# Patient Record
Sex: MALE | Race: WHITE | NOT HISPANIC OR LATINO | Employment: FULL TIME | URBAN - METROPOLITAN AREA
[De-identification: names, ages, dates, MRNs, and addresses within clinical notes are randomized per-mention and may not be internally consistent; named-entity substitution may affect disease eponyms.]

---

## 2018-01-08 ENCOUNTER — CONVERSION ENCOUNTER (OUTPATIENT)
Dept: SURGERY | Facility: CLINIC | Age: 48
End: 2018-01-08

## 2018-01-08 ENCOUNTER — OFFICE VISIT CONVERTED (OUTPATIENT)
Dept: SURGERY | Facility: CLINIC | Age: 48
End: 2018-01-08
Attending: SURGERY

## 2018-08-02 ENCOUNTER — OFFICE VISIT CONVERTED (OUTPATIENT)
Dept: ORTHOPEDIC SURGERY | Facility: CLINIC | Age: 48
End: 2018-08-02
Attending: ORTHOPAEDIC SURGERY

## 2018-08-20 ENCOUNTER — OFFICE VISIT CONVERTED (OUTPATIENT)
Dept: ORTHOPEDIC SURGERY | Facility: CLINIC | Age: 48
End: 2018-08-20
Attending: PHYSICIAN ASSISTANT

## 2018-09-10 ENCOUNTER — OFFICE VISIT CONVERTED (OUTPATIENT)
Dept: ORTHOPEDIC SURGERY | Facility: CLINIC | Age: 48
End: 2018-09-10
Attending: PHYSICIAN ASSISTANT

## 2018-10-11 ENCOUNTER — OFFICE VISIT CONVERTED (OUTPATIENT)
Dept: ORTHOPEDIC SURGERY | Facility: CLINIC | Age: 48
End: 2018-10-11
Attending: ORTHOPAEDIC SURGERY

## 2018-11-06 ENCOUNTER — OFFICE VISIT CONVERTED (OUTPATIENT)
Dept: ORTHOPEDIC SURGERY | Facility: CLINIC | Age: 48
End: 2018-11-06
Attending: PHYSICIAN ASSISTANT

## 2018-12-06 ENCOUNTER — OFFICE VISIT CONVERTED (OUTPATIENT)
Dept: ORTHOPEDIC SURGERY | Facility: CLINIC | Age: 48
End: 2018-12-06
Attending: ORTHOPAEDIC SURGERY

## 2019-01-09 ENCOUNTER — CONVERSION ENCOUNTER (OUTPATIENT)
Dept: ORTHOPEDIC SURGERY | Facility: CLINIC | Age: 49
End: 2019-01-09

## 2019-01-09 ENCOUNTER — OFFICE VISIT CONVERTED (OUTPATIENT)
Dept: ORTHOPEDIC SURGERY | Facility: CLINIC | Age: 49
End: 2019-01-09
Attending: PHYSICIAN ASSISTANT

## 2019-01-14 ENCOUNTER — HOSPITAL ENCOUNTER (OUTPATIENT)
Dept: PHYSICAL THERAPY | Facility: CLINIC | Age: 49
Setting detail: RECURRING SERIES
Discharge: HOME | End: 2019-02-11

## 2019-02-12 ENCOUNTER — OFFICE VISIT CONVERTED (OUTPATIENT)
Dept: ORTHOPEDIC SURGERY | Facility: CLINIC | Age: 49
End: 2019-02-12
Attending: PHYSICIAN ASSISTANT

## 2019-04-05 ENCOUNTER — HOSPITAL ENCOUNTER (OUTPATIENT)
Dept: MRI IMAGING | Facility: HOSPITAL | Age: 49
Discharge: HOME | End: 2019-04-05
Attending: NURSE PRACTITIONER

## 2019-04-11 ENCOUNTER — HOSPITAL ENCOUNTER (OUTPATIENT)
Dept: SLEEP MEDICINE | Facility: HOSPITAL | Age: 49
Discharge: HOME | End: 2019-04-11
Attending: PSYCHIATRY & NEUROLOGY

## 2019-04-23 ENCOUNTER — OFFICE VISIT CONVERTED (OUTPATIENT)
Dept: NEUROSURGERY | Facility: CLINIC | Age: 49
End: 2019-04-23
Attending: PHYSICIAN ASSISTANT

## 2019-04-29 ENCOUNTER — HOSPITAL ENCOUNTER (OUTPATIENT)
Dept: MRI IMAGING | Facility: HOSPITAL | Age: 49
Discharge: HOME | End: 2019-04-29
Attending: PHYSICIAN ASSISTANT

## 2019-05-08 ENCOUNTER — HOSPITAL ENCOUNTER (OUTPATIENT)
Dept: SLEEP MEDICINE | Facility: HOSPITAL | Age: 49
Discharge: HOME | End: 2019-05-08
Attending: PSYCHIATRY & NEUROLOGY

## 2019-05-16 ENCOUNTER — HOSPITAL ENCOUNTER (OUTPATIENT)
Dept: SLEEP MEDICINE | Facility: HOSPITAL | Age: 49
Discharge: HOME | End: 2019-05-16
Attending: PSYCHIATRY & NEUROLOGY

## 2019-07-23 ENCOUNTER — HOSPITAL ENCOUNTER (OUTPATIENT)
Dept: SLEEP MEDICINE | Facility: HOSPITAL | Age: 49
Discharge: HOME | End: 2019-07-23
Attending: PSYCHIATRY & NEUROLOGY

## 2019-08-28 ENCOUNTER — HOSPITAL ENCOUNTER (OUTPATIENT)
Dept: SLEEP MEDICINE | Facility: HOSPITAL | Age: 49
Discharge: HOME | End: 2019-08-28
Attending: PSYCHIATRY & NEUROLOGY

## 2021-02-03 ENCOUNTER — HOSPITAL ENCOUNTER (OUTPATIENT)
Dept: URGENT CARE | Facility: CLINIC | Age: 51
Discharge: HOME | End: 2021-02-03
Attending: EMERGENCY MEDICINE

## 2021-03-11 ENCOUNTER — HOSPITAL ENCOUNTER (OUTPATIENT)
Dept: URGENT CARE | Facility: CLINIC | Age: 51
Discharge: HOME | End: 2021-03-11
Attending: PHYSICIAN ASSISTANT

## 2021-04-08 ENCOUNTER — HOSPITAL ENCOUNTER (OUTPATIENT)
Dept: URGENT CARE | Facility: CLINIC | Age: 51
Discharge: HOME | End: 2021-04-08
Attending: PHYSICIAN ASSISTANT

## 2021-05-15 VITALS
SYSTOLIC BLOOD PRESSURE: 118 MMHG | BODY MASS INDEX: 30.02 KG/M2 | WEIGHT: 198.06 LBS | HEIGHT: 68 IN | DIASTOLIC BLOOD PRESSURE: 86 MMHG | HEART RATE: 68 BPM

## 2021-05-16 VITALS — HEIGHT: 68 IN | RESPIRATION RATE: 16 BRPM | BODY MASS INDEX: 30.31 KG/M2 | WEIGHT: 200 LBS

## 2021-05-16 VITALS — WEIGHT: 200 LBS | RESPIRATION RATE: 16 BRPM | HEIGHT: 68 IN | BODY MASS INDEX: 30.31 KG/M2

## 2021-05-16 VITALS — WEIGHT: 205 LBS | RESPIRATION RATE: 14 BRPM | BODY MASS INDEX: 31.07 KG/M2 | HEIGHT: 68 IN

## 2021-05-16 VITALS — WEIGHT: 213 LBS | BODY MASS INDEX: 32.28 KG/M2 | HEIGHT: 68 IN | RESPIRATION RATE: 16 BRPM

## 2021-05-16 VITALS — BODY MASS INDEX: 32.13 KG/M2 | WEIGHT: 212 LBS | RESPIRATION RATE: 16 BRPM | HEIGHT: 68 IN

## 2021-05-16 VITALS — HEIGHT: 68 IN | WEIGHT: 200 LBS | BODY MASS INDEX: 30.31 KG/M2 | RESPIRATION RATE: 16 BRPM

## 2021-05-16 VITALS — BODY MASS INDEX: 32.28 KG/M2 | WEIGHT: 213 LBS | RESPIRATION RATE: 12 BRPM | HEIGHT: 68 IN

## 2021-05-16 VITALS — WEIGHT: 200 LBS | BODY MASS INDEX: 30.31 KG/M2 | RESPIRATION RATE: 16 BRPM | HEIGHT: 68 IN

## 2021-05-16 VITALS — HEIGHT: 68 IN | BODY MASS INDEX: 30.31 KG/M2 | RESPIRATION RATE: 16 BRPM | WEIGHT: 200 LBS

## 2021-06-23 ENCOUNTER — TRANSCRIBE ORDERS (OUTPATIENT)
Dept: ADMINISTRATIVE | Facility: HOSPITAL | Age: 51
End: 2021-06-23

## 2021-06-23 DIAGNOSIS — R42 DIZZINESS: Primary | ICD-10-CM

## 2021-06-23 DIAGNOSIS — R06.00 DYSPNEA, UNSPECIFIED TYPE: ICD-10-CM

## 2021-09-07 ENCOUNTER — TRANSCRIBE ORDERS (OUTPATIENT)
Dept: ADMINISTRATIVE | Facility: HOSPITAL | Age: 51
End: 2021-09-07

## 2021-09-07 ENCOUNTER — HOSPITAL ENCOUNTER (OUTPATIENT)
Dept: INFUSION THERAPY | Facility: HOSPITAL | Age: 51
Discharge: HOME | End: 2021-09-07
Admitting: NURSE PRACTITIONER

## 2021-09-07 ENCOUNTER — HOSPITAL ENCOUNTER (EMERGENCY)
Facility: HOSPITAL | Age: 51
Discharge: HOME | End: 2021-09-07
Attending: EMERGENCY MEDICINE | Admitting: EMERGENCY MEDICINE

## 2021-09-07 ENCOUNTER — APPOINTMENT (OUTPATIENT)
Dept: CT IMAGING | Facility: HOSPITAL | Age: 51
End: 2021-09-07

## 2021-09-07 ENCOUNTER — APPOINTMENT (OUTPATIENT)
Dept: GENERAL RADIOLOGY | Facility: HOSPITAL | Age: 51
End: 2021-09-07

## 2021-09-07 VITALS
SYSTOLIC BLOOD PRESSURE: 111 MMHG | HEART RATE: 88 BPM | DIASTOLIC BLOOD PRESSURE: 41 MMHG | OXYGEN SATURATION: 90 % | TEMPERATURE: 102.6 F

## 2021-09-07 VITALS
RESPIRATION RATE: 20 BRPM | BODY MASS INDEX: 30.31 KG/M2 | WEIGHT: 199.96 LBS | OXYGEN SATURATION: 92 % | DIASTOLIC BLOOD PRESSURE: 75 MMHG | HEART RATE: 86 BPM | TEMPERATURE: 99 F | HEIGHT: 68 IN | SYSTOLIC BLOOD PRESSURE: 133 MMHG

## 2021-09-07 DIAGNOSIS — J12.82 PNEUMONIA DUE TO COVID-19 VIRUS: ICD-10-CM

## 2021-09-07 DIAGNOSIS — U07.1 CLINICAL DIAGNOSIS OF COVID-19: Primary | ICD-10-CM

## 2021-09-07 DIAGNOSIS — U07.1 COVID-19: Primary | ICD-10-CM

## 2021-09-07 DIAGNOSIS — U07.1 CLINICAL DIAGNOSIS OF SEVERE ACUTE RESPIRATORY SYNDROME CORONAVIRUS 2 (SARS-COV-2) DISEASE: Primary | ICD-10-CM

## 2021-09-07 DIAGNOSIS — U07.1 PNEUMONIA DUE TO COVID-19 VIRUS: ICD-10-CM

## 2021-09-07 LAB
ALBUMIN SERPL-MCNC: 4.3 G/DL (ref 3.5–5.2)
ALBUMIN/GLOB SERPL: 1.3 G/DL
ALP SERPL-CCNC: 65 U/L (ref 39–117)
ALT SERPL W P-5'-P-CCNC: 51 U/L (ref 1–41)
ANION GAP SERPL CALCULATED.3IONS-SCNC: 11.3 MMOL/L (ref 5–15)
AST SERPL-CCNC: 58 U/L (ref 1–40)
BASOPHILS # BLD AUTO: 0.01 10*3/MM3 (ref 0–0.2)
BASOPHILS NFR BLD AUTO: 0.3 % (ref 0–1.5)
BILIRUB SERPL-MCNC: 0.3 MG/DL (ref 0–1.2)
BUN SERPL-MCNC: 16 MG/DL (ref 6–20)
BUN/CREAT SERPL: 15.5 (ref 7–25)
CALCIUM SPEC-SCNC: 9 MG/DL (ref 8.6–10.5)
CHLORIDE SERPL-SCNC: 102 MMOL/L (ref 98–107)
CO2 SERPL-SCNC: 24.7 MMOL/L (ref 22–29)
CREAT SERPL-MCNC: 1.03 MG/DL (ref 0.76–1.27)
D DIMER PPP FEU-MCNC: 0.71 MG/L (FEU) (ref 0–0.59)
DEPRECATED RDW RBC AUTO: 43.2 FL (ref 37–54)
EOSINOPHIL # BLD AUTO: 0 10*3/MM3 (ref 0–0.4)
EOSINOPHIL NFR BLD AUTO: 0 % (ref 0.3–6.2)
ERYTHROCYTE [DISTWIDTH] IN BLOOD BY AUTOMATED COUNT: 12.7 % (ref 12.3–15.4)
GFR SERPL CREATININE-BSD FRML MDRD: 76 ML/MIN/1.73
GLOBULIN UR ELPH-MCNC: 3.3 GM/DL
GLUCOSE SERPL-MCNC: 102 MG/DL (ref 65–99)
HCT VFR BLD AUTO: 43.8 % (ref 37.5–51)
HGB BLD-MCNC: 14.8 G/DL (ref 13–17.7)
HOLD SPECIMEN: NORMAL
HOLD SPECIMEN: NORMAL
IMM GRANULOCYTES # BLD AUTO: 0.02 10*3/MM3 (ref 0–0.05)
IMM GRANULOCYTES NFR BLD AUTO: 0.6 % (ref 0–0.5)
LYMPHOCYTES # BLD AUTO: 1.45 10*3/MM3 (ref 0.7–3.1)
LYMPHOCYTES NFR BLD AUTO: 44.8 % (ref 19.6–45.3)
MCH RBC QN AUTO: 30.9 PG (ref 26.6–33)
MCHC RBC AUTO-ENTMCNC: 33.8 G/DL (ref 31.5–35.7)
MCV RBC AUTO: 91.4 FL (ref 79–97)
MONOCYTES # BLD AUTO: 0.12 10*3/MM3 (ref 0.1–0.9)
MONOCYTES NFR BLD AUTO: 3.7 % (ref 5–12)
NEUTROPHILS NFR BLD AUTO: 1.64 10*3/MM3 (ref 1.7–7)
NEUTROPHILS NFR BLD AUTO: 50.6 % (ref 42.7–76)
NRBC BLD AUTO-RTO: 0 /100 WBC (ref 0–0.2)
NT-PROBNP SERPL-MCNC: 183.3 PG/ML (ref 0–900)
PLATELET # BLD AUTO: 162 10*3/MM3 (ref 140–450)
PMV BLD AUTO: 9.3 FL (ref 6–12)
POTASSIUM SERPL-SCNC: 4.1 MMOL/L (ref 3.5–5.2)
PROT SERPL-MCNC: 7.6 G/DL (ref 6–8.5)
QT INTERVAL: 382 MS
RBC # BLD AUTO: 4.79 10*6/MM3 (ref 4.14–5.8)
SODIUM SERPL-SCNC: 138 MMOL/L (ref 136–145)
TROPONIN T SERPL-MCNC: <0.01 NG/ML (ref 0–0.03)
WBC # BLD AUTO: 3.24 10*3/MM3 (ref 3.4–10.8)
WHOLE BLOOD HOLD SPECIMEN: NORMAL
WHOLE BLOOD HOLD SPECIMEN: NORMAL

## 2021-09-07 PROCEDURE — 93005 ELECTROCARDIOGRAM TRACING: CPT | Performed by: EMERGENCY MEDICINE

## 2021-09-07 PROCEDURE — 85379 FIBRIN DEGRADATION QUANT: CPT | Performed by: NURSE PRACTITIONER

## 2021-09-07 PROCEDURE — 84484 ASSAY OF TROPONIN QUANT: CPT | Performed by: EMERGENCY MEDICINE

## 2021-09-07 PROCEDURE — 0 IOPAMIDOL PER 1 ML: Performed by: EMERGENCY MEDICINE

## 2021-09-07 PROCEDURE — 80053 COMPREHEN METABOLIC PANEL: CPT | Performed by: EMERGENCY MEDICINE

## 2021-09-07 PROCEDURE — 93010 ELECTROCARDIOGRAM REPORT: CPT | Performed by: INTERNAL MEDICINE

## 2021-09-07 PROCEDURE — M0243 CASIRIVI AND IMDEVI INFUSION: HCPCS | Performed by: NURSE PRACTITIONER

## 2021-09-07 PROCEDURE — 71275 CT ANGIOGRAPHY CHEST: CPT

## 2021-09-07 PROCEDURE — 83880 ASSAY OF NATRIURETIC PEPTIDE: CPT | Performed by: EMERGENCY MEDICINE

## 2021-09-07 PROCEDURE — 71045 X-RAY EXAM CHEST 1 VIEW: CPT

## 2021-09-07 PROCEDURE — 99284 EMERGENCY DEPT VISIT MOD MDM: CPT

## 2021-09-07 PROCEDURE — 85025 COMPLETE CBC W/AUTO DIFF WBC: CPT | Performed by: EMERGENCY MEDICINE

## 2021-09-07 PROCEDURE — 25010000006 INJECTION, CASIRIVIMAB AND IMDEVIMAB, 1200 MG: Performed by: NURSE PRACTITIONER

## 2021-09-07 RX ORDER — DIPHENHYDRAMINE HYDROCHLORIDE 50 MG/ML
50 INJECTION INTRAMUSCULAR; INTRAVENOUS AS NEEDED
Status: DISCONTINUED | OUTPATIENT
Start: 2021-09-07 | End: 2021-09-09 | Stop reason: HOSPADM

## 2021-09-07 RX ORDER — EPINEPHRINE 1 MG/ML
0.3 INJECTION, SOLUTION, CONCENTRATE INTRAVENOUS AS NEEDED
Status: DISCONTINUED | OUTPATIENT
Start: 2021-09-07 | End: 2021-09-09 | Stop reason: HOSPADM

## 2021-09-07 RX ORDER — SODIUM CHLORIDE 0.9 % (FLUSH) 0.9 %
10 SYRINGE (ML) INJECTION AS NEEDED
Status: DISCONTINUED | OUTPATIENT
Start: 2021-09-07 | End: 2021-09-07 | Stop reason: HOSPADM

## 2021-09-07 RX ORDER — AZITHROMYCIN 250 MG/1
TABLET, FILM COATED ORAL
Qty: 6 TABLET | Refills: 0 | Status: SHIPPED | OUTPATIENT
Start: 2021-09-07 | End: 2021-09-12 | Stop reason: HOSPADM

## 2021-09-07 RX ORDER — ACETAMINOPHEN 500 MG
500 TABLET ORAL ONCE AS NEEDED
Status: COMPLETED | OUTPATIENT
Start: 2021-09-07 | End: 2021-09-07

## 2021-09-07 RX ORDER — ESOMEPRAZOLE MAGNESIUM 10 MG/1
10 GRANULE, FOR SUSPENSION, EXTENDED RELEASE ORAL DAILY
COMMUNITY
End: 2022-02-08

## 2021-09-07 RX ORDER — BROMPHENIRAMINE MALEATE, PSEUDOEPHEDRINE HYDROCHLORIDE, AND DEXTROMETHORPHAN HYDROBROMIDE 2; 30; 10 MG/5ML; MG/5ML; MG/5ML
5 SYRUP ORAL
COMMUNITY
Start: 2021-08-31 | End: 2022-02-08

## 2021-09-07 RX ORDER — PREDNISONE 20 MG/1
60 TABLET ORAL DAILY
Qty: 15 TABLET | Refills: 0 | Status: SHIPPED | OUTPATIENT
Start: 2021-09-07 | End: 2021-09-12 | Stop reason: HOSPADM

## 2021-09-07 RX ADMIN — IOPAMIDOL 100 ML: 755 INJECTION, SOLUTION INTRAVENOUS at 09:34

## 2021-09-07 RX ADMIN — ACETAMINOPHEN 500 MG: 500 TABLET, FILM COATED ORAL at 14:45

## 2021-09-07 RX ADMIN — CASIRIVIMAB AND IMDEVIMAB 300 MG: 600; 600 INJECTION, SOLUTION, CONCENTRATE INTRAVENOUS at 14:14

## 2021-09-07 NOTE — DISCHARGE INSTRUCTIONS
Please return at 2 PM Eastern time to receive your antibody infusion, you will park in the blue parking lot, and call [phone] let them know you had arrived.  Return to the emergency department for new or worsening symptoms  Rest  Please follow-up with your primary care provider, call for an appointment

## 2021-09-07 NOTE — ED PROVIDER NOTES
Subjective    Chief Complaint   Patient presents with   • Exposure To Known Illness   • Shortness of Breath     Rocio Fernandez, APRN  No LMP for male patient.  No Known Allergies    History of Present Illness  Patient is a 50-year-old male presents emergency department complaint of shortness of breath, dizziness.  Patient reports he was is coming to visit his wife, who is here for similar symptoms, and became more short of breath, with some associated chest discomfort.  Patient reports he did test positive for COVID-19 on 8/31/2021 when at his primary care provider's office.  He did not receive a Regeneron infusion.    Review of Systems   Constitutional: Positive for chills and fever.   Respiratory: Positive for cough and shortness of breath. Negative for chest tightness.    Cardiovascular: Negative for chest pain, palpitations and leg swelling.   Gastrointestinal: Negative for abdominal pain, diarrhea, nausea and vomiting.   Musculoskeletal: Negative for back pain and neck pain.   Skin: Negative for color change and rash.   Neurological: Positive for dizziness and light-headedness. Negative for seizures, syncope, speech difficulty, weakness and numbness.       Past Medical History:   Diagnosis Date   • Kidney stone        No Known Allergies    Past Surgical History:   Procedure Laterality Date   • ELBOW ARTHROPLASTY      right   • ROTATOR CUFF REPAIR      right       History reviewed. No pertinent family history.    Social History     Socioeconomic History   • Marital status:      Spouse name: Not on file   • Number of children: Not on file   • Years of education: Not on file   • Highest education level: Not on file   Vaping Use   • Vaping Use: Never used   Substance and Sexual Activity   • Alcohol use: Not Currently   • Drug use: Never           Objective   Physical Exam  Vitals and nursing note reviewed.   Constitutional:       General: He is not in acute distress.     Appearance: He is well-developed.  "He is not ill-appearing, toxic-appearing or diaphoretic.   HENT:      Head: Normocephalic and atraumatic.      Mouth/Throat:      Mouth: Mucous membranes are moist.      Pharynx: Oropharynx is clear.   Eyes:      Extraocular Movements: Extraocular movements intact.      Pupils: Pupils are equal, round, and reactive to light.   Cardiovascular:      Rate and Rhythm: Normal rate and regular rhythm.      Heart sounds: No murmur heard.   No friction rub. No gallop.    Pulmonary:      Effort: Pulmonary effort is normal.      Breath sounds: Normal breath sounds.   Musculoskeletal:      Cervical back: Normal range of motion and neck supple.      Right lower leg: No tenderness. No edema.      Left lower leg: No tenderness. No edema.   Skin:     General: Skin is warm and dry.      Capillary Refill: Capillary refill takes less than 2 seconds.   Neurological:      General: No focal deficit present.      Mental Status: He is alert and oriented to person, place, and time.   Psychiatric:         Mood and Affect: Mood normal.         Behavior: Behavior normal.         Procedures           ED Course  ED Course as of Sep 07 1513   Tue Sep 07, 2021   1103 Patient reading [employer] patient information sheet.       [LB]   1124 Patient wishes to proceed with regeneron infusion.       [LB]   1139 Spoke with Gely Rodrigues regarding regeneron infusion.     [LB]   1155 Lilia pharmacist, arranged regeneron infusion for 2pm, orders placed per her.     [LB]      ED Course User Index  [LB] Ravi Yadira M, APRN      /75   Pulse 86   Temp 99 °F (37.2 °C) (Oral)   Resp 20   Ht 172.7 cm (68\")   Wt 90.7 kg (199 lb 15.3 oz)   SpO2 92%   BMI 30.40 kg/m²   Labs Reviewed   COMPREHENSIVE METABOLIC PANEL - Abnormal; Notable for the following components:       Result Value    Glucose 102 (*)     ALT (SGPT) 51 (*)     AST (SGOT) 58 (*)     All other components within normal limits    Narrative:     GFR Normal >60  Chronic Kidney Disease " <60  Kidney Failure <15     CBC WITH AUTO DIFFERENTIAL - Abnormal; Notable for the following components:    WBC 3.24 (*)     Monocyte % 3.7 (*)     Eosinophil % 0.0 (*)     Immature Grans % 0.6 (*)     Neutrophils, Absolute 1.64 (*)     All other components within normal limits   D-DIMER, QUANTITATIVE - Abnormal; Notable for the following components:    D-Dimer, Quantitative 0.71 (*)     All other components within normal limits    Narrative:     Effective 6/30/09 new normal reference range: <= 0.59 mg/L FEU  Increases in D-dimer concentration observed with  thromboembolic events can be variable due to localization,  size and age of the thrombus. Therefore, a thromboembolic  event cannot be diagnosed with certainty on the basis of the  reference range.  D-dimers may also be elevated for a variety of disorders   including: advanced age, pregnancy, coronary disease, cancer,  liver disease, infection, inflammation, hematoma, DIC, trauma,  post surgery, diabetes, thrombolytic therapy, stress, and  general hospitalization. D-dimer levels may be decreased in  patients on anticoagulant therapy.   BNP (IN-HOUSE) - Normal    Narrative:     Among patients with dyspnea, NT-proBNP is highly sensitive for the detection of acute congestive heart failure. In addition NT-proBNP of <300 pg/ml effectively rules out acute congestive heart failure with 99% negative predictive value.    Results may be falsely decreased if patient taking Biotin.     TROPONIN (IN-HOUSE) - Normal    Narrative:     Troponin T Reference Range:  <= 0.03 ng/mL-   Negative for AMI  >0.03 ng/mL-     Abnormal for myocardial necrosis.  Clinicians would have to utilize clinical acumen, EKG, Troponin and serial changes to determine if it is an Acute Myocardial Infarction or myocardial injury due to an underlying chronic condition.       Results may be falsely decreased if patient taking Biotin.     RAINBOW DRAW    Narrative:     The following orders were created for  panel order [place] Draw.  Procedure                               Abnormality         Status                     ---------                               -----------         ------                     Green Top (Gel)[[id number]]                                  Final result               Lavender Top[[id number]]                                     Final result               Gold Top - SST[[id number]]                                   Final result               Light Blue Top[[id number]]                                   Final result                 Please view results for these tests on the individual orders.   CBC AND DIFFERENTIAL    Narrative:     The following orders were created for panel order CBC & Differential.  Procedure                               Abnormality         Status                     ---------                               -----------         ------                     CBC Auto Differential[[id number]]        Abnormal            Final result                 Please view results for these tests on the individual orders.   GREEN TOP   LAVENDER TOP   GOLD TOP - SST   LIGHT BLUE TOP     Medications   iopamidol (ISOVUE-370) 76 % injection 100 mL (100 mL Intravenous Given 9/7/21 0934)     XR Chest 1 View    Result Date: 9/7/2021  Narrative: PROCEDURE: XR CHEST 1 VW  COMPARISON: None  INDICATIONS: cough, shortness of breath, covid  FINDINGS:  The heart is normal in size.  The lungs are well-expanded.  Diffuse patchy airspace disease in the lungs bilaterally is consistent with multifocal pneumonia.  The differential diagnosis includes COVID-19 pneumonia.  Bony structures appear intact.  CONCLUSION:  Bilateral airspace disease consistent with multifocal pneumonia.       KYM MANJARREZ MD       Electronically Signed and Approved By: KYM MANJARREZ MD on 9/07/2021 at 8:08             CT Chest Pulmonary Embolism    Result Date: 9/7/2021  Narrative: PROCEDURE: CT CHEST PULMONARY EMBOLISM W CONTRAST  COMPARISON:   [facility], CT, ABD PEL W/O CONTRAST, 8/16/2014, 13:34. INDICATIONS: PE suspected, low/intermediate prob, positive D-dimer/covid + 1 WEEK AGO  TECHNIQUE: After obtaining the patient's consent, CT images were obtained with non-ionic intravenous contrast material.   PROTOCOL:   Pulmonary embolism imaging protocol performed    RADIATION:   DLP: 371.3mGy*cm   Automated exposure control was utilized to minimize radiation dose. CONTRAST: 100cc Isovue 370 I.V.  FINDINGS:  No evidence for pulmonary embolus.  No evidence for acute aortic injury.  No adenopathy.  No acute findings in the included upper abdomen.  Small hiatal hernia.  Trace pleural effusions.  Patchy bilateral ground-glass opacity, predominately peripheral.  No aggressive appearing bone change.  CONCLUSION: No evidence for pulmonary embolus.  Bilateral pulmonary opacities, consistent with provided history of COVID-19.     JEFF HERNDON MD       Electronically Signed and Approved By: JEFF HERNDON MD on 9/07/2021 at 9:56                  The FDA has authorized the emergency use of casirivimab and imdevimab, which is not an FDA approved drug. Discussions with the patient regarding the risks and benefits of casirivimab and imdevimab have occurred. The patient recognizes that this is an investigational treatment which may offer significant known and potential benefits and risks, the extent of which are unknown. Information on available alternative treatments and the risks and benefits of those alternatives was discussed. The patient received the “Fact Sheet for Patients Parents and Caregivers”. All questions from the patient were answered to satisfaction. The patient has the option to accept or refuse treatment with casirivimab and imdevimab and would like to accept treatment.    Counseling regarding continued self isolation and use of infection control measures according to the CDC guidelines has occurred.                                MDM  Number of  Diagnoses or Management Options     Amount and/or Complexity of Data Reviewed  Clinical lab tests: reviewed  Tests in the radiology section of CPT®: reviewed  Tests in the medicine section of CPT®: reviewed  Review and summarize past medical records: yes (Reviewed patient's care everywhere, positive COVID-19 test on 8/31/2021.)    Appropriate PPE was worn during the duration of the care for this patient while in the emergency department per [facility] Policy    ----Differentials: Pneumonia, PE, CHF  This list is not all inclusive and does not constitute the entireity of considered causes.     ----Lab and imaging reviewd by me, imaging interpreted per radiologist and independly viewed by me:     As above    ED  Course----Patient was brought back to the emergency department room for evaluation and placed on appropriate monitoring.      Record Review: Patient had positive COVID-19 test with his primary care provider.    Vital signs have  been reviewed. Patient is afebrile. Non toxic in appearance. Alert and oriented to person, place, time and situation.  Patient has no PE on his CT protocol.  Patient maintains O2 saturations at least 92 to 93, is not requiring oxygen.  Patient is a candidate for Regeneron.  He was agreeable.  This was scheduled via protocol for this facility, schedule by the pharmacist.    I spoke with the patient at the bedside regarding their plan of care, discharge instruction, home care, prescriptions, and importance follow-up.  We discussed test results at the bedside, including incidental abnormal labs, radiological findings, understands need for follow-up with primary care or specialist if indicated.      Patient was made aware of indications to return to the emergency department.  Patient agrees with the current plan of care for discharge, verbalized understanding of all instructions    Pt is aware that discharge does not mean that nothing is wrong but it indicates no emergency is present  and they must continue care with follow-up as given below or physician of their choice                            Final diagnoses:   Clinical diagnosis of COVID-19   Pneumonia due to COVID-19 virus       ED Disposition  ED Disposition     ED Disposition Condition Comment    Discharge Stable           Rocio Fernandez, APRN  [address]  [facility]  [address]  [facility] [address]  [phone]    Schedule an appointment as soon as possible for a visit       [facility] EMERGENCY ROOM  [address] [place] [id number] [phone]    As needed, If symptoms worsen         Medication List      New Prescriptions    azithromycin 250 MG tablet  Commonly known as: Zithromax Z-Ronny  Take 2 tablets by mouth on day 1, then 1 tablet daily on days 2-5     predniSONE 20 MG tablet  Commonly known as: DELTASONE  Take 3 tablets by mouth Daily for 5 days.           Where to Get Your Medications      These medications were [facility] - [address] [facility] [address]    Hours: Mon-Fri 9:00AM-7:30PM Saturday 9:00AM-2:00PM Phone: [phone]   · azithromycin 250 MG tablet  · predniSONE 20 MG tablet          Yadira Rodrigues, APRN  09/07/21 1398

## 2021-09-08 ENCOUNTER — HOSPITAL ENCOUNTER (INPATIENT)
Facility: HOSPITAL | Age: 51
LOS: 4 days | Discharge: HOME | End: 2021-09-12
Attending: EMERGENCY MEDICINE | Admitting: HOSPITALIST

## 2021-09-08 ENCOUNTER — APPOINTMENT (OUTPATIENT)
Dept: GENERAL RADIOLOGY | Facility: HOSPITAL | Age: 51
End: 2021-09-08

## 2021-09-08 DIAGNOSIS — R06.09 DYSPNEA ON EXERTION: Primary | ICD-10-CM

## 2021-09-08 DIAGNOSIS — U07.1 COVID-19: ICD-10-CM

## 2021-09-08 LAB
ALBUMIN SERPL-MCNC: 3.9 G/DL (ref 3.5–5.2)
ALBUMIN/GLOB SERPL: 1.1 G/DL
ALP SERPL-CCNC: 55 U/L (ref 39–117)
ALT SERPL W P-5'-P-CCNC: 48 U/L (ref 1–41)
ANION GAP SERPL CALCULATED.3IONS-SCNC: 11.9 MMOL/L (ref 5–15)
AST SERPL-CCNC: 58 U/L (ref 1–40)
BASOPHILS # BLD AUTO: 0 10*3/MM3 (ref 0–0.2)
BASOPHILS NFR BLD AUTO: 0 % (ref 0–1.5)
BILIRUB SERPL-MCNC: 0.3 MG/DL (ref 0–1.2)
BUN SERPL-MCNC: 17 MG/DL (ref 6–20)
BUN/CREAT SERPL: 14.3 (ref 7–25)
CALCIUM SPEC-SCNC: 9 MG/DL (ref 8.6–10.5)
CHLORIDE SERPL-SCNC: 100 MMOL/L (ref 98–107)
CO2 SERPL-SCNC: 22.1 MMOL/L (ref 22–29)
CREAT SERPL-MCNC: 1.19 MG/DL (ref 0.76–1.27)
DEPRECATED RDW RBC AUTO: 42.5 FL (ref 37–54)
EOSINOPHIL # BLD AUTO: 0 10*3/MM3 (ref 0–0.4)
EOSINOPHIL NFR BLD AUTO: 0 % (ref 0.3–6.2)
ERYTHROCYTE [DISTWIDTH] IN BLOOD BY AUTOMATED COUNT: 12.7 % (ref 12.3–15.4)
GFR SERPL CREATININE-BSD FRML MDRD: 65 ML/MIN/1.73
GLOBULIN UR ELPH-MCNC: 3.4 GM/DL
GLUCOSE SERPL-MCNC: 182 MG/DL (ref 65–99)
HCT VFR BLD AUTO: 40.6 % (ref 37.5–51)
HGB BLD-MCNC: 14.1 G/DL (ref 13–17.7)
HOLD SPECIMEN: NORMAL
HOLD SPECIMEN: NORMAL
IMM GRANULOCYTES # BLD AUTO: 0.02 10*3/MM3 (ref 0–0.05)
IMM GRANULOCYTES NFR BLD AUTO: 0.7 % (ref 0–0.5)
LYMPHOCYTES # BLD AUTO: 0.59 10*3/MM3 (ref 0.7–3.1)
LYMPHOCYTES NFR BLD AUTO: 19.4 % (ref 19.6–45.3)
MCH RBC QN AUTO: 31.3 PG (ref 26.6–33)
MCHC RBC AUTO-ENTMCNC: 34.7 G/DL (ref 31.5–35.7)
MCV RBC AUTO: 90 FL (ref 79–97)
MONOCYTES # BLD AUTO: 0.09 10*3/MM3 (ref 0.1–0.9)
MONOCYTES NFR BLD AUTO: 3 % (ref 5–12)
NEUTROPHILS NFR BLD AUTO: 2.34 10*3/MM3 (ref 1.7–7)
NEUTROPHILS NFR BLD AUTO: 76.9 % (ref 42.7–76)
NRBC BLD AUTO-RTO: 0 /100 WBC (ref 0–0.2)
NT-PROBNP SERPL-MCNC: 128 PG/ML (ref 0–900)
PLATELET # BLD AUTO: 199 10*3/MM3 (ref 140–450)
PMV BLD AUTO: 9.7 FL (ref 6–12)
POTASSIUM SERPL-SCNC: 3.9 MMOL/L (ref 3.5–5.2)
PROT SERPL-MCNC: 7.3 G/DL (ref 6–8.5)
RBC # BLD AUTO: 4.51 10*6/MM3 (ref 4.14–5.8)
SODIUM SERPL-SCNC: 134 MMOL/L (ref 136–145)
TROPONIN T SERPL-MCNC: <0.01 NG/ML (ref 0–0.03)
WBC # BLD AUTO: 3.04 10*3/MM3 (ref 3.4–10.8)
WHOLE BLOOD HOLD SPECIMEN: NORMAL
WHOLE BLOOD HOLD SPECIMEN: NORMAL

## 2021-09-08 PROCEDURE — 99284 EMERGENCY DEPT VISIT MOD MDM: CPT

## 2021-09-08 PROCEDURE — 85025 COMPLETE CBC W/AUTO DIFF WBC: CPT

## 2021-09-08 PROCEDURE — 93005 ELECTROCARDIOGRAM TRACING: CPT | Performed by: EMERGENCY MEDICINE

## 2021-09-08 PROCEDURE — 71045 X-RAY EXAM CHEST 1 VIEW: CPT

## 2021-09-08 PROCEDURE — 93010 ELECTROCARDIOGRAM REPORT: CPT | Performed by: INTERNAL MEDICINE

## 2021-09-08 PROCEDURE — G0378 HOSPITAL OBSERVATION PER HR: HCPCS

## 2021-09-08 PROCEDURE — 93005 ELECTROCARDIOGRAM TRACING: CPT

## 2021-09-08 PROCEDURE — 83880 ASSAY OF NATRIURETIC PEPTIDE: CPT

## 2021-09-08 PROCEDURE — 99221 1ST HOSP IP/OBS SF/LOW 40: CPT | Performed by: PHYSICIAN ASSISTANT

## 2021-09-08 PROCEDURE — 84484 ASSAY OF TROPONIN QUANT: CPT

## 2021-09-08 PROCEDURE — 80053 COMPREHEN METABOLIC PANEL: CPT

## 2021-09-08 RX ORDER — ALBUTEROL SULFATE 2.5 MG/3ML
2.5 SOLUTION RESPIRATORY (INHALATION) ONCE AS NEEDED
Status: DISCONTINUED | OUTPATIENT
Start: 2021-09-08 | End: 2021-09-12 | Stop reason: HOSPADM

## 2021-09-08 RX ORDER — SODIUM CHLORIDE 0.9 % (FLUSH) 0.9 %
10 SYRINGE (ML) INJECTION AS NEEDED
Status: DISCONTINUED | OUTPATIENT
Start: 2021-09-08 | End: 2021-09-12 | Stop reason: HOSPADM

## 2021-09-08 RX ORDER — SODIUM CHLORIDE FOR INHALATION 3 %
4 VIAL, NEBULIZER (ML) INHALATION ONCE AS NEEDED
Status: DISCONTINUED | OUTPATIENT
Start: 2021-09-08 | End: 2021-09-12 | Stop reason: HOSPADM

## 2021-09-08 RX ORDER — IPRATROPIUM BROMIDE AND ALBUTEROL SULFATE 2.5; .5 MG/3ML; MG/3ML
3 SOLUTION RESPIRATORY (INHALATION) EVERY 4 HOURS PRN
Status: DISCONTINUED | OUTPATIENT
Start: 2021-09-08 | End: 2021-09-10

## 2021-09-08 RX ORDER — DEXAMETHASONE SODIUM PHOSPHATE 10 MG/ML
6 INJECTION INTRAMUSCULAR; INTRAVENOUS DAILY
Status: DISCONTINUED | OUTPATIENT
Start: 2021-09-09 | End: 2021-09-12 | Stop reason: HOSPADM

## 2021-09-09 LAB
ALBUMIN SERPL-MCNC: 3.6 G/DL (ref 3.5–5.2)
ALBUMIN/GLOB SERPL: 1.1 G/DL
ALP SERPL-CCNC: 55 U/L (ref 39–117)
ALT SERPL W P-5'-P-CCNC: 43 U/L (ref 1–41)
ANION GAP SERPL CALCULATED.3IONS-SCNC: 12.8 MMOL/L (ref 5–15)
AST SERPL-CCNC: 58 U/L (ref 1–40)
BASOPHILS # BLD AUTO: 0.01 10*3/MM3 (ref 0–0.2)
BASOPHILS NFR BLD AUTO: 0.1 % (ref 0–1.5)
BILIRUB SERPL-MCNC: 0.3 MG/DL (ref 0–1.2)
BUN SERPL-MCNC: 14 MG/DL (ref 6–20)
BUN/CREAT SERPL: 13.5 (ref 7–25)
CALCIUM SPEC-SCNC: 9.1 MG/DL (ref 8.6–10.5)
CHLORIDE SERPL-SCNC: 102 MMOL/L (ref 98–107)
CK SERPL-CCNC: 175 U/L (ref 20–200)
CO2 SERPL-SCNC: 22.2 MMOL/L (ref 22–29)
CREAT SERPL-MCNC: 1.04 MG/DL (ref 0.76–1.27)
CRP SERPL-MCNC: 9.69 MG/DL (ref 0–0.5)
D DIMER PPP FEU-MCNC: 0.66 MG/L (FEU) (ref 0–0.59)
DEPRECATED RDW RBC AUTO: 42.3 FL (ref 37–54)
EOSINOPHIL # BLD AUTO: 0 10*3/MM3 (ref 0–0.4)
EOSINOPHIL NFR BLD AUTO: 0 % (ref 0.3–6.2)
ERYTHROCYTE [DISTWIDTH] IN BLOOD BY AUTOMATED COUNT: 12.8 % (ref 12.3–15.4)
FERRITIN SERPL-MCNC: 1257 NG/ML (ref 30–400)
FIBRINOGEN PPP-MCNC: 519 MG/DL (ref 200–450)
GFR SERPL CREATININE-BSD FRML MDRD: 76 ML/MIN/1.73
GLOBULIN UR ELPH-MCNC: 3.4 GM/DL
GLUCOSE BLDC GLUCOMTR-MCNC: 154 MG/DL (ref 70–99)
GLUCOSE SERPL-MCNC: 123 MG/DL (ref 65–99)
HCT VFR BLD AUTO: 38.4 % (ref 37.5–51)
HGB BLD-MCNC: 13.3 G/DL (ref 13–17.7)
IMM GRANULOCYTES # BLD AUTO: 0.03 10*3/MM3 (ref 0–0.05)
IMM GRANULOCYTES NFR BLD AUTO: 0.4 % (ref 0–0.5)
L PNEUMO1 AG UR QL IA: NEGATIVE
LDH SERPL-CCNC: 513 U/L (ref 135–225)
LYMPHOCYTES # BLD AUTO: 1.34 10*3/MM3 (ref 0.7–3.1)
LYMPHOCYTES NFR BLD AUTO: 16.9 % (ref 19.6–45.3)
MAGNESIUM SERPL-MCNC: 1.8 MG/DL (ref 1.6–2.6)
MCH RBC QN AUTO: 31.1 PG (ref 26.6–33)
MCHC RBC AUTO-ENTMCNC: 34.6 G/DL (ref 31.5–35.7)
MCV RBC AUTO: 89.7 FL (ref 79–97)
MONOCYTES # BLD AUTO: 0.5 10*3/MM3 (ref 0.1–0.9)
MONOCYTES NFR BLD AUTO: 6.3 % (ref 5–12)
NEUTROPHILS NFR BLD AUTO: 6.04 10*3/MM3 (ref 1.7–7)
NEUTROPHILS NFR BLD AUTO: 76.3 % (ref 42.7–76)
NRBC BLD AUTO-RTO: 0 /100 WBC (ref 0–0.2)
PLATELET # BLD AUTO: 219 10*3/MM3 (ref 140–450)
PMV BLD AUTO: 10 FL (ref 6–12)
POTASSIUM SERPL-SCNC: 3.8 MMOL/L (ref 3.5–5.2)
PROCALCITONIN SERPL-MCNC: 0.12 NG/ML (ref 0–0.25)
PROT SERPL-MCNC: 7 G/DL (ref 6–8.5)
QT INTERVAL: 407 MS
RBC # BLD AUTO: 4.28 10*6/MM3 (ref 4.14–5.8)
S PNEUM AG SPEC QL LA: NEGATIVE
SODIUM SERPL-SCNC: 137 MMOL/L (ref 136–145)
WBC # BLD AUTO: 7.92 10*3/MM3 (ref 3.4–10.8)

## 2021-09-09 PROCEDURE — 86140 C-REACTIVE PROTEIN: CPT | Performed by: PHYSICIAN ASSISTANT

## 2021-09-09 PROCEDURE — 87899 AGENT NOS ASSAY W/OPTIC: CPT | Performed by: PHYSICIAN ASSISTANT

## 2021-09-09 PROCEDURE — 83615 LACTATE (LD) (LDH) ENZYME: CPT | Performed by: PHYSICIAN ASSISTANT

## 2021-09-09 PROCEDURE — 82550 ASSAY OF CK (CPK): CPT | Performed by: PHYSICIAN ASSISTANT

## 2021-09-09 PROCEDURE — G0378 HOSPITAL OBSERVATION PER HR: HCPCS

## 2021-09-09 PROCEDURE — 85025 COMPLETE CBC W/AUTO DIFF WBC: CPT | Performed by: PHYSICIAN ASSISTANT

## 2021-09-09 PROCEDURE — 82962 GLUCOSE BLOOD TEST: CPT

## 2021-09-09 PROCEDURE — 83735 ASSAY OF MAGNESIUM: CPT | Performed by: PHYSICIAN ASSISTANT

## 2021-09-09 PROCEDURE — 85384 FIBRINOGEN ACTIVITY: CPT | Performed by: PHYSICIAN ASSISTANT

## 2021-09-09 PROCEDURE — 94660 CPAP INITIATION&MGMT: CPT

## 2021-09-09 PROCEDURE — 80053 COMPREHEN METABOLIC PANEL: CPT | Performed by: PHYSICIAN ASSISTANT

## 2021-09-09 PROCEDURE — 85379 FIBRIN DEGRADATION QUANT: CPT | Performed by: PHYSICIAN ASSISTANT

## 2021-09-09 PROCEDURE — 94640 AIRWAY INHALATION TREATMENT: CPT

## 2021-09-09 PROCEDURE — 63710000001 DEXAMETHASONE PER 0.25 MG: Performed by: PHYSICIAN ASSISTANT

## 2021-09-09 PROCEDURE — 99233 SBSQ HOSP IP/OBS HIGH 50: CPT | Performed by: INTERNAL MEDICINE

## 2021-09-09 PROCEDURE — 84145 PROCALCITONIN (PCT): CPT | Performed by: PHYSICIAN ASSISTANT

## 2021-09-09 PROCEDURE — 25010000002 ENOXAPARIN PER 10 MG: Performed by: PHYSICIAN ASSISTANT

## 2021-09-09 PROCEDURE — 87040 BLOOD CULTURE FOR BACTERIA: CPT | Performed by: PHYSICIAN ASSISTANT

## 2021-09-09 PROCEDURE — 82728 ASSAY OF FERRITIN: CPT | Performed by: PHYSICIAN ASSISTANT

## 2021-09-09 PROCEDURE — 94799 UNLISTED PULMONARY SVC/PX: CPT

## 2021-09-09 RX ORDER — BUDESONIDE AND FORMOTEROL FUMARATE DIHYDRATE 80; 4.5 UG/1; UG/1
2 AEROSOL RESPIRATORY (INHALATION)
Status: DISCONTINUED | OUTPATIENT
Start: 2021-09-09 | End: 2021-09-09

## 2021-09-09 RX ORDER — BUDESONIDE AND FORMOTEROL FUMARATE DIHYDRATE 160; 4.5 UG/1; UG/1
2 AEROSOL RESPIRATORY (INHALATION)
Status: DISCONTINUED | OUTPATIENT
Start: 2021-09-09 | End: 2021-09-12 | Stop reason: HOSPADM

## 2021-09-09 RX ORDER — LOPERAMIDE HYDROCHLORIDE 2 MG/1
2 CAPSULE ORAL 4 TIMES DAILY PRN
Status: DISCONTINUED | OUTPATIENT
Start: 2021-09-09 | End: 2021-09-12 | Stop reason: HOSPADM

## 2021-09-09 RX ORDER — ACETAMINOPHEN 325 MG/1
650 TABLET ORAL EVERY 4 HOURS PRN
Status: DISCONTINUED | OUTPATIENT
Start: 2021-09-09 | End: 2021-09-12 | Stop reason: HOSPADM

## 2021-09-09 RX ORDER — CHOLECALCIFEROL (VITAMIN D3) 125 MCG
5 CAPSULE ORAL NIGHTLY PRN
Status: DISCONTINUED | OUTPATIENT
Start: 2021-09-09 | End: 2021-09-12 | Stop reason: HOSPADM

## 2021-09-09 RX ADMIN — ACETAMINOPHEN 650 MG: 325 TABLET ORAL at 09:30

## 2021-09-09 RX ADMIN — BUDESONIDE AND FORMOTEROL FUMARATE DIHYDRATE 2 PUFF: 160; 4.5 AEROSOL RESPIRATORY (INHALATION) at 21:14

## 2021-09-09 RX ADMIN — ACETAMINOPHEN 650 MG: 325 TABLET ORAL at 14:08

## 2021-09-09 RX ADMIN — ACETAMINOPHEN 650 MG: 325 TABLET ORAL at 02:51

## 2021-09-09 RX ADMIN — LOPERAMIDE HYDROCHLORIDE 2 MG: 2 CAPSULE ORAL at 15:39

## 2021-09-09 RX ADMIN — ENOXAPARIN SODIUM 40 MG: 40 INJECTION SUBCUTANEOUS at 09:31

## 2021-09-09 RX ADMIN — DEXAMETHASONE 6 MG: 0.5 TABLET ORAL at 09:29

## 2021-09-09 NOTE — H&P
" [facility]   HOSPITALIST HISTORY AND PHYSICAL  Date: 2021   Patient Name: Ahs Miller  : 1970  MRN: [id number]  Primary Care Physician:  Rocio Fernandez, GOVIND  Date of admission: 2021    Subjective   Subjective     Chief Complaint: Shortness of breath    HPI:    Ash Miller is a 50 y.o. male with no past medical history presented to emergency department due to shortness of breath.  He tested positive for COVID-19 8 days ago.  He is unvaccinated because he \"thought natural immunity was better\".  States that he thinks he got ill from his son.  His wife is also admitted to our facility.  States his symptoms worsened yesterday, he developed a cough and shortness of breath with exertion.  States that it is not possible for him to talk and walk at the same time due to shortness of air.  States he is also had diarrhea.  No nausea or abdominal pain.  He came to the emergency department yesterday and received monoclonal antibody infusion.  States that today he does not feel any better.    In the emergency department, patient became tachypneic in the 40s to 50s with exertion.  His oxygen saturation dropped to 89% on exertion.  He did not require oxygen at rest.  Laboratory evaluation was unremarkable and chest x-ray was actually improved from previous.  Emergency department nurse practitioner did not feel comfortable sending the patient home, so the hospitalist team was contacted to admit for further management.      Personal History     Past Medical History:  Past Medical History:   Diagnosis Date   • Kidney stone        Past Surgical History:  Past Surgical History:   Procedure Laterality Date   • ELBOW ARTHROPLASTY      right   • ROTATOR CUFF REPAIR      right       Family History:   History reviewed. No pertinent family history.    Social History:    reports that he has never smoked. He has never used smokeless tobacco. He reports previous alcohol use. He reports that he does not use " drugs.    Home Medications:  azithromycin, brompheniramine-pseudoephedrine-DM, esomeprazole, and predniSONE    Allergies:  No Known Allergies    Review of Systems   All systems were reviewed and negative except for: Those listed in the HPI    Objective   Objective     Vitals:   Temp:  [98.1 °F (36.7 °C)-98.2 °F (36.8 °C)] 98.2 °F (36.8 °C)  Heart Rate:  [65-67] 67  Resp:  [22-28] 28  BP: (107-120)/(56-68) 107/68    Physical Exam    Constitutional: Awake, alert, no acute distress   Eyes: Pupils equal, sclerae anicteric, no conjunctival injection   HENT: NCAT, mucous membranes moist   Neck: Supple, no thyromegaly, no lymphadenopathy, trachea midline   Respiratory: Clear to auscultation bilaterally, nonlabored respirations    Cardiovascular: RRR, no murmurs, rubs, or gallops, palpable pedal pulses bilaterally   Gastrointestinal: Positive bowel sounds, soft, nontender, nondistended   Musculoskeletal: No bilateral ankle edema, no clubbing or cyanosis to extremities   Psychiatric: Appropriate affect, cooperative   Neurologic: Oriented x 3, strength symmetric in all extremities, Cranial Nerves grossly intact to confrontation, speech clear   Skin: No rashes     Imaging:   XR Chest 1 View    Result Date: 9/8/2021  PROCEDURE: XR CHEST 1 VW  COMPARISON: [facility], , XR CHEST 1 VW, 9/07/2021, 7:36.  INDICATIONS: SHORTNESS OF BREATH, COUGH COVID +  FINDINGS: A single PA upright portable view of the chest is provided for review.  Bilateral infiltrates persist and may be slightly decreased.  The findings may represent infectious multifocal pneumonia.  No cardiomediastinal enlargement.  No pneumothorax.  No pneumomediastinum.  No pleural effusion.  There is slight pulmonary hypoinflation.   CONCLUSION: Bilateral infiltrates are seen.  They may be slightly decreased in degree since the 9/7/2021 study.  Again, the findings may represent infectious multifocal pneumonia.  No pneumothorax is appreciated.  Probably no  cardiac enlargement.        BARBARA SHI JR, MD       Electronically Signed and Approved By: BARBARA SHI JR, MD on 9/08/2021 at 21:00               Result Review    Result Review:  I have personally reviewed the results from the time of this admission to 9/8/2021 21:57 EDT and agree with these findings:  [x]  Laboratory  []  Microbiology  [x]  Radiology  []  EKG/Telemetry   []  Cardiology/Vascular   []  Pathology  [x]  Old records  []  Other:      Assessment/Plan   Assessment / Plan     Assessment:   Multifocal pneumonia due to COVID-19  SARS-CoV-2 infection  Dyspnea on exertion due to above  Mild hypoxia on exertion due to above    Plan:    Admit to hospitalist service  Labs and imaging reviewed  Would not do remdesivir at this time  Add Decadron 6 IV QDAY  DuoNebs every 4 hours as needed  Add on LDH, procalcitonin, D-dimer, ferritin  Obtain sputum culture, strep pneumo, Legionella  Continue supplemental oxygen to maintain saturations >90%    Patient's clinical course will dictate further management  Discussed with ED provider, RN      DVT prophylaxis:  No DVT prophylaxis order currently exists.    CODE STATUS:         Admission Status:  I believe this patient meets observation status.    Electronically signed by MARY Floyd, 09/08/21, 9:57 PM EDT.

## 2021-09-09 NOTE — ED NOTES
Pt ambulated with continuous pulse ox. Pt began at 94% on room air before ambulation. Pt began ambulating and respirations wilbert to mid 40s. Pt O2 sats fell to 89% RA. Pt displaying labored breathing. Once pt laid in bed, respirations fell to lower 20's. O2 wilbert to 95/96% RA. Pt's breathing was less labored. ED NP notified.      Kierra Maza, RN  09/08/21 0568

## 2021-09-09 NOTE — PROGRESS NOTES
[facility]   Hospitalist Progress Note  Date: 2021  Patient Name: Ash Miller  : 1970  MRN: [id number]  Date of admission: 2021    Subjective   Subjective     Chief Complaint: Shortness of breath    Summary:   Ash Miller is a 50 y.o. male with no significant past medical history presented to the ED with complaints of shortness of breath.  Patient tested positive for COVID-19 8 days prior to presentation in the ED.  Evaluation in ED significant raise to be tachypneic with O2 saturation of 89% on exertion.  Lab evaluation was unremarkable and chest x-ray proved compared to previous imaging.  ED nurse practitioner did not feel comfortable discharging patient home, hospitalist service contacted for further evaluation and management.    Interval Followup:   No acute events overnight.  Patient states he continues to feel short of air and have minimal cough.  He denies any chest pain, abdominal pain, nausea or vomiting.  Nursing with no additional acute issues to report.    Review of Systems   10 point review of systems performed and negative unless stated otherwise under subjective.    Objective   Objective     Vitals:   Temp:  [98.2 °F (36.8 °C)-103.1 °F (39.5 °C)] 99.1 °F (37.3 °C)  Heart Rate:  [56-74] 59  Resp:  [20-28] 20  BP: (102-130)/(46-68) 102/56  Flow (L/min):  [2] 2  Physical Exam   Gen: No acute distress, Conversant, Pleasant, sitting up in bed watching TV and eating lunch  HEENT: MMM, Atraumatic  Neck: Supple, Trachea midline  Resp: Good aeration, no wheezes/rales/rhonchi appreciated, equal chest rise bilaterally, no increased work of breathing  Card: RRR, No m/r/g  Abd: Soft, Nontender, Nondistended, + bowel sounds  Ext: No cyanosis, No clubbing  Neuro: CN II-XII grossly intact, No focal deficits appreciated  Psych: AAO x 3, Normal mood, Normal affect    Result Review    Result Review:  I have personally reviewed the results from the time of this admission to 2021 18:01 EDT  and agree with these findings:  [x]  Laboratory: Electrolytes and renal function within normal limits.  WBC and hemoglobin stable.  D-dimer downtrending.  [x]  Microbiology: Blood culture (09/09/2021): Pending.  Strep pneumo and Legionella urinary antigens negative.  []  Radiology:   [x]  EKG/Telemetry: Sinus rhythm.  Bradycardia.  No acute events.  []  Cardiology/Vascular:    []  Pathology:  []  Old records:  []  Other:    Assessment/Plan   Assessment / Plan     Assessment:  COVID-19 pneumonia  Multifocal pneumonia due to COVID-19  Dyspnea on exertion due to above    Plan:  -Continue Decadron 6 mg daily, plan to treat for total of 10 days (Day 2/10)  -Continue supplemental O2 maintain sats greater than 90%, wean as tolerated  -Given patient's low O2 requirement will hold off on starting treatment with remdesivir this time  -Start Symbicort  -Continue duo nebs as needed every 4 hours  -Trend COVID-19 inflammatory markers  -Will monitor electrolytes and renal function with BMP and magnesium level in the AM  -Will monitor WBC and Hgb with CBC in the AM  -Clinical course will dictate further management     DVT Prophylaxis: Lovenox  Diet: Regular  Dispo: Home when medically appropriate for discharge  Code Status: Full code     Personally reviewed patients labs and imaging, discussed with patient and nurse at bedside.      Part of this note may be an electronic transcription/translation of spoken language to printed text using the Dragon dictation system.    DVT prophylaxis:  Medical DVT prophylaxis orders are present.    CODE STATUS:   Code Status: CPR  Medical Interventions (Level of Support Prior to Arrest): Full        Electronically signed by Teodoro Ybarra MD, 09/09/21, 6:01 PM EDT.

## 2021-09-09 NOTE — CASE MANAGEMENT/SOCIAL WORK
Discharge Planning Assessment   Joseph     Patient Name: Ash Miller  MRN: [id number]  Today's Date: 9/9/2021    Admit Date: 9/8/2021    Discharge Needs Assessment     Row Name 09/09/21 1011       Living Environment    Lives With  spouse;child(magnolia), dependent    Current Living Arrangements  home/apartment/condo    Primary Care Provided by  self    Family Caregiver if Needed  spouse    Quality of Family Relationships  helpful;involved;supportive    Able to Return to Prior Arrangements  yes       Transition Planning    Patient/Family Anticipates Transition to  home with family    Patient/Family Anticipated Services at Transition  none    Transportation Anticipated  family or friend will provide       Discharge Needs Assessment    Readmission Within the Last 30 Days  no previous admission in last 30 days    Equipment Currently Used at Home  cpap [facility]    Concerns to be Addressed  discharge planning    Equipment Needed After Discharge  oxygen;nebulizer        Discharge Plan     Row Name 09/09/21 1012       Plan    Plan  Called pt wife to complete assessment. Pt also in hospital in room 425 with COVID. Pt COVID+ 8/31, unvaccinated. Pt currently on 2L. Pt does not wear at home. Pt will need oxygen and possible neb through [facility] at dc. Pt usually independent and is a .  No other needs discussed.    Patient/Family in Agreement with Plan  yes        Continued Care and Services - Admitted Since 9/8/2021    Coordination has not been started for this encounter.         Demographic Summary     Row Name 09/09/21 1010       General Information    Admission Type  observation    Arrived From  emergency department    Reason for Consult  discharge planning    Preferred Language  English     Used During This Interaction  no        Functional Status     Row Name 09/09/21 1010       Functional Status    Usual Activity Tolerance  excellent    Current Activity Tolerance  moderate    Functional  Status Comments  SOA with activity       Functional Status, IADL    Medications  independent    Meal Preparation  independent    Housekeeping  independent    Laundry  independent    Shopping  independent       Employment/    Employment Status  self-employed    Current or Previous Occupation  construction        Psychosocial    No documentation.       Abuse/Neglect    No documentation.       Legal    No documentation.       Substance Abuse    No documentation.       Patient Forms    No documentation.           Sara Rosado RN

## 2021-09-09 NOTE — PLAN OF CARE
Goal Outcome Evaluation:  Plan of Care Reviewed With: patient        Progress: no change  Outcome Summary: recieved patient at 2330. Patient vitals stable with a tempurature of 99.1. patient c/o of worsening shortness of air with any exertion including speaking and walking. patient remains on 2L nasal cannula. sats above 92%.

## 2021-09-09 NOTE — ED PROVIDER NOTES
Time: 02:33 EDT  Arrived by: Car  Chief Complaint: Shortness of breath  History provided by: Patient      History of Present Illness:  Patient is a 50 y.o. year old male that presents to the emergency department with worsening shortness of breath.  He states was diagnosed with Covid 8 days ago and he got antibody infusion yesterday and does not feel any better.  He states he becomes very winded when walking.       used: No    Shortness of Breath  Severity:  Moderate  Onset quality:  Gradual  Timing:  Constant  Progression:  Worsening  Chronicity:  New  Context: activity and URI    Relieved by:  Nothing  Worsened by:  Nothing  Ineffective treatments:  None tried  Associated symptoms: cough    Associated symptoms: no abdominal pain, no chest pain, no ear pain, no fever, no headaches, no sore throat and no vomiting            Similar Symptoms Previously: No  Recently seen: Yes here yesterday      Patient Care Team  Primary Care Provider: Ms. Pura    Past Medical History:     No Known Allergies  Past Medical History:   Diagnosis Date   • Kidney stone      Past Surgical History:   Procedure Laterality Date   • ELBOW ARTHROPLASTY      right   • ROTATOR CUFF REPAIR      right     History reviewed. No pertinent family history.    Home Medications:  Prior to Admission medications    Medication Sig Start Date End Date Taking? Authorizing Provider   azithromycin (Zithromax Z-Ronny) 250 MG tablet Take 2 tablets by mouth on day 1, then 1 tablet daily on days 2-5 9/7/21   Yadira Rodrigues APRN   brompheniramine-pseudoephedrine-DM 30-2-10 MG/5ML syrup Take 5 mL by mouth. 8/31/21   Provider, MD Feng   esomeprazole (NexIUM) 10 MG packet Take 10 mg by mouth Daily.    Provider, MD Feng   predniSONE (DELTASONE) 20 MG tablet Take 3 tablets by mouth Daily for 5 days. 9/7/21 9/13/21  Yadira Rodrigues, GOVIND        Social History:   PT  reports that he has never smoked. He has never used smokeless  "tobacco. He reports previous alcohol use. He reports that he does not use drugs.    Record Review:  I have reviewed the patient's records in [facility].     Review of Systems  Review of Systems   Constitutional: Positive for fatigue. Negative for chills and fever.   HENT: Negative for congestion, ear pain and sore throat.    Eyes: Negative for pain.   Respiratory: Positive for cough and shortness of breath. Negative for chest tightness.    Cardiovascular: Negative for chest pain.   Gastrointestinal: Negative for abdominal pain, diarrhea, nausea and vomiting.   Genitourinary: Negative for flank pain and hematuria.   Musculoskeletal: Positive for myalgias. Negative for joint swelling.   Skin: Negative for pallor.   Neurological: Negative for seizures and headaches.   All other systems reviewed and are negative.       Physical Exam  /64 (BP Location: Left arm, Patient Position: Sitting)   Pulse 65   Temp 99.5 °F (37.5 °C) (Oral)   Resp 24   Ht 172.7 cm (67.99\")   Wt 90.1 kg (198 lb 10.2 oz)   SpO2 92%   BMI 30.21 kg/m²     Physical Exam  Vitals and nursing note reviewed.   Constitutional:       General: He is not in acute distress.     Appearance: Normal appearance. He is ill-appearing. He is not toxic-appearing.   HENT:      Head: Normocephalic and atraumatic.      Mouth/Throat:      Mouth: Mucous membranes are moist.   Eyes:      Extraocular Movements: Extraocular movements intact.      Pupils: Pupils are equal, round, and reactive to light.   Cardiovascular:      Rate and Rhythm: Normal rate and regular rhythm.      Pulses: Normal pulses.      Heart sounds: Normal heart sounds.   Pulmonary:      Effort: Pulmonary effort is normal. Tachypnea present. No respiratory distress.      Breath sounds: Decreased breath sounds present. No wheezing or rhonchi.      Comments: Respiratory rate at rest in the mid 30s  Abdominal:      General: Abdomen is flat.      Palpations: Abdomen is soft.      Tenderness: There is no " "abdominal tenderness.   Musculoskeletal:         General: Normal range of motion.      Cervical back: Normal range of motion and neck supple.   Skin:     General: Skin is warm and dry.   Neurological:      Mental Status: He is alert and oriented to person, place, and time. Mental status is at baseline.                  ED Course  /64 (BP Location: Left arm, Patient Position: Sitting)   Pulse 65   Temp 99.5 °F (37.5 °C) (Oral)   Resp 24   Ht 172.7 cm (67.99\")   Wt 90.1 kg (198 lb 10.2 oz)   SpO2 92%   BMI 30.21 kg/m²   Results for orders placed or performed during the hospital encounter of 09/08/21   Comprehensive Metabolic Panel    Specimen: Blood   Result Value Ref Range    Glucose 182 (H) 65 - 99 mg/dL    BUN 17 6 - 20 mg/dL    Creatinine 1.19 0.76 - 1.27 mg/dL    Sodium 134 (L) 136 - 145 mmol/L    Potassium 3.9 3.5 - 5.2 mmol/L    Chloride 100 98 - 107 mmol/L    CO2 22.1 22.0 - 29.0 mmol/L    Calcium 9.0 8.6 - 10.5 mg/dL    Total Protein 7.3 6.0 - 8.5 g/dL    Albumin 3.90 3.50 - 5.20 g/dL    ALT (SGPT) 48 (H) 1 - 41 U/L    AST (SGOT) 58 (H) 1 - 40 U/L    Alkaline Phosphatase 55 39 - 117 U/L    Total Bilirubin 0.3 0.0 - 1.2 mg/dL    eGFR Non African Amer 65 >60 mL/min/1.73    Globulin 3.4 gm/dL    A/G Ratio 1.1 g/dL    BUN/Creatinine Ratio 14.3 7.0 - 25.0    Anion Gap 11.9 5.0 - 15.0 mmol/L   BNP    Specimen: Blood   Result Value Ref Range    proBNP 128.0 0.0 - 900.0 pg/mL   Troponin    Specimen: Blood   Result Value Ref Range    Troponin T <0.010 0.000 - 0.030 ng/mL   CBC Auto Differential    Specimen: Blood   Result Value Ref Range    WBC 3.04 (L) 3.40 - 10.80 10*3/mm3    RBC 4.51 4.14 - 5.80 10*6/mm3    Hemoglobin 14.1 13.0 - 17.7 g/dL    Hematocrit 40.6 37.5 - 51.0 %    MCV 90.0 79.0 - 97.0 fL    MCH 31.3 26.6 - 33.0 pg    MCHC 34.7 31.5 - 35.7 g/dL    RDW 12.7 12.3 - 15.4 %    RDW-SD 42.5 37.0 - 54.0 fl    MPV 9.7 6.0 - 12.0 fL    Platelets 199 140 - 450 10*3/mm3    Neutrophil % 76.9 (H) 42.7 - " 76.0 %    Lymphocyte % 19.4 (L) 19.6 - 45.3 %    Monocyte % 3.0 (L) 5.0 - 12.0 %    Eosinophil % 0.0 (L) 0.3 - 6.2 %    Basophil % 0.0 0.0 - 1.5 %    Immature Grans % 0.7 (H) 0.0 - 0.5 %    Neutrophils, Absolute 2.34 1.70 - 7.00 10*3/mm3    Lymphocytes, Absolute 0.59 (L) 0.70 - 3.10 10*3/mm3    Monocytes, Absolute 0.09 (L) 0.10 - 0.90 10*3/mm3    Eosinophils, Absolute 0.00 0.00 - 0.40 10*3/mm3    Basophils, Absolute 0.00 0.00 - 0.20 10*3/mm3    Immature Grans, Absolute 0.02 0.00 - 0.05 10*3/mm3    nRBC 0.0 0.0 - 0.2 /100 WBC   D-dimer, Quantitative    Specimen: Blood   Result Value Ref Range    D-Dimer, Quantitative 0.66 (H) 0.00 - 0.59 mg/L (FEU)   ECG 12 Lead   Result Value Ref Range    QT Interval 407 ms   Green Top (Gel)   Result Value Ref Range    Extra Tube Hold for add-ons.    Lavender Top   Result Value Ref Range    Extra Tube hold for add-on    Gold Top - SST   Result Value Ref Range    Extra Tube Hold for add-ons.    Light Blue Top   Result Value Ref Range    Extra Tube hold for add-on      Medications   sodium chloride 0.9 % flush 10 mL (has no administration in time range)   dexamethasone (DECADRON) tablet 6 mg (has no administration in time range)     Or   dexamethasone (DECADRON) injection 6 mg (has no administration in time range)   enoxaparin (LOVENOX) syringe 40 mg (has no administration in time range)   ipratropium-albuterol (DUO-NEB) nebulizer solution 3 mL (has no administration in time range)   albuterol (PROVENTIL) nebulizer solution 0.083% 2.5 mg/3mL (has no administration in time range)   sodium chloride 3 % nebulizer solution 4 mL (has no administration in time range)   !Patient Home Medications Stored in Pharmacy ( Does not apply Not Given 9/9/21 0054)   acetaminophen (TYLENOL) tablet 650 mg (has no administration in time range)     XR Chest 1 View    Result Date: 9/8/2021  Narrative: PROCEDURE: XR CHEST 1 VW  COMPARISON: [facility], CR, XR CHEST 1 VW, 9/07/2021, 7:36.   INDICATIONS: SHORTNESS OF BREATH, COUGH COVID +  FINDINGS: A single PA upright portable view of the chest is provided for review.  Bilateral infiltrates persist and may be slightly decreased.  The findings may represent infectious multifocal pneumonia.  No cardiomediastinal enlargement.  No pneumothorax.  No pneumomediastinum.  No pleural effusion.  There is slight pulmonary hypoinflation.   CONCLUSION: Bilateral infiltrates are seen.  They may be slightly decreased in degree since the 9/7/2021 study.  Again, the findings may represent infectious multifocal pneumonia.  No pneumothorax is appreciated.  Probably no cardiac enlargement.        BARBARA SHI JR, MD       Electronically Signed and Approved By: BARBARA SHI JR, MD on 9/08/2021 at 21:00             XR Chest 1 View    Result Date: 9/7/2021  Narrative: PROCEDURE: XR CHEST 1 VW  COMPARISON: None  INDICATIONS: cough, shortness of breath, covid  FINDINGS:  The heart is normal in size.  The lungs are well-expanded.  Diffuse patchy airspace disease in the lungs bilaterally is consistent with multifocal pneumonia.  The differential diagnosis includes COVID-19 pneumonia.  Bony structures appear intact.  CONCLUSION:  Bilateral airspace disease consistent with multifocal pneumonia.       KYM MANJARREZ MD       Electronically Signed and Approved By: KYM MANJARREZ MD on 9/07/2021 at 8:08             CT Chest Pulmonary Embolism    Result Date: 9/7/2021  Narrative: PROCEDURE: CT CHEST PULMONARY EMBOLISM W CONTRAST  COMPARISON:  [facility], CT, ABD PEL W/O CONTRAST, 8/16/2014, 13:34. INDICATIONS: PE suspected, low/intermediate prob, positive D-dimer/covid + 1 WEEK AGO  TECHNIQUE: After obtaining the patient's consent, CT images were obtained with non-ionic intravenous contrast material.   PROTOCOL:   Pulmonary embolism imaging protocol performed    RADIATION:   DLP: 371.3mGy*cm   Automated exposure control was utilized to minimize radiation dose.  CONTRAST: 100cc Isovue 370 I.V.  FINDINGS:  No evidence for pulmonary embolus.  No evidence for acute aortic injury.  No adenopathy.  No acute findings in the included upper abdomen.  Small hiatal hernia.  Trace pleural effusions.  Patchy bilateral ground-glass opacity, predominately peripheral.  No aggressive appearing bone change.  CONCLUSION: No evidence for pulmonary embolus.  Bilateral pulmonary opacities, consistent with provided history of COVID-19.     JEFF HERNDON MD       Electronically Signed and Approved By: JEFF HERNDON MD on 9/07/2021 at 9:56               Procedures/EKGs:  Procedures      Medical Decision Making:                     MDM  Number of Diagnoses or Management Options  COVID-19: established and worsening  Dyspnea on exertion: established and worsening  Diagnosis management comments: Patient is a 50-year-old male who presents for worsening shortness of breath after a positive Covid test 1 week ago.  He was seen here yesterday and evaluated for PE (-) and subsequently discharged with orders for a general infusion.  He presents today as the symptoms have slightly worsened and he is experiencing dyspnea on exertion.  The patient will be admitted to hospitalist for observation       Amount and/or Complexity of Data Reviewed  Clinical lab tests: reviewed and ordered  Tests in the radiology section of CPT®: reviewed and ordered  Tests in the medicine section of CPT®: reviewed and ordered  Discussion of test results with the performing providers: (Discussed patient's HPI and diagnostic findings with medicine PA (hospitalist group), will admit for observation)  Decide to obtain previous medical records or to obtain history from someone other than the patient: (No PE on yesterday's CT, patient received Regeneron infusion yesterday)    Risk of Complications, Morbidity, and/or Mortality  Presenting problems: moderate  Diagnostic procedures: moderate  Management options: moderate         Final  diagnoses:   Dyspnea on exertion   COVID-19        Disposition:  ED Disposition     ED Disposition Condition Comment    Decision to Admit  Level of Care: Med/Surg [1]   Diagnosis: COVID-19 [[id number]]   Admitting Physician: MARIANA VASQUEZ [[id number]]   Attending Physician: MARIANA VASQUEZ [[id number]]             Ashley Allred APRN  09/09/21 0233

## 2021-09-10 PROBLEM — R06.09 DYSPNEA ON EXERTION: Status: ACTIVE | Noted: 2021-09-10

## 2021-09-10 LAB
ALBUMIN SERPL-MCNC: 3.6 G/DL (ref 3.5–5.2)
ALBUMIN/GLOB SERPL: 1.2 G/DL
ALP SERPL-CCNC: 52 U/L (ref 39–117)
ALT SERPL W P-5'-P-CCNC: 40 U/L (ref 1–41)
ANION GAP SERPL CALCULATED.3IONS-SCNC: 9.5 MMOL/L (ref 5–15)
AST SERPL-CCNC: 46 U/L (ref 1–40)
BASOPHILS # BLD AUTO: 0.01 10*3/MM3 (ref 0–0.2)
BASOPHILS NFR BLD AUTO: 0.2 % (ref 0–1.5)
BILIRUB SERPL-MCNC: 0.3 MG/DL (ref 0–1.2)
BUN SERPL-MCNC: 16 MG/DL (ref 6–20)
BUN/CREAT SERPL: 17.6 (ref 7–25)
CALCIUM SPEC-SCNC: 8.9 MG/DL (ref 8.6–10.5)
CHLORIDE SERPL-SCNC: 103 MMOL/L (ref 98–107)
CK SERPL-CCNC: 146 U/L (ref 20–200)
CO2 SERPL-SCNC: 25.5 MMOL/L (ref 22–29)
CREAT SERPL-MCNC: 0.91 MG/DL (ref 0.76–1.27)
CRP SERPL-MCNC: 8.99 MG/DL (ref 0–0.5)
DEPRECATED RDW RBC AUTO: 43.2 FL (ref 37–54)
EOSINOPHIL # BLD AUTO: 0 10*3/MM3 (ref 0–0.4)
EOSINOPHIL NFR BLD AUTO: 0 % (ref 0.3–6.2)
ERYTHROCYTE [DISTWIDTH] IN BLOOD BY AUTOMATED COUNT: 12.9 % (ref 12.3–15.4)
FERRITIN SERPL-MCNC: 1286 NG/ML (ref 30–400)
GFR SERPL CREATININE-BSD FRML MDRD: 88 ML/MIN/1.73
GLOBULIN UR ELPH-MCNC: 3.1 GM/DL
GLUCOSE SERPL-MCNC: 128 MG/DL (ref 65–99)
HCT VFR BLD AUTO: 39.4 % (ref 37.5–51)
HGB BLD-MCNC: 13.3 G/DL (ref 13–17.7)
IMM GRANULOCYTES # BLD AUTO: 0.05 10*3/MM3 (ref 0–0.05)
IMM GRANULOCYTES NFR BLD AUTO: 0.8 % (ref 0–0.5)
LYMPHOCYTES # BLD AUTO: 1.28 10*3/MM3 (ref 0.7–3.1)
LYMPHOCYTES NFR BLD AUTO: 20.3 % (ref 19.6–45.3)
MAGNESIUM SERPL-MCNC: 2 MG/DL (ref 1.6–2.6)
MCH RBC QN AUTO: 30.8 PG (ref 26.6–33)
MCHC RBC AUTO-ENTMCNC: 33.8 G/DL (ref 31.5–35.7)
MCV RBC AUTO: 91.2 FL (ref 79–97)
MONOCYTES # BLD AUTO: 0.61 10*3/MM3 (ref 0.1–0.9)
MONOCYTES NFR BLD AUTO: 9.7 % (ref 5–12)
NEUTROPHILS NFR BLD AUTO: 4.36 10*3/MM3 (ref 1.7–7)
NEUTROPHILS NFR BLD AUTO: 69 % (ref 42.7–76)
NRBC BLD AUTO-RTO: 0 /100 WBC (ref 0–0.2)
PLATELET # BLD AUTO: 245 10*3/MM3 (ref 140–450)
PMV BLD AUTO: 9.5 FL (ref 6–12)
POTASSIUM SERPL-SCNC: 4.5 MMOL/L (ref 3.5–5.2)
PROT SERPL-MCNC: 6.7 G/DL (ref 6–8.5)
RBC # BLD AUTO: 4.32 10*6/MM3 (ref 4.14–5.8)
SODIUM SERPL-SCNC: 138 MMOL/L (ref 136–145)
WBC # BLD AUTO: 6.31 10*3/MM3 (ref 3.4–10.8)

## 2021-09-10 PROCEDURE — 94799 UNLISTED PULMONARY SVC/PX: CPT

## 2021-09-10 PROCEDURE — 99233 SBSQ HOSP IP/OBS HIGH 50: CPT | Performed by: INTERNAL MEDICINE

## 2021-09-10 PROCEDURE — 63710000001 DEXAMETHASONE PER 0.25 MG: Performed by: PHYSICIAN ASSISTANT

## 2021-09-10 PROCEDURE — 25010000002 ENOXAPARIN PER 10 MG: Performed by: PHYSICIAN ASSISTANT

## 2021-09-10 PROCEDURE — 85025 COMPLETE CBC W/AUTO DIFF WBC: CPT | Performed by: PHYSICIAN ASSISTANT

## 2021-09-10 PROCEDURE — 94760 N-INVAS EAR/PLS OXIMETRY 1: CPT

## 2021-09-10 PROCEDURE — 94660 CPAP INITIATION&MGMT: CPT

## 2021-09-10 PROCEDURE — 83735 ASSAY OF MAGNESIUM: CPT | Performed by: PHYSICIAN ASSISTANT

## 2021-09-10 PROCEDURE — 86140 C-REACTIVE PROTEIN: CPT | Performed by: PHYSICIAN ASSISTANT

## 2021-09-10 PROCEDURE — 94640 AIRWAY INHALATION TREATMENT: CPT

## 2021-09-10 PROCEDURE — 80053 COMPREHEN METABOLIC PANEL: CPT | Performed by: PHYSICIAN ASSISTANT

## 2021-09-10 PROCEDURE — 82728 ASSAY OF FERRITIN: CPT | Performed by: PHYSICIAN ASSISTANT

## 2021-09-10 PROCEDURE — 82550 ASSAY OF CK (CPK): CPT | Performed by: PHYSICIAN ASSISTANT

## 2021-09-10 PROCEDURE — XW033E5 INTRODUCTION OF REMDESIVIR ANTI-INFECTIVE INTO PERIPHERAL VEIN, PERCUTANEOUS APPROACH, NEW TECHNOLOGY GROUP 5: ICD-10-PCS | Performed by: INTERNAL MEDICINE

## 2021-09-10 RX ORDER — IPRATROPIUM BROMIDE AND ALBUTEROL SULFATE 2.5; .5 MG/3ML; MG/3ML
3 SOLUTION RESPIRATORY (INHALATION)
Status: DISCONTINUED | OUTPATIENT
Start: 2021-09-10 | End: 2021-09-12 | Stop reason: HOSPADM

## 2021-09-10 RX ADMIN — IPRATROPIUM BROMIDE AND ALBUTEROL SULFATE 3 ML: .5; 2.5 SOLUTION RESPIRATORY (INHALATION) at 20:26

## 2021-09-10 RX ADMIN — REMDESIVIR 200 MG: 100 INJECTION, POWDER, LYOPHILIZED, FOR SOLUTION INTRAVENOUS at 18:20

## 2021-09-10 RX ADMIN — Medication 5 MG: at 21:04

## 2021-09-10 RX ADMIN — BUDESONIDE AND FORMOTEROL FUMARATE DIHYDRATE 2 PUFF: 160; 4.5 AEROSOL RESPIRATORY (INHALATION) at 08:04

## 2021-09-10 RX ADMIN — ACETAMINOPHEN 650 MG: 325 TABLET ORAL at 21:05

## 2021-09-10 RX ADMIN — BUDESONIDE AND FORMOTEROL FUMARATE DIHYDRATE 2 PUFF: 160; 4.5 AEROSOL RESPIRATORY (INHALATION) at 20:27

## 2021-09-10 RX ADMIN — DEXAMETHASONE 6 MG: 0.5 TABLET ORAL at 09:24

## 2021-09-10 RX ADMIN — ENOXAPARIN SODIUM 40 MG: 40 INJECTION SUBCUTANEOUS at 09:23

## 2021-09-10 RX ADMIN — BUDESONIDE AND FORMOTEROL FUMARATE DIHYDRATE 2 PUFF: 160; 4.5 AEROSOL RESPIRATORY (INHALATION) at 20:26

## 2021-09-10 NOTE — PLAN OF CARE
Goal Outcome Evaluation:  Plan of Care Reviewed With: patient           Outcome Summary: Has been afebrile, but has short episodes of bradycardia a few seconds at a time. Emily HERNANDEZ, notified. Patient denies any other symptomology.me

## 2021-09-10 NOTE — PROGRESS NOTES
[facility]   Hospitalist Progress Note  Date: 9/10/2021  Patient Name: Ash Miller  : 1970  MRN: [id number]  Date of admission: 2021    Subjective   Subjective     Chief Complaint: Shortness of breath    Summary:   Ash Miller is a 50 y.o. male with no significant past medical history presented to the ED with complaints of shortness of breath.  Patient tested positive for COVID-19 8 days prior to presentation in the ED.  Evaluation in ED significant raise to be tachypneic with O2 saturation of 89% on exertion.  Lab evaluation was unremarkable and chest x-ray proved compared to previous imaging.  ED nurse practitioner did not feel comfortable discharging patient home, hospitalist service contacted for further evaluation and management.    Interval Followup:   No acute events overnight.  Patient states he continues to feel short of air and have minimal cough.  He denies any chest pain, abdominal pain, nausea or vomiting.  Nursing with no additional acute issues to report.  Remains on 2L of oxgyen. Short of breath with activity.     Review of Systems  History obtained from the patient  General ROS: Positive for Fatigue,and fever  Psychological ROS: anxiety over wife being sick   Ophthalmic ROS: negative for - blurry vision, double vision, or itchy eyes  ENT ROS: negative for - headaches, nasal congestion, sneezing, or sore throat  Hematological and Lymphatic ROS: negative for - bleeding problems, bruising, or jaundice  Endocrine ROS: negative for - malaise/lethargy, polydipsia/polyuria, or temperature intolerance  Respiratory ROS: cough, short of air at rest and with exertion   Cardiovascular ROS: negative for - chest pain, dyspnea on exertion, edema, palpitations, or paroxysmal nocturnal dyspnea  Gastrointestinal ROS: negative for - abdominal pain, constipation, diarrhea, heartburn, hematemesis, or nausea/vomiting  Genito-Urinary ROS: negative for - change in urinary stream, hematuria,  incontinence, or urinary frequency/urgency  Musculoskeletal ROS: negative for - joint stiffness, joint swelling, muscle pain, or muscular weakness  Neurological ROS: negative for - confusion, dizziness, gait disturbance, headaches, impaired coordination/balance, memory loss, numbness/tingling, seizures, or visual changes  Dermatological ROS: negative for dry skin and rash       Objective   Objective     Vitals:   Temp:  [97.3 °F (36.3 °C)-98.2 °F (36.8 °C)] 97.9 °F (36.6 °C)  Heart Rate:  [48-65] 56  Resp:  [20] 20  BP: (101-119)/(44-71) 103/44  Flow (L/min):  [2] 2  Physical Exam   Gen: No acute distress, sitting in bedside chair   HEENT: MMM, Atraumatic  Neck: Supple,   Resp: Good aeration, no wheezes/rales/rhonchi appreciated, equal chest rise bilaterally, no increased work of breathing  Card: RRR, No m/r/g  Abd: Soft, Nontender, Nondistended, + bowel sounds  Ext: No cyanosis, No clubbing  Neuro: CN II-XII grossly intact, No focal deficits appreciated  Psych: AAO x 3, Normal mood, Normal affect    Result Review    Result Review:  I have personally reviewed the results from the time of this admission to 9/10/2021 16:23 EDT and agree with these findings:  [x]  Laboratory: Electrolytes and renal function within normal limits.  WBC and hemoglobin stable.  D-dimer downtrending.  [x]  Microbiology: Blood culture (09/09/2021): Pending.  Strep pneumo and Legionella urinary antigens negative.  []  Radiology:   [x]  EKG/Telemetry: Sinus rhythm.  Bradycardia.  No acute events.  []  Cardiology/Vascular:    []  Pathology:  []  Old records:  []  Other:    Assessment/Plan   Assessment / Plan     Assessment:  COVID-19 pneumonia  Multifocal pneumonia due to COVID-19  Dyspnea on exertion due to above    Plan:  -Continue Decadron 6 mg daily, plan to treat for total of 10 days (Day 3/10)  -Continue supplemental O2 maintain sats greater than 90%, wean as tolerated. Currently on 2L   -Pharmacy consult for remdesivir   -Start  Symbicort  -Continue duo nebs as needed every 4 hours  -Trend COVID-19 inflammatory markers  -Will monitor electrolytes and renal function with BMP and magnesium level in the AM       DVT Prophylaxis: Lovenox  Diet: Regular  Dispo: Home when medically appropriate for discharge  Code Status: Full code     Personally reviewed patients labs and imaging, discussed with patient and nurse at bedside.       DVT prophylaxis:  Medical DVT prophylaxis orders are present.    CODE STATUS:   Code Status: CPR  Medical Interventions (Level of Support Prior to Arrest): Full

## 2021-09-10 NOTE — ACP (ADVANCE CARE PLANNING)
Advance Directive was completed with pt at the bedside. Pt selected his wife, Peri Miller as his primary healthcare surrogate and made his daughter Laura Ny his secondary healthcare surrogate.

## 2021-09-11 LAB
ALBUMIN SERPL-MCNC: 3.3 G/DL (ref 3.5–5.2)
ALBUMIN/GLOB SERPL: 1.2 G/DL
ALP SERPL-CCNC: 46 U/L (ref 39–117)
ALT SERPL W P-5'-P-CCNC: 38 U/L (ref 1–41)
ANION GAP SERPL CALCULATED.3IONS-SCNC: 11.8 MMOL/L (ref 5–15)
AST SERPL-CCNC: 41 U/L (ref 1–40)
BASOPHILS # BLD AUTO: 0.01 10*3/MM3 (ref 0–0.2)
BASOPHILS NFR BLD AUTO: 0.1 % (ref 0–1.5)
BILIRUB SERPL-MCNC: 0.3 MG/DL (ref 0–1.2)
BUN SERPL-MCNC: 16 MG/DL (ref 6–20)
BUN/CREAT SERPL: 21.9 (ref 7–25)
CALCIUM SPEC-SCNC: 8.6 MG/DL (ref 8.6–10.5)
CHLORIDE SERPL-SCNC: 106 MMOL/L (ref 98–107)
CO2 SERPL-SCNC: 23.2 MMOL/L (ref 22–29)
CREAT SERPL-MCNC: 0.73 MG/DL (ref 0.76–1.27)
DEPRECATED RDW RBC AUTO: 43.1 FL (ref 37–54)
EOSINOPHIL # BLD AUTO: 0 10*3/MM3 (ref 0–0.4)
EOSINOPHIL NFR BLD AUTO: 0 % (ref 0.3–6.2)
ERYTHROCYTE [DISTWIDTH] IN BLOOD BY AUTOMATED COUNT: 13 % (ref 12.3–15.4)
GFR SERPL CREATININE-BSD FRML MDRD: 114 ML/MIN/1.73
GLOBULIN UR ELPH-MCNC: 2.8 GM/DL
GLUCOSE SERPL-MCNC: 126 MG/DL (ref 65–99)
HCT VFR BLD AUTO: 37.7 % (ref 37.5–51)
HGB BLD-MCNC: 12.7 G/DL (ref 13–17.7)
IMM GRANULOCYTES # BLD AUTO: 0.08 10*3/MM3 (ref 0–0.05)
IMM GRANULOCYTES NFR BLD AUTO: 1 % (ref 0–0.5)
LYMPHOCYTES # BLD AUTO: 1.6 10*3/MM3 (ref 0.7–3.1)
LYMPHOCYTES NFR BLD AUTO: 20.7 % (ref 19.6–45.3)
MAGNESIUM SERPL-MCNC: 2 MG/DL (ref 1.6–2.6)
MCH RBC QN AUTO: 30.5 PG (ref 26.6–33)
MCHC RBC AUTO-ENTMCNC: 33.7 G/DL (ref 31.5–35.7)
MCV RBC AUTO: 90.6 FL (ref 79–97)
MONOCYTES # BLD AUTO: 0.7 10*3/MM3 (ref 0.1–0.9)
MONOCYTES NFR BLD AUTO: 9 % (ref 5–12)
NEUTROPHILS NFR BLD AUTO: 5.35 10*3/MM3 (ref 1.7–7)
NEUTROPHILS NFR BLD AUTO: 69.2 % (ref 42.7–76)
NRBC BLD AUTO-RTO: 0 /100 WBC (ref 0–0.2)
PLATELET # BLD AUTO: 274 10*3/MM3 (ref 140–450)
PMV BLD AUTO: 9.6 FL (ref 6–12)
POTASSIUM SERPL-SCNC: 4 MMOL/L (ref 3.5–5.2)
PROT SERPL-MCNC: 6.1 G/DL (ref 6–8.5)
RBC # BLD AUTO: 4.16 10*6/MM3 (ref 4.14–5.8)
RBC MORPH BLD: NORMAL
SMALL PLATELETS BLD QL SMEAR: ADEQUATE
SODIUM SERPL-SCNC: 141 MMOL/L (ref 136–145)
WBC # BLD AUTO: 7.74 10*3/MM3 (ref 3.4–10.8)
WBC MORPH BLD: NORMAL

## 2021-09-11 PROCEDURE — 99232 SBSQ HOSP IP/OBS MODERATE 35: CPT | Performed by: INTERNAL MEDICINE

## 2021-09-11 PROCEDURE — 80053 COMPREHEN METABOLIC PANEL: CPT | Performed by: PHYSICIAN ASSISTANT

## 2021-09-11 PROCEDURE — 85025 COMPLETE CBC W/AUTO DIFF WBC: CPT | Performed by: PHYSICIAN ASSISTANT

## 2021-09-11 PROCEDURE — 25010000002 DEXAMETHASONE PER 1 MG: Performed by: PHYSICIAN ASSISTANT

## 2021-09-11 PROCEDURE — 94760 N-INVAS EAR/PLS OXIMETRY 1: CPT

## 2021-09-11 PROCEDURE — 83735 ASSAY OF MAGNESIUM: CPT | Performed by: PHYSICIAN ASSISTANT

## 2021-09-11 PROCEDURE — 94799 UNLISTED PULMONARY SVC/PX: CPT

## 2021-09-11 PROCEDURE — 25010000002 ENOXAPARIN PER 10 MG: Performed by: PHYSICIAN ASSISTANT

## 2021-09-11 PROCEDURE — 3E0333Z INTRODUCTION OF ANTI-INFLAMMATORY INTO PERIPHERAL VEIN, PERCUTANEOUS APPROACH: ICD-10-PCS | Performed by: INTERNAL MEDICINE

## 2021-09-11 PROCEDURE — 85007 BL SMEAR W/DIFF WBC COUNT: CPT | Performed by: PHYSICIAN ASSISTANT

## 2021-09-11 RX ADMIN — BUDESONIDE AND FORMOTEROL FUMARATE DIHYDRATE 2 PUFF: 160; 4.5 AEROSOL RESPIRATORY (INHALATION) at 08:59

## 2021-09-11 RX ADMIN — ENOXAPARIN SODIUM 40 MG: 40 INJECTION SUBCUTANEOUS at 08:35

## 2021-09-11 RX ADMIN — IPRATROPIUM BROMIDE AND ALBUTEROL SULFATE 3 ML: .5; 2.5 SOLUTION RESPIRATORY (INHALATION) at 20:38

## 2021-09-11 RX ADMIN — IPRATROPIUM BROMIDE AND ALBUTEROL SULFATE 3 ML: .5; 2.5 SOLUTION RESPIRATORY (INHALATION) at 14:13

## 2021-09-11 RX ADMIN — DEXAMETHASONE SODIUM PHOSPHATE 6 MG: 10 INJECTION INTRAMUSCULAR; INTRAVENOUS at 08:36

## 2021-09-11 RX ADMIN — IPRATROPIUM BROMIDE AND ALBUTEROL SULFATE 3 ML: .5; 2.5 SOLUTION RESPIRATORY (INHALATION) at 00:19

## 2021-09-11 RX ADMIN — BUDESONIDE AND FORMOTEROL FUMARATE DIHYDRATE 2 PUFF: 160; 4.5 AEROSOL RESPIRATORY (INHALATION) at 20:38

## 2021-09-11 RX ADMIN — IPRATROPIUM BROMIDE AND ALBUTEROL SULFATE 3 ML: .5; 2.5 SOLUTION RESPIRATORY (INHALATION) at 08:58

## 2021-09-11 RX ADMIN — REMDESIVIR 100 MG: 100 INJECTION, POWDER, LYOPHILIZED, FOR SOLUTION INTRAVENOUS at 13:59

## 2021-09-11 RX ADMIN — ACETAMINOPHEN 650 MG: 325 TABLET ORAL at 21:17

## 2021-09-11 RX ADMIN — Medication 5 MG: at 21:17

## 2021-09-11 NOTE — PAYOR COMM NOTE
"OBS 9/8  INP 9/10  [facility] NPI [id number]  [address] [place]  DR GIA BRITO NPI [id number]  DX R06.00  U07.1  DEBBIE  [phone]       Ash Miller (50 y.o. Male)     Date of Birth Social Security Number Address Home Phone MRN    1970  [address] [place] [place] [address] [phone] [id number]    [employer] Marital Status          Ep[employer]op[employer]        Admission Date Admission Type Admitting Provider Attending Provider Department, Room/Bed    9/8/21 Emergency ChairezSweta DO Schulz, Jamie, DO [facility] 4TH FLOOR MEDICAL TELEMETRY UNIT, 425/2    Discharge Date Discharge Disposition Discharge Destination                       Attending Provider: Gia Brito DO    Allergies: No Known Allergies    Isolation: Contact Air   Infection: COVID (confirmed) (09/09/21)   Code Status: CPR    Ht: 172.7 cm (67.99\")   Wt: 90.1 kg (198 lb 10.2 oz)    Admission Cmt: None   Principal Problem: None                Active Insurance as of 9/8/2021     Primary Coverage     Payor Plan Insurance Group Employer/Plan Group    [employer] [id number]     Payor Plan Address Payor Plan Phone Number Payor Plan Fax Number Effective Dates    [address] [phone]  1/1/2021 - None Entered    [facility] [id number]       Subscriber Name Subscriber Birth Date Member ID       JENIFFER MILLER 11/20/1969 [id number]                 Emergency Contacts      (Rel.) Home Phone Work Phone Mobile Phone    PAUL JENFIFER (Spouse) -- -- [phone]    NyLaura thomas (Daughter) -- -- [phone]    PaulJigar (Son) -- -- [phone]            "

## 2021-09-11 NOTE — PROGRESS NOTES
[facility]   Hospitalist Progress Note  Date: 2021  Patient Name: Ash Miller  : 1970  MRN: [id number]  Date of admission: 2021    Subjective   Subjective     Chief Complaint: Shortness of breath    Summary:   Ash Miller is a 50 y.o. male with no significant past medical history presented to the ED with complaints of shortness of breath.  Patient tested positive for COVID-19 8 days prior to presentation in the ED.  Evaluation in ED significant raise to be tachypneic with O2 saturation of 89% on exertion.  Lab evaluation was unremarkable and chest x-ray proved compared to previous imaging.  ED nurse practitioner did not feel comfortable discharging patient home, hospitalist service contacted for further evaluation and management.    Interval Followup:   No acute events overnight.  Patient states he continues to feel short of air and have minimal cough.  He denies any chest pain, abdominal pain, nausea or vomiting.  Nursing with no additional acute issues to report.  Remains on 2L of oxgyen. Short of breath with activity.     Review of Systems  History obtained from the patient  General ROS: Positive for Fatigue,and fever  Psychological ROS: anxiety over wife being sick   Ophthalmic ROS: negative for - blurry vision, double vision, or itchy eyes  ENT ROS: negative for - headaches, nasal congestion, sneezing, or sore throat  Hematological and Lymphatic ROS: negative for - bleeding problems, bruising, or jaundice  Endocrine ROS: negative for - malaise/lethargy, polydipsia/polyuria, or temperature intolerance  Respiratory ROS: cough, short of air at rest and with exertion   Cardiovascular ROS: negative for - chest pain, dyspnea on exertion, edema, palpitations, or paroxysmal nocturnal dyspnea  Gastrointestinal ROS: negative for - abdominal pain, constipation, diarrhea, heartburn, hematemesis, or nausea/vomiting  Genito-Urinary ROS: negative for - change in urinary stream, hematuria,  incontinence, or urinary frequency/urgency  Musculoskeletal ROS: negative for - joint stiffness, joint swelling, muscle pain, or muscular weakness  Neurological ROS: negative for - confusion, dizziness, gait disturbance, headaches, impaired coordination/balance, memory loss, numbness/tingling, seizures, or visual changes  Dermatological ROS: negative for dry skin and rash       Objective   Objective     Vitals:   Temp:  [98 °F (36.7 °C)-98.2 °F (36.8 °C)] 98 °F (36.7 °C)  Heart Rate:  [41-56] 56  Resp:  [18-20] 18  BP: (105-128)/(49-65) 105/49  Flow (L/min):  [2] 2  Physical Exam   Gen: No acute distress, sitting in bedside chair   HEENT: MMM, Atraumatic  Neck: Supple,   Resp: Good aeration, no wheezes/rales/rhonchi   Card: RRR, No murmurs   Abd: Soft, Nontender, Nondistended, + bowel sounds  Ext: No cyanosis, No clubbing  Neuro: CN II-XII grossly intact, No focal deficits appreciated  Psych: AAO x 3, Normal mood, Normal affect    Result Review    Result Review:  I have personally reviewed the results from the time of this admission to 9/11/2021 14:59 EDT and agree with these findings:  [x]  Laboratory: Electrolytes and renal function within normal limits.  WBC and hemoglobin stable.  D-dimer downtrending.  [x]  Microbiology: Blood culture (09/09/2021): Pending.  Strep pneumo and Legionella urinary antigens negative.  []  Radiology:   [x]  EKG/Telemetry: Sinus rhythm.  Bradycardia.  No acute events.  []  Cardiology/Vascular:    []  Pathology:  []  Old records:  []  Other:    Assessment/Plan   Assessment / Plan     Assessment:  COVID-19 pneumonia  Multifocal pneumonia due to COVID-19  Dyspnea on exertion due to above    Plan:  -Continue Decadron 6 mg daily, plan to treat for total of 10 days (Day 4/10)  -Continue supplemental O2 maintain sats greater than 90%, wean as tolerated. Currently on 2L   -Pharmacy consult for remdesivir day 2/5   -Continue Symbicort  -Continue duo nebs as needed every 4 hours  -Trend  COVID-19 inflammatory markers  -Will monitor electrolytes and renal function with BMP and magnesium level in the AM  -Anticipate discharge home in the morning with oxygen        DVT Prophylaxis: Lovenox  Diet: Regular  Dispo: Home when medically appropriate for discharge  Code Status: Full code     Personally reviewed patients labs and imaging, discussed with patient and nurse at bedside.       DVT prophylaxis:  Medical DVT prophylaxis orders are present.    CODE STATUS:   Code Status: CPR  Medical Interventions (Level of Support Prior to Arrest): Full

## 2021-09-11 NOTE — PLAN OF CARE
Goal Outcome Evaluation:  Plan of Care Reviewed With: patient        Progress: no change   Patient complains of shortness of air upon deep breathing. Vital signs stable. O2 sats good on 2LNC. Will continue to monitor. Laura Pollard RN

## 2021-09-11 NOTE — PLAN OF CARE
Goal Outcome Evaluation:  Plan of Care Reviewed With: patient        Progress: no change  Outcome Summary: No acute issues. Pt with asymptomatic bradycardia. Pt still on 2L NC. Ambulating around room with no difficulty. Possible discharge tomorrow. VSS.

## 2021-09-12 ENCOUNTER — READMISSION MANAGEMENT (OUTPATIENT)
Dept: CALL CENTER | Facility: HOSPITAL | Age: 51
End: 2021-09-12

## 2021-09-12 VITALS
BODY MASS INDEX: 30.1 KG/M2 | DIASTOLIC BLOOD PRESSURE: 66 MMHG | RESPIRATION RATE: 18 BRPM | WEIGHT: 198.63 LBS | HEIGHT: 68 IN | SYSTOLIC BLOOD PRESSURE: 122 MMHG | TEMPERATURE: 98.6 F | OXYGEN SATURATION: 92 % | HEART RATE: 75 BPM

## 2021-09-12 PROBLEM — D89.831 CYTOKINE RELEASE SYNDROME, GRADE 1: Status: ACTIVE | Noted: 2021-09-12

## 2021-09-12 LAB
ALBUMIN SERPL-MCNC: 3.1 G/DL (ref 3.5–5.2)
ALBUMIN/GLOB SERPL: 1.1 G/DL
ALP SERPL-CCNC: 44 U/L (ref 39–117)
ALT SERPL W P-5'-P-CCNC: 35 U/L (ref 1–41)
ANION GAP SERPL CALCULATED.3IONS-SCNC: 12.5 MMOL/L (ref 5–15)
AST SERPL-CCNC: 32 U/L (ref 1–40)
BASOPHILS # BLD AUTO: 0.02 10*3/MM3 (ref 0–0.2)
BASOPHILS NFR BLD AUTO: 0.2 % (ref 0–1.5)
BILIRUB SERPL-MCNC: 0.3 MG/DL (ref 0–1.2)
BUN SERPL-MCNC: 18 MG/DL (ref 6–20)
BUN/CREAT SERPL: 22.8 (ref 7–25)
CALCIUM SPEC-SCNC: 8.7 MG/DL (ref 8.6–10.5)
CHLORIDE SERPL-SCNC: 105 MMOL/L (ref 98–107)
CO2 SERPL-SCNC: 22.5 MMOL/L (ref 22–29)
CREAT SERPL-MCNC: 0.79 MG/DL (ref 0.76–1.27)
DEPRECATED RDW RBC AUTO: 42.7 FL (ref 37–54)
EOSINOPHIL # BLD AUTO: 0 10*3/MM3 (ref 0–0.4)
EOSINOPHIL NFR BLD AUTO: 0 % (ref 0.3–6.2)
ERYTHROCYTE [DISTWIDTH] IN BLOOD BY AUTOMATED COUNT: 12.9 % (ref 12.3–15.4)
GFR SERPL CREATININE-BSD FRML MDRD: 104 ML/MIN/1.73
GLOBULIN UR ELPH-MCNC: 2.8 GM/DL
GLUCOSE SERPL-MCNC: 131 MG/DL (ref 65–99)
HCT VFR BLD AUTO: 34.8 % (ref 37.5–51)
HGB BLD-MCNC: 11.9 G/DL (ref 13–17.7)
IMM GRANULOCYTES # BLD AUTO: 0.16 10*3/MM3 (ref 0–0.05)
IMM GRANULOCYTES NFR BLD AUTO: 1.9 % (ref 0–0.5)
LYMPHOCYTES # BLD AUTO: 1.73 10*3/MM3 (ref 0.7–3.1)
LYMPHOCYTES NFR BLD AUTO: 20.3 % (ref 19.6–45.3)
MAGNESIUM SERPL-MCNC: 2 MG/DL (ref 1.6–2.6)
MCH RBC QN AUTO: 30.8 PG (ref 26.6–33)
MCHC RBC AUTO-ENTMCNC: 34.2 G/DL (ref 31.5–35.7)
MCV RBC AUTO: 90.2 FL (ref 79–97)
MONOCYTES # BLD AUTO: 0.74 10*3/MM3 (ref 0.1–0.9)
MONOCYTES NFR BLD AUTO: 8.7 % (ref 5–12)
NEUTROPHILS NFR BLD AUTO: 5.87 10*3/MM3 (ref 1.7–7)
NEUTROPHILS NFR BLD AUTO: 68.9 % (ref 42.7–76)
NRBC BLD AUTO-RTO: 0 /100 WBC (ref 0–0.2)
PLATELET # BLD AUTO: 294 10*3/MM3 (ref 140–450)
PMV BLD AUTO: 9.8 FL (ref 6–12)
POTASSIUM SERPL-SCNC: 3.6 MMOL/L (ref 3.5–5.2)
PROT SERPL-MCNC: 5.9 G/DL (ref 6–8.5)
RBC # BLD AUTO: 3.86 10*6/MM3 (ref 4.14–5.8)
SODIUM SERPL-SCNC: 140 MMOL/L (ref 136–145)
WBC # BLD AUTO: 8.52 10*3/MM3 (ref 3.4–10.8)

## 2021-09-12 PROCEDURE — 94799 UNLISTED PULMONARY SVC/PX: CPT

## 2021-09-12 PROCEDURE — 25010000002 ENOXAPARIN PER 10 MG: Performed by: PHYSICIAN ASSISTANT

## 2021-09-12 PROCEDURE — 94760 N-INVAS EAR/PLS OXIMETRY 1: CPT

## 2021-09-12 PROCEDURE — 83735 ASSAY OF MAGNESIUM: CPT | Performed by: PHYSICIAN ASSISTANT

## 2021-09-12 PROCEDURE — 85025 COMPLETE CBC W/AUTO DIFF WBC: CPT | Performed by: PHYSICIAN ASSISTANT

## 2021-09-12 PROCEDURE — 99239 HOSP IP/OBS DSCHRG MGMT >30: CPT | Performed by: INTERNAL MEDICINE

## 2021-09-12 PROCEDURE — 80053 COMPREHEN METABOLIC PANEL: CPT | Performed by: PHYSICIAN ASSISTANT

## 2021-09-12 PROCEDURE — 63710000001 DEXAMETHASONE PER 0.25 MG: Performed by: PHYSICIAN ASSISTANT

## 2021-09-12 RX ORDER — DEXAMETHASONE 6 MG/1
6 TABLET ORAL DAILY
Qty: 6 TABLET | Refills: 0 | Status: SHIPPED | OUTPATIENT
Start: 2021-09-13 | End: 2021-09-19

## 2021-09-12 RX ORDER — BUDESONIDE AND FORMOTEROL FUMARATE DIHYDRATE 160; 4.5 UG/1; UG/1
2 AEROSOL RESPIRATORY (INHALATION)
Qty: 1 EACH | Refills: 0 | Status: SHIPPED | OUTPATIENT
Start: 2021-09-12 | End: 2022-02-08

## 2021-09-12 RX ORDER — ALBUTEROL SULFATE 90 UG/1
2 AEROSOL, METERED RESPIRATORY (INHALATION) EVERY 4 HOURS PRN
Qty: 18 G | Refills: 0 | Status: SHIPPED | OUTPATIENT
Start: 2021-09-12 | End: 2022-02-08

## 2021-09-12 RX ADMIN — IPRATROPIUM BROMIDE AND ALBUTEROL SULFATE 3 ML: .5; 2.5 SOLUTION RESPIRATORY (INHALATION) at 14:14

## 2021-09-12 RX ADMIN — DEXAMETHASONE 6 MG: 0.5 TABLET ORAL at 08:35

## 2021-09-12 RX ADMIN — REMDESIVIR 100 MG: 100 INJECTION, POWDER, LYOPHILIZED, FOR SOLUTION INTRAVENOUS at 13:52

## 2021-09-12 RX ADMIN — IPRATROPIUM BROMIDE AND ALBUTEROL SULFATE 3 ML: .5; 2.5 SOLUTION RESPIRATORY (INHALATION) at 00:32

## 2021-09-12 RX ADMIN — BUDESONIDE AND FORMOTEROL FUMARATE DIHYDRATE 2 PUFF: 160; 4.5 AEROSOL RESPIRATORY (INHALATION) at 08:20

## 2021-09-12 RX ADMIN — SODIUM CHLORIDE, PRESERVATIVE FREE 10 ML: 5 INJECTION INTRAVENOUS at 08:36

## 2021-09-12 RX ADMIN — IPRATROPIUM BROMIDE AND ALBUTEROL SULFATE 3 ML: .5; 2.5 SOLUTION RESPIRATORY (INHALATION) at 08:12

## 2021-09-12 RX ADMIN — ENOXAPARIN SODIUM 40 MG: 40 INJECTION SUBCUTANEOUS at 08:35

## 2021-09-12 NOTE — PLAN OF CARE
Goal Outcome Evaluation:  Plan of Care Reviewed With: patient, spouse        Progress: improving  Outcome Summary: No acute issues. Pt to dc home with oxygen. VSS.

## 2021-09-12 NOTE — SIGNIFICANT NOTE
09/12/21 1405   Plan   Final Note Patient is discharging home today. Patient needs home oxygen setup. [facility] sent DME referral to [facility]. COLLEEN provided patient with oxygen POC, pulse oximeter to go home with.

## 2021-09-12 NOTE — DISCHARGE SUMMARY
[facility]         HOSPITALIST  DISCHARGE SUMMARY    Patient Name: Ash Miller  : 1970  MRN: [id number]    Date of Admission: 2021  Date of Discharge:  2021    Primary Care Physician: Rocio Fernandez APRN    Consults     Date and Time Order Name Status Description    2021  9:45 PM Hospitalist (on-call MD unless specified) Completed           Active and Resolved Hospital Problems:  Active Hospital Problems    Diagnosis POA   • Cytokine release syndrome, grade 1 [D89.831] Unknown   • Dyspnea on exertion [R06.00] Yes   • COVID-19 [U07.1] Yes      Resolved Hospital Problems   No resolved problems to display.       Hospital Course     Hospital Course:  Ash Miller is a 50 y.o. male with no significant past medical history presented to the ED with complaints of shortness of breath.  Patient tested positive for COVID-19 8 days prior to presentation in the ED.  Evaluation in ED significant raise to be tachypneic with O2 saturation of 89% on exertion.  Lab evaluation was unremarkable and chest x-ray proved compared to previous imaging.  ED nurse practitioner did not feel comfortable discharging patient home, hospitalist service contacted for further evaluation and management. Patient has remained on 2L of oxygen with no increase in his requirement. Patient treated with steroids, duonebs, pulmicort and 3 dfays of remdesivir. Patient is feeling better. Remains short of air with activity and has occasional cough. Patient to be discharged home today with home oxygen, steroids and inhalers. Patient to follow up with pcp I 1 week. Patient to remain off of work until off of oxygen and until follow up with pcp.         Day of Discharge     Vital Signs:  Temp:  [97.9 °F (36.6 °C)-98.8 °F (37.1 °C)] 98.6 °F (37 °C)  Heart Rate:  [42-70] 60  Resp:  [16-18] 16  BP: (115-133)/(51-69) 126/69  Flow (L/min):  [2] 2  Physical Exam:   Gen: No acute distress, sitting in bedside chair   HEENT:  MMM, Atraumatic  Neck: Supple,   Resp: Good aeration, no wheezes/rales/rhonchi   Card: RRR, No murmurs   Abd: Soft, Nontender, Nondistended, + bowel sounds  Ext: No cyanosis, No clubbing  Neuro: CN II-XII grossly intact, No focal deficits appreciated  Psych: AAO x 3, Normal mood, Normal affect      Discharge Details        Discharge Medications      New Medications      Instructions Start Date   albuterol sulfate  (90 Base) MCG/ACT inhaler  Commonly known as: PROVENTIL HFA;VENTOLIN HFA;PROAIR HFA   2 puffs, Inhalation, Every 4 Hours PRN      budesonide-formoterol 160-4.5 MCG/ACT inhaler  Commonly known as: SYMBICORT   2 puffs, Inhalation, 2 Times Daily - RT      dexamethasone 6 MG tablet  Commonly known as: DECADRON   6 mg, Oral, Daily   Start Date: September 13, 2021        Continue These Medications      Instructions Start Date   ACETAMINOPHEN PM PO   1 tablet, Oral, Nightly      brompheniramine-pseudoephedrine-DM 30-2-10 MG/5ML syrup   5 mL, Oral      esomeprazole 10 MG packet  Commonly known as: NexIUM   10 mg, Oral, Daily         Stop These Medications    azithromycin 250 MG tablet  Commonly known as: Zithromax Z-Ronny     predniSONE 20 MG tablet  Commonly known as: DELTASONE            No Known Allergies    Discharge Disposition:  Home or Self Care    Diet:  Hospital:  Diet Order   Procedures   • Diet Regular       Discharge Activity:       CODE STATUS:  Code Status and Medical Interventions:   Ordered at: 09/08/21 2201     Code Status:    CPR     Medical Interventions (Level of Support Prior to Arrest):    Full         No future appointments.    Additional Instructions for the Follow-ups that You Need to Schedule     Discharge Follow-up with PCP   As directed       Currently Documented PCP:    Rocio Fernandez APRN    PCP Phone Number:    [phone]     Follow Up Details: in 1 week               Pertinent  and/or Most Recent Results     PROCEDURES:   None     LAB RESULTS:      Lab 09/12/21  2913  09/11/21  0630 09/10/21  0534 09/09/21  1114 09/09/21  0213 09/08/21  1826   WBC 8.52 7.74 6.31 7.92  --  3.04*   HEMOGLOBIN 11.9* 12.7* 13.3 13.3  --  14.1   HEMATOCRIT 34.8* 37.7 39.4 38.4  --  40.6   PLATELETS 294 274 245 219  --  199   NEUTROS ABS 5.87 5.35 4.36 6.04  --  2.34   IMMATURE GRANS (ABS) 0.16* 0.08* 0.05 0.03  --  0.02   LYMPHS ABS 1.73 1.60 1.28 1.34  --  0.59*   MONOS ABS 0.74 0.70 0.61 0.50  --  0.09*   EOS ABS 0.00 0.00 0.00 0.00  --  0.00   MCV 90.2 90.6 91.2 89.7  --  90.0   CRP  --   --  8.99* 9.69*  --   --    PROCALCITONIN  --   --   --   --  0.12  --    LDH  --   --   --   --  513*  --          Lab 09/12/21  0507 09/11/21  0630 09/10/21  0534 09/09/21 1114 09/08/21  1826   SODIUM 140 141 138 137 134*   POTASSIUM 3.6 4.0 4.5 3.8 3.9   CHLORIDE 105 106 103 102 100   CO2 22.5 23.2 25.5 22.2 22.1   ANION GAP 12.5 11.8 9.5 12.8 11.9   BUN 18 16 16 14 17   CREATININE 0.79 0.73* 0.91 1.04 1.19   GLUCOSE 131* 126* 128* 123* 182*   CALCIUM 8.7 8.6 8.9 9.1 9.0   MAGNESIUM 2.0 2.0 2.0 1.8  --          Lab 09/12/21  0507 09/11/21 0630 09/10/21  0534 09/09/21  1114 09/08/21  1826   TOTAL PROTEIN 5.9* 6.1 6.7 7.0 7.3   ALBUMIN 3.10* 3.30* 3.60 3.60 3.90   GLOBULIN 2.8 2.8 3.1 3.4 3.4   ALT (SGPT) 35 38 40 43* 48*   AST (SGOT) 32 41* 46* 58* 58*   BILIRUBIN 0.3 0.3 0.3 0.3 0.3   ALK PHOS 44 46 52 55 55         Lab 09/08/21  1826 09/07/21  0639   PROBNP 128.0 183.3   TROPONIN T <0.010 <0.010             Lab 09/10/21  0534   FERRITIN 1,286.00*         Brief Urine Lab Results     None        Microbiology Results (last 10 days)     Procedure Component Value - Date/Time    Blood Culture - Blood, Hand, Right [[id number]] Collected: 09/09/21 1114    Lab Status: Preliminary result Specimen: Blood from Hand, Right Updated: 09/12/21 1200     Blood Culture No growth at 3 days    Blood Culture - Blood, Arm, Left [[id number]] Collected: 09/09/21 1113    Lab Status: Preliminary result Specimen: Blood from Arm, Left  Updated: 09/12/21 1200     Blood Culture No growth at 3 days    Legionella Antigen, Urine - Urine, Urine, Clean Catch [[id number]]  (Normal) Collected: 09/09/21 0013    Lab Status: Final result Specimen: Urine, Clean Catch Updated: 09/09/21 0913     LEGIONELLA ANTIGEN, URINE Negative    S. Pneumo Ag Urine or CSF - Urine, Urine, Clean Catch [[id number]]  (Normal) Collected: 09/09/21 0013    Lab Status: Final result Specimen: Urine, Clean Catch Updated: 09/09/21 0913     Strep Pneumo Ag Negative                       Results for orders placed in visit on 08/03/21    Adult Transthoracic Echo Complete W/ Cont if Necessary Per Protocol    Interpretation Summary  1.  Normal left ventricular systolic function.  2.  No significant valve abnormalities noted.      Labs Pending at Discharge:  Pending Labs     Order Current Status    Blood Culture - Blood, Arm, Left Preliminary result    Blood Culture - Blood, Hand, Right Preliminary result            Time spent on Discharge including face to face service:  More than 35  minutes    Electronically signed by Jaskaran Holt DO, 09/12/21, 2:34 PM EDT.

## 2021-09-12 NOTE — OUTREACH NOTE
Prep Survey      Responses   [facility] facility patient discharged from?  [facility]   Is LACE score < 7 ?  No   Emergency Room discharge w/ pulse ox?  No   Eligibility  TCM   Hospital  [facility]   Date of Admission  09/08/21   Date of Discharge  09/12/21   Discharge Disposition  Home or Self Care   Discharge diagnosis  [facility]ER[facility], COVID-19 PNA, multifocal PNA   Does the patient have one of the following disease processes/diagnoses(primary or secondary)?  COVID-19   Does the patient have Home health ordered?  No   Is there a DME ordered?  Yes   What DME was ordered?  O2 from [place]/[facility]   Prep survey completed?  Yes          Lay Pollard RN

## 2021-09-13 ENCOUNTER — READMISSION MANAGEMENT (OUTPATIENT)
Dept: CALL CENTER | Facility: HOSPITAL | Age: 51
End: 2021-09-13

## 2021-09-13 NOTE — OUTREACH NOTE
COVID-19 Week 1 Survey      Responses   [facility] [place] patient discharged from?  [facility]   Does the patient have one of the following disease processes/diagnoses(primary or secondary)?  COVID-19   COVID-19 underlying condition?  None   Call Number  Call 1   Week 1 Call successful?  No   Discharge diagnosis  FERRERA, COVID-19 PNA, multifocal PNA          Traci Reich RN

## 2021-09-14 ENCOUNTER — READMISSION MANAGEMENT (OUTPATIENT)
Dept: CALL CENTER | Facility: HOSPITAL | Age: 51
End: 2021-09-14

## 2021-09-14 LAB
BACTERIA SPEC AEROBE CULT: NORMAL
BACTERIA SPEC AEROBE CULT: NORMAL

## 2021-09-14 NOTE — OUTREACH NOTE
COVID-19 Week 1 Survey      Responses   [facility] patient discharged from?  [facility]   Does the patient have one of the following disease processes/diagnoses(primary or secondary)?  COVID-19   COVID-19 underlying condition?  None   Call Number  Call 2   Week 1 Call successful?  No   Discharge diagnosis  FERRERA, COVID-19 PNA, multifocal PNA          Traci Reich RN

## 2021-09-15 ENCOUNTER — READMISSION MANAGEMENT (OUTPATIENT)
Dept: CALL CENTER | Facility: HOSPITAL | Age: 51
End: 2021-09-15

## 2021-09-15 NOTE — OUTREACH NOTE
COVID-19 Week 1 Survey      Responses   [facility] patient discharged from?  [facility]   Does the patient have one of the following disease processes/diagnoses(primary or secondary)?  COVID-19   COVID-19 underlying condition?  None   Call Number  Call 3   Week 1 Call successful?  No   Discharge diagnosis  FERRERA, COVID-19 PNA, multifocal PNA          Radha Euceda RN

## 2021-09-19 ENCOUNTER — READMISSION MANAGEMENT (OUTPATIENT)
Dept: CALL CENTER | Facility: HOSPITAL | Age: 51
End: 2021-09-19

## 2021-09-19 NOTE — OUTREACH NOTE
COVID-19 Week 1 Survey      Responses   [facility] patient discharged from?  [facility]   Does the patient have one of the following disease processes/diagnoses(primary or secondary)?  COVID-19   COVID-19 underlying condition?  None   Call Number  Call 4   Week 1 Call successful?  No [PATIENT'S AND EMERGENCY CONTACTS NUMBERS ANSWERED BY RECORDINGS. NO MESSAGES LEFT]   Discharge diagnosis  FERRERA, COVID-19 PNA, multifocal PNA          Peri Boswell RN

## 2021-09-22 ENCOUNTER — READMISSION MANAGEMENT (OUTPATIENT)
Dept: CALL CENTER | Facility: HOSPITAL | Age: 51
End: 2021-09-22

## 2021-09-22 NOTE — OUTREACH NOTE
"COVID-19 Week 2 Survey      Responses   [facility] patient discharged from?  [facility]   Does the patient have one of the following disease processes/diagnoses(primary or secondary)?  COVID-19   COVID-19 underlying condition?  None   Call Number  Call 1   COVID-19 Week 2: Call 1 attempt successful?  Yes   Call start time  1448   Call end time  1451   Discharge diagnosis  F[facility]ERA, COVID-19 PNA, multifocal PNA   Meds reviewed with patient/caregiver?  Yes   Is the patient having any side effects they believe may be caused by any medication additions or changes?  No   Does the patient have all medications ordered at discharge?  Yes   Is the patient taking all medications as directed (includes completed medication regime)?  Yes   Does the patient have a primary care provider?   Yes   Comments regarding PCP  9/21/21-PCP rescheduled it to 10/4/21   Does the patient have an appointment with their PCP or specialist within 7 days of discharge?  Greater than 7 days   What is preventing the patient from scheduling follow up appointments within 7 days of discharge?  -- [PCP changed appt]   Nursing Interventions  Verified appointment date/time/provider   Has the patient kept scheduled appointments due by today?  N/A   What DME was ordered?  O2 from [place]/[facility],  Pulse ox   Has all DME been delivered?  Yes   DME comments  Pt is not wearing O2   Psychosocial issues?  No   Did the patient receive a copy of their discharge instructions?  Yes   Did the patient receive a copy of COVID-19 specific instructions?  Yes   Nursing interventions  Reviewed instructions with patient   What is the patient's perception of their health status since discharge?  Improving   Does the patient have any of the following symptoms?  Shortness of breath   Nursing Interventions  Nurse provided patient education   Pulse Ox monitoring  Intermittent   Pulse Ox device source  Hospital   O2 Sat comments  Pt states, \"I have to get another one of those thingy " "because it broke\"   Is the patient/caregiver able to teach back steps to recovery at home?  Rest and rebuild strength, gradually increase activity, Eat a well-balance diet   If the patient is a current smoker, are they able to teach back resources for cessation?  Not a smoker   Is the patient/caregiver able to teach back the hierarchy of who to call/visit for symptoms/problems? PCP, Specialist, Home health nurse, Urgent Care, ED, 911  Yes   COVID-19 call completed?  Yes   Revoked  No further contact(revokes)-requires comment   Is the patient interested in additional calls from an ambulatory ?  NOTE:  applies to high risk patients requiring additional follow-up.  No   Graduated/Revoked comments  Pt reports, \"it's (another call) not necessary\"          Karen Fair RN  "

## 2021-10-06 ENCOUNTER — TRANSCRIBE ORDERS (OUTPATIENT)
Dept: ADMINISTRATIVE | Facility: HOSPITAL | Age: 51
End: 2021-10-06

## 2021-10-06 DIAGNOSIS — R60.9 SWELLING: ICD-10-CM

## 2021-10-06 DIAGNOSIS — M79.604 RIGHT LEG PAIN: Primary | ICD-10-CM

## 2021-10-12 ENCOUNTER — HOSPITAL ENCOUNTER (OUTPATIENT)
Dept: CARDIOLOGY | Facility: HOSPITAL | Age: 51
Discharge: HOME | End: 2021-10-12
Admitting: NURSE PRACTITIONER

## 2021-10-12 DIAGNOSIS — R60.9 SWELLING: ICD-10-CM

## 2021-10-12 DIAGNOSIS — M79.604 RIGHT LEG PAIN: ICD-10-CM

## 2021-10-12 LAB
BH CV LOWER VASCULAR LEFT COMMON FEMORAL AUGMENT: NORMAL
BH CV LOWER VASCULAR LEFT COMMON FEMORAL COMPETENT: NORMAL
BH CV LOWER VASCULAR LEFT COMMON FEMORAL COMPRESS: NORMAL
BH CV LOWER VASCULAR LEFT COMMON FEMORAL PHASIC: NORMAL
BH CV LOWER VASCULAR LEFT COMMON FEMORAL SPONT: NORMAL
BH CV LOWER VASCULAR RIGHT COMMON FEMORAL AUGMENT: NORMAL
BH CV LOWER VASCULAR RIGHT COMMON FEMORAL COMPETENT: NORMAL
BH CV LOWER VASCULAR RIGHT COMMON FEMORAL COMPRESS: NORMAL
BH CV LOWER VASCULAR RIGHT COMMON FEMORAL PHASIC: NORMAL
BH CV LOWER VASCULAR RIGHT COMMON FEMORAL SPONT: NORMAL
BH CV LOWER VASCULAR RIGHT DISTAL FEMORAL COMPRESS: NORMAL
BH CV LOWER VASCULAR RIGHT GREATER SAPH AK COMPRESS: NORMAL
BH CV LOWER VASCULAR RIGHT GREATER SAPH BK COMPRESS: NORMAL
BH CV LOWER VASCULAR RIGHT MID FEMORAL AUGMENT: NORMAL
BH CV LOWER VASCULAR RIGHT MID FEMORAL COMPETENT: NORMAL
BH CV LOWER VASCULAR RIGHT MID FEMORAL COMPRESS: NORMAL
BH CV LOWER VASCULAR RIGHT MID FEMORAL PHASIC: NORMAL
BH CV LOWER VASCULAR RIGHT MID FEMORAL SPONT: NORMAL
BH CV LOWER VASCULAR RIGHT PERONEAL COMPRESS: NORMAL
BH CV LOWER VASCULAR RIGHT POPLITEAL AUGMENT: NORMAL
BH CV LOWER VASCULAR RIGHT POPLITEAL COMPETENT: NORMAL
BH CV LOWER VASCULAR RIGHT POPLITEAL COMPRESS: NORMAL
BH CV LOWER VASCULAR RIGHT POPLITEAL PHASIC: NORMAL
BH CV LOWER VASCULAR RIGHT POPLITEAL SPONT: NORMAL
BH CV LOWER VASCULAR RIGHT POSTERIOR TIBIAL COMPRESS: NORMAL
BH CV LOWER VASCULAR RIGHT PROXIMAL FEMORAL COMPRESS: NORMAL
BH CV LOWER VASCULAR RIGHT SAPHENOFEMORAL JUNCTION COMPRESS: NORMAL
MAXIMAL PREDICTED HEART RATE: 169 BPM
STRESS TARGET HR: 144 BPM

## 2021-10-12 PROCEDURE — 93971 EXTREMITY STUDY: CPT | Performed by: SURGERY

## 2021-10-12 PROCEDURE — 93971 EXTREMITY STUDY: CPT

## 2021-11-26 PROBLEM — M75.41 SUBACROMIAL IMPINGEMENT OF RIGHT SHOULDER: Status: ACTIVE | Noted: 2018-08-02

## 2021-11-26 PROBLEM — M75.121 COMPLETE TEAR OF RIGHT ROTATOR CUFF: Status: ACTIVE | Noted: 2018-08-02

## 2021-11-26 PROBLEM — M75.20 BICIPITAL TENDINITIS: Status: ACTIVE | Noted: 2018-08-02

## 2021-11-26 PROBLEM — N32.9 BLADDER PROBLEM: Status: ACTIVE | Noted: 2021-11-26

## 2021-11-26 PROBLEM — R25.2 MUSCLE CRAMPS: Status: ACTIVE | Noted: 2021-11-26

## 2021-11-26 PROBLEM — N20.0 RENAL CALCULUS: Status: ACTIVE | Noted: 2021-11-26

## 2021-12-08 ENCOUNTER — OFFICE VISIT (OUTPATIENT)
Dept: SURGERY | Facility: CLINIC | Age: 51
End: 2021-12-08

## 2021-12-08 VITALS — BODY MASS INDEX: 31.98 KG/M2 | HEIGHT: 68 IN | WEIGHT: 211 LBS

## 2021-12-08 DIAGNOSIS — L72.0 EPIDERMAL INCLUSION CYST: Primary | ICD-10-CM

## 2021-12-08 PROCEDURE — 99203 OFFICE O/P NEW LOW 30 MIN: CPT | Performed by: SURGERY

## 2021-12-08 NOTE — PROGRESS NOTES
General Surgery/Colorectal Surgery Note    Patient Name:  Ash Miller  YOB: 1970  [id number]    Referring Provider: Provider, No Known      Patient Care Team:  Rocio Fernandez APRN as PCP - General (Nurse Practitioner)  Jono Oropeza MD as Consulting Physician (General Surgery)    Chief complaint epidermal inclusion cyst    Subjective .     History of present illness:    Longtime history of mid back epidermal inclusion cyst with recent increase in size with pain.  Incision and drainage performed at outside facility and started on antibiotics last week.  Improvement since that time.  No fever.  No exacerbating or alleviating factors.      History:  Past Medical History:   Diagnosis Date   • Kidney stone        Past Surgical History:   Procedure Laterality Date   • ELBOW ARTHROPLASTY      right   • ROTATOR CUFF REPAIR      right       History reviewed. No pertinent family history.    Social History     Tobacco Use   • Smoking status: Never Smoker   • Smokeless tobacco: Never Used   Vaping Use   • Vaping Use: Never used   Substance Use Topics   • Alcohol use: Not Currently   • Drug use: Never       Review of Systems  All systems were reviewed and negative except for:   Review of Systems   Constitutional: Negative for chills, fever and unexpected weight loss.   HENT: Negative for congestion, nosebleeds and voice change.    Eyes: Negative for blurred vision, double vision and discharge.   Respiratory: Negative for apnea, chest tightness and shortness of breath.    Cardiovascular: Negative for chest pain and leg swelling.   Gastrointestinal: No nausea vomiting diarrhea   Endocrine: Negative for cold intolerance and heat intolerance.   Genitourinary: Negative for dysuria, hematuria and urgency.   Musculoskeletal: Negative for back pain, joint swelling and neck pain.   Skin: Negative for color change and dry skin.  See HPI  Neurological: Negative for dizziness and confusion.   Hematological:  Negative for adenopathy.   Psychiatric/Behavioral: Negative for agitation and behavioral problems.     MEDS:  Prior to Admission medications    Medication Sig Start Date End Date Taking? Authorizing Provider   cephalexin (KEFLEX) 500 MG capsule Take 1 capsule by mouth 3 (Three) Times a Day for 10 days. 12/6/21 12/17/21 Yes Dilan Quinonez PA-C   diphenhydrAMINE-APAP, sleep, (ACETAMINOPHEN PM PO) Take 1 tablet by mouth Every Night.   Yes Feng Espinosa MD   esomeprazole (NexIUM) 10 MG packet Take 10 mg by mouth Daily.   Yes Feng Espinosa MD   sulfamethoxazole-trimethoprim (BACTRIM DS,SEPTRA DS) 800-160 MG per tablet Take 1 tablet by mouth 2 (Two) Times a Day for 10 days. 12/6/21 12/17/21 Yes Dilan Quinonez PA-C   albuterol sulfate  (90 Base) MCG/ACT inhaler Inhale 2 puffs Every 4 (Four) Hours As Needed for Wheezing. 9/12/21   Jaskaran Holt DO   brompheniramine-pseudoephedrine-DM 30-2-10 MG/5ML syrup Take 5 mL by mouth. 8/31/21   ProviderFeng MD   budesonide-formoterol (SYMBICORT) 160-4.5 MCG/ACT inhaler Inhale 2 puffs 2 (Two) Times a Day. 9/12/21   Jaskaran Holt DO        Allergies:  Patient has no known allergies.    Objective     Vital Signs        Physical Exam:     General Appearance:    Alert, cooperative, in no acute distress   Head:    Normocephalic, without obvious abnormality, atraumatic   Eyes:          Conjunctivae and sclerae normal, no icterus,     Ears:    Ears appear intact with no abnormalities noted   Throat:   No oral lesions, no thrush, oral mucosa moist   Neck:   No adenopathy, supple, trachea midline, no thyromegaly   Back:     No kyphosis present, no scoliosis present, no skin lesions,      erythema or scars, no tenderness to percussion or                   palpation,   range of motion normal   Lungs:     Clear to auscultation,respirations regular, even and                  unlabored    Heart:    Regular rhythm and normal rate, normal S1 and S2, no    "         murmur, no gallop, no rub, no click   Chest Wall:    No abnormalities observed   Abdomen:     Normal bowel sounds, no masses, no organomegaly, soft        non-tender, non-distended, no guarding, no rebound                tenderness   Rectal:        Extremities:   Moves all extremities well, no edema, no cyanosis, no             redness   Pulses:   Pulses palpable and equal bilaterally   Skin:   No bleeding, bruising or rash, I&D sided epidermal inclusion cyst with resolving inflammation, no evidence of ongoing infection   Lymph nodes:   No palpable adenopathy   Neurologic:   A/o x 4 with no deficits       Results Review:   {Results Review:[id number]::\"I reviewed the patient's new clinical results.\"    LABS/IMAGING:  Results for orders placed or performed during the hospital encounter of 10/12/21   Duplex venous lower extremity right CAR   Result Value Ref Range    Target HR (85%) 144 bpm    Max. Pred. HR (100%) 169 bpm    Right Common Femoral Spont Y     Right Common Femoral Phasic Y     Right Common Femoral Augment Y     Right Common Femoral Competent Y     Right Common Femoral Compress C     Right Saphenofemoral Junction Compress C     Right Proximal Femoral Compress C     Right Mid Femoral Spont Y     Right Mid Femoral Phasic Y     Right Mid Femoral Augment Y     Right Mid Femoral Competent Y     Right Mid Femoral Compress C     Right Distal Femoral Compress C     Right Popliteal Spont Y     Right Popliteal Phasic Y     Right Popliteal Augment Y     Right Popliteal Competent Y     Right Popliteal Compress C     Right Posterior Tibial Compress C     Right Peroneal Compress C     Right Greater Saph AK Compress C     Right Greater Saph BK Compress C     Left Common Femoral Spont Y     Left Common Femoral Phasic Y     Left Common Femoral Augment Y     Left Common Femoral Competent Y     Left Common Femoral Compress C         Result Review :     Assessment/Plan     Mid back epidermal inclusion cyst status post " incision and drainage at outside facility, no evidence of ongoing infection    I reassured the patient there is no evidence of ongoing infection.  He may stop his antibiotics.  I recommended following up in 3 to 4 weeks with excision of the cyst in the office after inflammation has resolved.  All questions answered.  He agrees with the plan.  Thank for the consult.         This document has been electronically signed by Jono Oropeza MD  December 8, 2021 13:59 EST

## 2021-12-28 ENCOUNTER — OFFICE VISIT (OUTPATIENT)
Dept: SURGERY | Facility: CLINIC | Age: 51
End: 2021-12-28

## 2021-12-28 ENCOUNTER — PREP FOR SURGERY (OUTPATIENT)
Dept: OTHER | Facility: HOSPITAL | Age: 51
End: 2021-12-28

## 2021-12-28 VITALS — BODY MASS INDEX: 32.74 KG/M2 | HEIGHT: 68 IN | WEIGHT: 216 LBS

## 2021-12-28 DIAGNOSIS — L72.3 SEBACEOUS CYST: Primary | ICD-10-CM

## 2021-12-28 DIAGNOSIS — Z12.11 SCREENING FOR COLORECTAL CANCER: Primary | ICD-10-CM

## 2021-12-28 DIAGNOSIS — Z12.12 SCREENING FOR COLORECTAL CANCER: Primary | ICD-10-CM

## 2021-12-28 PROCEDURE — 11402 EXC TR-EXT B9+MARG 1.1-2 CM: CPT | Performed by: SURGERY

## 2021-12-28 PROCEDURE — 88304 TISSUE EXAM BY PATHOLOGIST: CPT | Performed by: SURGERY

## 2021-12-28 RX ORDER — SODIUM CHLORIDE 0.9 % (FLUSH) 0.9 %
3 SYRINGE (ML) INJECTION EVERY 12 HOURS SCHEDULED
Status: CANCELLED | OUTPATIENT
Start: 2021-12-28

## 2021-12-28 RX ORDER — ASPIRIN 81 MG/1
81 TABLET ORAL DAILY
COMMUNITY
End: 2022-02-08

## 2021-12-28 RX ORDER — SODIUM CHLORIDE 0.9 % (FLUSH) 0.9 %
10 SYRINGE (ML) INJECTION AS NEEDED
Status: CANCELLED | OUTPATIENT
Start: 2021-12-28

## 2021-12-28 RX ORDER — SODIUM, POTASSIUM,MAG SULFATES 17.5-3.13G
SOLUTION, RECONSTITUTED, ORAL ORAL
Qty: 354 ML | Refills: 0 | Status: ON HOLD | OUTPATIENT
Start: 2021-12-28 | End: 2022-02-10

## 2021-12-28 NOTE — PROGRESS NOTES
General Surgery/Colorectal Surgery Note    Patient Name:  Ash Miller  YOB: 1970  [id number]    Referring Provider: Jono Oropeza, *      Patient Care Team:  Rocio Fernandez APRN as PCP - General (Nurse Practitioner)  Jono Oropeza MD as Consulting Physician (General Surgery)    Chief complaint back cyst    Subjective .     History of present illness:    History of epidermal inclusion cyst with several episodes of infection comes in for removal of the cyst in the office today.  No changes since last seen.      History:  Past Medical History:   Diagnosis Date   • Kidney stone        Past Surgical History:   Procedure Laterality Date   • ELBOW ARTHROPLASTY      right   • ROTATOR CUFF REPAIR      right       History reviewed. No pertinent family history.    Social History     Tobacco Use   • Smoking status: Never Smoker   • Smokeless tobacco: Never Used   Vaping Use   • Vaping Use: Never used   Substance Use Topics   • Alcohol use: Not Currently   • Drug use: Never       Review of Systems  All systems were reviewed and negative except for:   Review of Systems   Constitutional: Negative for chills, fever and unexpected weight loss.   HENT: Negative for congestion, nosebleeds and voice change.    Eyes: Negative for blurred vision, double vision and discharge.   Respiratory: Negative for apnea, chest tightness and shortness of breath.    Cardiovascular: Negative for chest pain and leg swelling.   Gastrointestinal:        See HPI   Endocrine: Negative for cold intolerance and heat intolerance.   Genitourinary: Negative for dysuria, hematuria and urgency.   Musculoskeletal: Negative for back pain, joint swelling and neck pain.   Skin: Negative for color change and dry skin.   Neurological: Negative for dizziness and confusion.   Hematological: Negative for adenopathy.   Psychiatric/Behavioral: Negative for agitation and behavioral problems.     MEDS:  Prior to Admission medications     Medication Sig Start Date End Date Taking? Authorizing Provider   aspirin 81 MG EC tablet Take 81 mg by mouth Daily.   Yes Feng Espinosa MD   esomeprazole (nexIUM) 40 MG capsule Take 1 capsule by mouth daily. 12/21/21  Yes    albuterol sulfate  (90 Base) MCG/ACT inhaler Inhale 2 puffs Every 4 (Four) Hours As Needed for Wheezing. 9/12/21   Jaskaran Holt DO   brompheniramine-pseudoephedrine-DM 30-2-10 MG/5ML syrup Take 5 mL by mouth. 8/31/21   Feng Espinosa MD   budesonide-formoterol (SYMBICORT) 160-4.5 MCG/ACT inhaler Inhale 2 puffs 2 (Two) Times a Day. 9/12/21   Jaskaran Holt DO   diphenhydrAMINE-APAP, sleep, (ACETAMINOPHEN PM PO) Take 1 tablet by mouth Every Night.    Feng Espinosa MD   esomeprazole (NexIUM) 10 MG packet Take 10 mg by mouth Daily.    ProviderFeng MD   esomeprazole (NexIUM) 10 MG packet Take 10 mg ( 1 packet)  by mouth every morning before breakfast. 12/21/21      sodium-potassium-magnesium sulfates (SUPREP) 17.5-3.13-1.6 GM/177ML solution oral solution Take as directed 12/28/21   Jono Oropeza MD        Allergies:  Patient has no known allergies.    Objective     Vital Signs        Physical Exam:     General Appearance:    Alert, cooperative, in no acute distress   Head:    Normocephalic, without obvious abnormality, atraumatic   Eyes:          Conjunctivae and sclerae normal, no icterus,     Ears:    Ears appear intact with no abnormalities noted   Throat:   No oral lesions, no thrush, oral mucosa moist   Neck:   No adenopathy, supple, trachea midline, no thyromegaly   Back:     No kyphosis present, no scoliosis present, no skin lesions,      erythema or scars, no tenderness to percussion or                   palpation,   range of motion normal   Lungs:     Clear to auscultation,respirations regular, even and                  unlabored    Heart:    Regular rhythm and normal rate, normal S1 and S2, no            murmur, no gallop, no rub, no  "click   Chest Wall:    No abnormalities observed   Abdomen:     Normal bowel sounds, no masses, no organomegaly, soft        non-tender, non-distended, no guarding, no rebound                tenderness   Rectal:        Extremities:   Moves all extremities well, no edema, no cyanosis, no             redness   Pulses:   Pulses palpable and equal bilaterally   Skin:   No bleeding, bruising or rash, noninflamed small lower back epidermal inclusion cyst   Lymph nodes:   No palpable adenopathy   Neurologic:   A/o x 4 with no deficits       Results Review:   {Results Review:55067::\"I reviewed the patient's new clinical results.\"    LABS/IMAGING:  Results for orders placed or performed during the hospital encounter of 10/12/21   Duplex venous lower extremity right CAR   Result Value Ref Range    Target HR (85%) 144 bpm    Max. Pred. HR (100%) 169 bpm    Right Common Femoral Spont Y     Right Common Femoral Phasic Y     Right Common Femoral Augment Y     Right Common Femoral Competent Y     Right Common Femoral Compress C     Right Saphenofemoral Junction Compress C     Right Proximal Femoral Compress C     Right Mid Femoral Spont Y     Right Mid Femoral Phasic Y     Right Mid Femoral Augment Y     Right Mid Femoral Competent Y     Right Mid Femoral Compress C     Right Distal Femoral Compress C     Right Popliteal Spont Y     Right Popliteal Phasic Y     Right Popliteal Augment Y     Right Popliteal Competent Y     Right Popliteal Compress C     Right Posterior Tibial Compress C     Right Peroneal Compress C     Right Greater Saph AK Compress C     Right Greater Saph BK Compress C     Left Common Femoral Spont Y     Left Common Femoral Phasic Y     Left Common Femoral Augment Y     Left Common Femoral Competent Y     Left Common Femoral Compress C         Result Review :     Assessment/Plan     Lower back epidermal inclusion cyst without evidence of infection    I offered excision in the office today.  I explained the " procedure and recovery.  Benefits and alternatives discussed.  Risk of procedure including risk of anesthesia, bleeding, infection, recurrence, wound problems were discussed.  All questions answered.  Consent obtained.  Timeout taken   procedure note  Preoperative and postoperative diagnosis lower back epidermal inclusion cyst, Surgeon Johnna, procedure excision of 2 x 2 x 2 cm lower back epidermal inclusion cyst, anesthesia local, estimated blood loss 5 cc, specimen cyst, complications none    He was instructed to follow-up in 3 weeks.  May shower tomorrow.  Call for concern for infection.  All questions answered.  He agrees with the plan.  Thank you for the consult.           This document has been electronically signed by Jono Oropeza MD  December 28, 2021 16:28 EST

## 2021-12-30 LAB
CYTO UR: NORMAL
LAB AP CASE REPORT: NORMAL
LAB AP CLINICAL INFORMATION: NORMAL
PATH REPORT.FINAL DX SPEC: NORMAL
PATH REPORT.GROSS SPEC: NORMAL

## 2022-01-18 ENCOUNTER — OFFICE VISIT (OUTPATIENT)
Dept: SURGERY | Facility: CLINIC | Age: 52
End: 2022-01-18

## 2022-01-18 VITALS — WEIGHT: 212 LBS | HEIGHT: 68 IN | RESPIRATION RATE: 14 BRPM | BODY MASS INDEX: 32.13 KG/M2

## 2022-01-18 DIAGNOSIS — G47.30 SLEEP APNEA, UNSPECIFIED TYPE: Primary | ICD-10-CM

## 2022-01-18 PROCEDURE — 99211 OFF/OP EST MAY X REQ PHY/QHP: CPT | Performed by: SURGERY

## 2022-01-18 NOTE — PROGRESS NOTES
General Surgery/Colorectal Surgery Note    Patient Name:  Ash Miller  YOB: 1970  [id number]    Referring Provider: No ref. provider found      Patient Care Team:  Rocio Fernandez APRN as PCP - General (Nurse Practitioner)  Jono Oropeza MD as Consulting Physician (General Surgery)    Chief complaint follow-up    Subjective .     History of present illness:    Status post excision epidermal inclusion cyst December 2021.  Pathology with epidermal inclusion cyst.  No fever, erythema, drainage.    He comes in for follow-up.  He is also wanting further evaluation and treatment of obstructive sleep apnea.      History:  Past Medical History:   Diagnosis Date   • Kidney stone        Past Surgical History:   Procedure Laterality Date   • ELBOW ARTHROPLASTY      right   • ROTATOR CUFF REPAIR      right       History reviewed. No pertinent family history.    Social History     Tobacco Use   • Smoking status: Never Smoker   • Smokeless tobacco: Never Used   Vaping Use   • Vaping Use: Never used   Substance Use Topics   • Alcohol use: Not Currently   • Drug use: Never       Review of Systems  All systems were reviewed and negative except for:   Review of Systems   Constitutional: Negative for chills, fever and unexpected weight loss.   HENT: Negative for congestion, nosebleeds and voice change.    Eyes: Negative for blurred vision, double vision and discharge.   Respiratory: Negative for apnea, chest tightness and shortness of breath.    Cardiovascular: Negative for chest pain and leg swelling.   Gastrointestinal:        See HPI   Endocrine: Negative for cold intolerance and heat intolerance.   Genitourinary: Negative for dysuria, hematuria and urgency.   Musculoskeletal: Negative for back pain, joint swelling and neck pain.   Skin: Negative for color change and dry skin.   Neurological: Negative for dizziness and confusion.   Hematological: Negative for adenopathy.   Psychiatric/Behavioral:  Negative for agitation and behavioral problems.     MEDS:  Prior to Admission medications    Medication Sig Start Date End Date Taking? Authorizing Provider   aspirin 81 MG EC tablet Take 81 mg by mouth Daily.   Yes Feng Espinosa MD   esomeprazole (nexIUM) 40 MG capsule Take 1 capsule by mouth daily. 12/21/21  Yes    albuterol sulfate  (90 Base) MCG/ACT inhaler Inhale 2 puffs Every 4 (Four) Hours As Needed for Wheezing. 9/12/21   Jaskaran Holt DO   brompheniramine-pseudoephedrine-DM 30-2-10 MG/5ML syrup Take 5 mL by mouth. 8/31/21   Feng Espinosa MD   budesonide-formoterol (SYMBICORT) 160-4.5 MCG/ACT inhaler Inhale 2 puffs 2 (Two) Times a Day. 9/12/21   Jaskaran Holt DO   diphenhydrAMINE-APAP, sleep, (ACETAMINOPHEN PM PO) Take 1 tablet by mouth Every Night.    Feng Espinosa MD   esomeprazole (NexIUM) 10 MG packet Take 10 mg by mouth Daily.    Feng Espinosa MD   esomeprazole (NexIUM) 10 MG packet Take 10 mg ( 1 packet)  by mouth every morning before breakfast. 12/21/21      sodium-potassium-magnesium sulfates (SUPREP) 17.5-3.13-1.6 GM/177ML solution oral solution Take as directed 12/28/21   Jono Oropeza MD        Allergies:  Patient has no known allergies.    Objective     Vital Signs   Resp:  [14] 14    Physical Exam:     General Appearance:    Alert, cooperative, in no acute distress   Head:    Normocephalic, without obvious abnormality, atraumatic   Eyes:          Conjunctivae and sclerae normal, no icterus,     Ears:    Ears appear intact with no abnormalities noted   Throat:   No oral lesions, no thrush, oral mucosa moist   Neck:   No adenopathy, supple, trachea midline, no thyromegaly   Back:     No kyphosis present, no scoliosis present, no skin lesions,      erythema or scars, no tenderness to percussion or                   palpation,   range of motion normal   Lungs:     Clear to auscultation,respirations regular, even and                  unlabored     "Heart:    Regular rhythm and normal rate, normal S1 and S2, no            murmur, no gallop, no rub, no click   Chest Wall:    No abnormalities observed   Abdomen:     Normal bowel sounds, no masses, no organomegaly, soft        non-tender, non-distended, no guarding, no rebound                tenderness   Rectal:        Extremities:   Moves all extremities well, no edema, no cyanosis, no             redness   Pulses:   Pulses palpable and equal bilaterally   Skin:   No bleeding, bruising or rash, incision are clean dry intact with evidence of infection   Lymph nodes:   No palpable adenopathy   Neurologic:   A/o x 4 with no deficits       Results Review:   {Results Review:92754::\"I reviewed the patient's new clinical results.\"    LABS/IMAGING:  Results for orders placed or performed in visit on 12/28/21   Tissue Pathology Exam    Specimen: Back, Lower; Tissue   Result Value Ref Range    Case Report       Surgical Pathology Report                         Case: [id number]                                  Authorizing Provider:  Jono Oropeza MD  Collected:           12/28/2021 11:58 AM          Ordering Location:     [facility]     Received:            12/28/2021 12:01 PM                                 [facility]                                                        Pathologist:           Alexander Marvin MD                                                            Specimen:    Back, Lower                                                                                Clinical Information      Final Diagnosis       Left back cyst, excision:   - Epidermal inclusion cyst      Gross Description       Received in formalin labeled \"back L\" are 3 tan-white rubbery soft tissue fragments.  The largest fragment measures 1.0 cm and displays tan-white skin surfaces.  The largest fragment is inked at the margins and trisected and  the specimen is submitted in entirety as 1A.        Microscopic " Description          Result Review :     Assessment/Plan     Epidermal inclusion cyst status post excision  Obstructive sleep apnea     I reviewed the pathology with the patient.  We removed his sutures.  Activity as tolerated.  Referral made for obstructive sleep apnea treatment.  Follow-up with me as needed.  Colonoscopy in the near future.  Thank you for the consult.      This document has been electronically signed by Jono Oropeza MD  January 18, 2022 08:41 EST

## 2022-02-08 NOTE — PRE-PROCEDURE INSTRUCTIONS
Called patient to discuss instructions for laxative. Patient stated understanding for 2 part prep. Discussed skin prep, clear liquid diet, and pre-op medications. Patient aware they can take Nexium.  Patient stated understanding, No other issues or concerns noted currently per patient.

## 2022-02-10 ENCOUNTER — HOSPITAL ENCOUNTER (OUTPATIENT)
Facility: HOSPITAL | Age: 52
Setting detail: HOSPITAL OUTPATIENT SURGERY
Discharge: HOME | End: 2022-02-10
Attending: SURGERY | Admitting: SURGERY

## 2022-02-10 ENCOUNTER — ANESTHESIA EVENT (OUTPATIENT)
Dept: GASTROENTEROLOGY | Facility: HOSPITAL | Age: 52
End: 2022-02-10

## 2022-02-10 ENCOUNTER — ANESTHESIA (OUTPATIENT)
Dept: GASTROENTEROLOGY | Facility: HOSPITAL | Age: 52
End: 2022-02-10

## 2022-02-10 VITALS
HEIGHT: 68 IN | SYSTOLIC BLOOD PRESSURE: 113 MMHG | DIASTOLIC BLOOD PRESSURE: 61 MMHG | WEIGHT: 202.38 LBS | RESPIRATION RATE: 17 BRPM | BODY MASS INDEX: 30.67 KG/M2 | TEMPERATURE: 97.8 F | OXYGEN SATURATION: 98 % | HEART RATE: 45 BPM

## 2022-02-10 DIAGNOSIS — Z12.11 SCREENING FOR COLORECTAL CANCER: ICD-10-CM

## 2022-02-10 DIAGNOSIS — Z12.12 SCREENING FOR COLORECTAL CANCER: ICD-10-CM

## 2022-02-10 PROCEDURE — 25010000002 PROPOFOL 10 MG/ML EMULSION: Performed by: NURSE ANESTHETIST, CERTIFIED REGISTERED

## 2022-02-10 PROCEDURE — 88305 TISSUE EXAM BY PATHOLOGIST: CPT | Performed by: SURGERY

## 2022-02-10 RX ORDER — SODIUM CHLORIDE, SODIUM LACTATE, POTASSIUM CHLORIDE, CALCIUM CHLORIDE 600; 310; 30; 20 MG/100ML; MG/100ML; MG/100ML; MG/100ML
30 INJECTION, SOLUTION INTRAVENOUS CONTINUOUS
Status: DISCONTINUED | OUTPATIENT
Start: 2022-02-10 | End: 2022-02-10 | Stop reason: HOSPADM

## 2022-02-10 RX ORDER — SODIUM CHLORIDE 0.9 % (FLUSH) 0.9 %
10 SYRINGE (ML) INJECTION AS NEEDED
Status: DISCONTINUED | OUTPATIENT
Start: 2022-02-10 | End: 2022-02-10 | Stop reason: HOSPADM

## 2022-02-10 RX ORDER — LIDOCAINE HYDROCHLORIDE 20 MG/ML
INJECTION, SOLUTION INFILTRATION; PERINEURAL AS NEEDED
Status: DISCONTINUED | OUTPATIENT
Start: 2022-02-10 | End: 2022-02-10 | Stop reason: SURG

## 2022-02-10 RX ORDER — SODIUM CHLORIDE 0.9 % (FLUSH) 0.9 %
3 SYRINGE (ML) INJECTION EVERY 12 HOURS SCHEDULED
Status: DISCONTINUED | OUTPATIENT
Start: 2022-02-10 | End: 2022-02-10 | Stop reason: HOSPADM

## 2022-02-10 RX ORDER — PROPOFOL 10 MG/ML
VIAL (ML) INTRAVENOUS AS NEEDED
Status: DISCONTINUED | OUTPATIENT
Start: 2022-02-10 | End: 2022-02-10 | Stop reason: SURG

## 2022-02-10 RX ADMIN — SODIUM CHLORIDE, POTASSIUM CHLORIDE, SODIUM LACTATE AND CALCIUM CHLORIDE 30 ML/HR: 600; 310; 30; 20 INJECTION, SOLUTION INTRAVENOUS at 10:14

## 2022-02-10 RX ADMIN — PROPOFOL 200 MCG/KG/MIN: 10 INJECTION, EMULSION INTRAVENOUS at 10:43

## 2022-02-10 RX ADMIN — PROPOFOL 50 MG: 10 INJECTION, EMULSION INTRAVENOUS at 10:43

## 2022-02-10 RX ADMIN — LIDOCAINE HYDROCHLORIDE 100 MG: 20 INJECTION, SOLUTION INFILTRATION; PERINEURAL at 10:43

## 2022-02-10 NOTE — ANESTHESIA POSTPROCEDURE EVALUATION
Patient: Ash Miller    Procedure Summary     Date: 02/10/22 Room / Location: [facility] ENDOSCOPY 1 / [facility] ENDOSCOPY    Anesthesia Start: 1041 Anesthesia Stop: 1106    Procedure: COLONOSCOPY with biopsies (N/A ) Diagnosis:       Screening for colorectal cancer      (Screening for colorectal cancer [Z12.11, Z12.12])    Surgeons: Jono Oropeza MD Provider: Sharif Gamboa MD    Anesthesia Type: general ASA Status: 2          Anesthesia Type: general    Vitals  Vitals Value Taken Time   /61 02/10/22 1125   Temp 36.6 °C (97.8 °F) 02/10/22 1125   Pulse 45 02/10/22 1125   Resp 17 02/10/22 1125   SpO2 98 % 02/10/22 1125           Post Anesthesia Care and Evaluation    Patient location during evaluation: bedside  Patient participation: complete - patient participated  Level of consciousness: awake  Pain management: adequate  Airway patency: patent  Anesthetic complications: No anesthetic complications  PONV Status: none  Cardiovascular status: acceptable and stable  Respiratory status: acceptable  Hydration status: acceptable    Comments: An Anesthesiologist personally participated in the most demanding procedures (including induction and emergence if applicable) in the anesthesia plan, monitored the course of anesthesia administration at frequent intervals and remained physically present and available for immediate diagnosis and treatment of emergencies.

## 2022-02-10 NOTE — ANESTHESIA PREPROCEDURE EVALUATION
Anesthesia Evaluation     Patient summary reviewed and Nursing notes reviewed   no history of anesthetic complications:  NPO Solid Status: > 8 hours  NPO Liquid Status: > 2 hours           Airway   Mallampati: II  TM distance: >3 FB  Neck ROM: full  No difficulty expected  Dental      Pulmonary - normal exam    breath sounds clear to auscultation  (+) sleep apnea,   Cardiovascular - negative cardio ROS and normal exam  Exercise tolerance: good (4-7 METS)    Rhythm: regular  Rate: normal        Neuro/Psych- negative ROS  GI/Hepatic/Renal/Endo - negative ROS     Musculoskeletal (-) negative ROS    Abdominal    Substance History - negative use     OB/GYN negative ob/gyn ROS         Other - negative ROS                       Anesthesia Plan    ASA 2     general   total IV anesthesia    Anesthetic plan, all risks, benefits, and alternatives have been provided, discussed and informed consent has been obtained with: patient.        CODE STATUS:

## 2022-02-15 ENCOUNTER — PREP FOR SURGERY (OUTPATIENT)
Dept: OTHER | Facility: HOSPITAL | Age: 52
End: 2022-02-15

## 2022-02-15 DIAGNOSIS — K63.5 CECAL POLYP: Primary | ICD-10-CM

## 2022-02-15 RX ORDER — SODIUM CHLORIDE 0.9 % (FLUSH) 0.9 %
10 SYRINGE (ML) INJECTION AS NEEDED
Status: CANCELLED | OUTPATIENT
Start: 2022-02-15

## 2022-02-15 RX ORDER — SODIUM CHLORIDE 0.9 % (FLUSH) 0.9 %
3 SYRINGE (ML) INJECTION EVERY 12 HOURS SCHEDULED
Status: CANCELLED | OUTPATIENT
Start: 2022-02-15

## 2022-02-15 RX ORDER — SODIUM, POTASSIUM,MAG SULFATES 17.5-3.13G
SOLUTION, RECONSTITUTED, ORAL ORAL
Qty: 354 ML | Refills: 0 | Status: ON HOLD | OUTPATIENT
Start: 2022-02-15 | End: 2022-03-07

## 2022-02-17 ENCOUNTER — OFFICE VISIT (OUTPATIENT)
Dept: SURGERY | Facility: CLINIC | Age: 52
End: 2022-02-17

## 2022-02-17 VITALS — HEIGHT: 68 IN | WEIGHT: 215 LBS | BODY MASS INDEX: 32.58 KG/M2 | RESPIRATION RATE: 14 BRPM

## 2022-02-17 DIAGNOSIS — K63.5 CECAL POLYP: Primary | ICD-10-CM

## 2022-02-17 PROCEDURE — 99212 OFFICE O/P EST SF 10 MIN: CPT | Performed by: SURGERY

## 2022-02-20 NOTE — PROGRESS NOTES
General Surgery/Colorectal Surgery Note    Patient Name:  Ash Miller  YOB: 1970  [id number]    Referring Provider: Rocio Fernandez APRN      Patient Care Team:  Rocio Fernandez APRN as PCP - General (Nurse Practitioner)  Jono Oropeza MD as Consulting Physician (General Surgery)    Chief complaint follow-up colonoscopy    Subjective .     History of present illness:    Family history of colorectal cancer first-degree relative father  Status post colonoscopy 2/10/2022 with 4 cm sessile polyp in the cecum with biopsy taken, 4 mm polyp in the anus removed.  Pathology with cecal polyp with tubulovillous adenoma, benign tissue anal polyp    He comes in for follow-up.  No changes since last seen.      History:  Past Medical History:   Diagnosis Date   • Kidney stone    • Sleep apnea        Past Surgical History:   Procedure Laterality Date   • COLONOSCOPY N/A 2/10/2022    Procedure: COLONOSCOPY with biopsies;  Surgeon: Jono Oropeza MD;  Location: [facility] ENDOSCOPY;  Service: General;  Laterality: N/A;  colon polyp, anal polyp   • ELBOW ARTHROPLASTY      right   • ROTATOR CUFF REPAIR      right       Family History   Problem Relation Age of Onset   • Malig Hyperthermia Neg Hx        Social History     Tobacco Use   • Smoking status: Never Smoker   • Smokeless tobacco: Never Used   Vaping Use   • Vaping Use: Never used   Substance Use Topics   • Alcohol use: Yes     Comment: Rarely    • Drug use: Never       Review of Systems  All systems were reviewed and negative except for:   Review of Systems   Constitutional: Negative for chills, fever and unexpected weight loss.   HENT: Negative for congestion, nosebleeds and voice change.    Eyes: Negative for blurred vision, double vision and discharge.   Respiratory: Negative for apnea, chest tightness and shortness of breath.    Cardiovascular: Negative for chest pain and leg swelling.   Gastrointestinal:        See HPI   Endocrine:  Negative for cold intolerance and heat intolerance.   Genitourinary: Negative for dysuria, hematuria and urgency.   Musculoskeletal: Negative for back pain, joint swelling and neck pain.   Skin: Negative for color change and dry skin.   Neurological: Negative for dizziness and confusion.   Hematological: Negative for adenopathy.   Psychiatric/Behavioral: Negative for agitation and behavioral problems.     MEDS:  Prior to Admission medications    Medication Sig Start Date End Date Taking? Authorizing Provider   diphenhydrAMINE-APAP, sleep, (ACETAMINOPHEN PM PO) Take 1 tablet by mouth Every Night.   Yes Provider, MD Feng   esomeprazole (nexIUM) 40 MG capsule Take 1 capsule by mouth daily. 12/21/21  Yes    sodium-potassium-magnesium sulfates (SUPREP) 17.5-3.13-1.6 GM/177ML solution oral solution Take as directed 2/15/22   Jono Oropeza MD        Allergies:  Patient has no known allergies.    Objective     Vital Signs        Physical Exam:     General Appearance:    Alert, cooperative, in no acute distress   Head:    Normocephalic, without obvious abnormality, atraumatic   Eyes:          Conjunctivae and sclerae normal, no icterus,     Ears:    Ears appear intact with no abnormalities noted   Throat:   No oral lesions, no thrush, oral mucosa moist   Neck:   No adenopathy, supple, trachea midline, no thyromegaly   Back:     No kyphosis present, no scoliosis present, no skin lesions,      erythema or scars, no tenderness to percussion or                   palpation,   range of motion normal   Lungs:     Clear to auscultation,respirations regular, even and                  unlabored    Heart:    Regular rhythm and normal rate, normal S1 and S2, no            murmur, no gallop, no rub, no click   Chest Wall:    No abnormalities observed   Abdomen:     Normal bowel sounds, no masses, no organomegaly, soft        non-tender, non-distended, no guarding, no rebound                tenderness   Rectal:       "  Extremities:   Moves all extremities well, no edema, no cyanosis, no             redness   Pulses:   Pulses palpable and equal bilaterally   Skin:   No bleeding, bruising or rash   Lymph nodes:   No palpable adenopathy   Neurologic:   A/o x 4 with no deficits       Results Review:   {Results Review:24303::\"I reviewed the patient's new clinical results.\"    LABS/IMAGING:  Results for orders placed or performed during the hospital encounter of 02/10/22   Tissue Pathology Exam    Specimen: A: Large Intestine, Cecum; Tissue    B: Anus; Tissue   Result Value Ref Range    Case Report       Surgical Pathology Report                         Case: [id number]                                  Authorizing Provider:  Jono Oropeza MD  Collected:           02/10/2022 10:50 AM          [facility] Received:            02/10/2022 11:21 AM                                 [facility]IT[facility]                                                                       Pathologist:           Alexander Marvin MD                                                            Specimens:   1) - Large Intestine, Cecum, cecum bx                                                               2) - Anus, anal polyp                                                                      Clinical Information      Final Diagnosis       1. Cecum polyp, biopsy:    - Tubulovillous adenoma      2. Anal polyp, biopsy:   - Skin with markedly dilated subepidermal lymphatic vessels          Gross Description       Part 1 is received in formalin labeled \" cecum biopsy\".  The specimen consists of multiple pale-tan irregular friable small fragments of soft tissue filtered and measuring 1 cm in greatest aggregate dimension. All 1A.     Part 2 is received in formalin labeled \" anal polyp\".  The specimen consists of 1 pale-tan irregular friable 0.5 cm feathery soft tissue fragment. All 2A. CRE      Microscopic Description          Result " Review :     Assessment/Plan     Family history of colorectal cancer first-degree relative father  Status post colonoscopy 2/10/2022 with 4 cm sessile polyp in the cecum with biopsy taken, 4 mm polyp in the anus removed.  Pathology with cecal polyp with tubulovillous adenoma, benign tissue anal polyp    I reviewed the findings of the colonoscopy with the patient.  I reviewed the pathology.  I discussed his case with Dr. Mosley who is willing to try to remove the polyp endoscopically.  Referral made.  Case request placed.  Patient and family instructed to call Dr. Hernandez office to confirm time of procedure in the near future.  All questions answered.  They agree with the plan.  If unable to remove the polyp via colonoscopy then we will proceed with right colectomy.           This document has been electronically signed by Jono Oorpeza MD  February 20, 2022 12:47 EST

## 2022-03-03 NOTE — PRE-PROCEDURE INSTRUCTIONS
PT INSTRUCTED ON CLEAR LIQ DIET AND PO SPLIT PREP LAST BM SHOULD BE CLEAR  PT CAN TAKE nexium    WITH A SIP OF WATER IN THE AM OF THE PROCEDURE ARRIVAL TIME IS 1200 pm ON march 7th

## 2022-03-07 ENCOUNTER — ANESTHESIA EVENT (OUTPATIENT)
Dept: GASTROENTEROLOGY | Facility: HOSPITAL | Age: 52
End: 2022-03-07

## 2022-03-07 ENCOUNTER — HOSPITAL ENCOUNTER (OUTPATIENT)
Facility: HOSPITAL | Age: 52
Setting detail: HOSPITAL OUTPATIENT SURGERY
Discharge: HOME | End: 2022-03-07
Attending: INTERNAL MEDICINE | Admitting: INTERNAL MEDICINE

## 2022-03-07 ENCOUNTER — ANESTHESIA (OUTPATIENT)
Dept: GASTROENTEROLOGY | Facility: HOSPITAL | Age: 52
End: 2022-03-07

## 2022-03-07 VITALS
WEIGHT: 200.62 LBS | DIASTOLIC BLOOD PRESSURE: 72 MMHG | TEMPERATURE: 97 F | BODY MASS INDEX: 30.41 KG/M2 | OXYGEN SATURATION: 97 % | SYSTOLIC BLOOD PRESSURE: 99 MMHG | HEART RATE: 64 BPM | RESPIRATION RATE: 20 BRPM | HEIGHT: 68 IN

## 2022-03-07 DIAGNOSIS — K63.5 CECAL POLYP: ICD-10-CM

## 2022-03-07 PROCEDURE — 88305 TISSUE EXAM BY PATHOLOGIST: CPT | Performed by: INTERNAL MEDICINE

## 2022-03-07 PROCEDURE — 45385 COLONOSCOPY W/LESION REMOVAL: CPT | Performed by: INTERNAL MEDICINE

## 2022-03-07 PROCEDURE — 25010000002 PROPOFOL 10 MG/ML EMULSION: Performed by: NURSE ANESTHETIST, CERTIFIED REGISTERED

## 2022-03-07 PROCEDURE — C1889 IMPLANT/INSERT DEVICE, NOC: HCPCS | Performed by: INTERNAL MEDICINE

## 2022-03-07 PROCEDURE — 45390 COLONOSCOPY W/RESECTION: CPT | Performed by: INTERNAL MEDICINE

## 2022-03-07 DEVICE — DEV CLIP HEMO RESOLUTION360/ULTR 235CM 17MM STRL: Type: IMPLANTABLE DEVICE | Site: CECUM | Status: FUNCTIONAL

## 2022-03-07 RX ORDER — SODIUM CHLORIDE 0.9 % (FLUSH) 0.9 %
3 SYRINGE (ML) INJECTION EVERY 12 HOURS SCHEDULED
Status: DISCONTINUED | OUTPATIENT
Start: 2022-03-07 | End: 2022-03-07 | Stop reason: HOSPADM

## 2022-03-07 RX ORDER — PROPOFOL 10 MG/ML
VIAL (ML) INTRAVENOUS AS NEEDED
Status: DISCONTINUED | OUTPATIENT
Start: 2022-03-07 | End: 2022-03-07 | Stop reason: SURG

## 2022-03-07 RX ORDER — LIDOCAINE HYDROCHLORIDE 20 MG/ML
INJECTION, SOLUTION INFILTRATION; PERINEURAL AS NEEDED
Status: DISCONTINUED | OUTPATIENT
Start: 2022-03-07 | End: 2022-03-07 | Stop reason: SURG

## 2022-03-07 RX ORDER — SODIUM CHLORIDE, SODIUM LACTATE, POTASSIUM CHLORIDE, CALCIUM CHLORIDE 600; 310; 30; 20 MG/100ML; MG/100ML; MG/100ML; MG/100ML
30 INJECTION, SOLUTION INTRAVENOUS CONTINUOUS
Status: DISCONTINUED | OUTPATIENT
Start: 2022-03-07 | End: 2022-03-07 | Stop reason: HOSPADM

## 2022-03-07 RX ORDER — SODIUM CHLORIDE 0.9 % (FLUSH) 0.9 %
10 SYRINGE (ML) INJECTION AS NEEDED
Status: DISCONTINUED | OUTPATIENT
Start: 2022-03-07 | End: 2022-03-07 | Stop reason: HOSPADM

## 2022-03-07 RX ADMIN — SODIUM CHLORIDE, POTASSIUM CHLORIDE, SODIUM LACTATE AND CALCIUM CHLORIDE: 600; 310; 30; 20 INJECTION, SOLUTION INTRAVENOUS at 15:46

## 2022-03-07 RX ADMIN — PROPOFOL 150 MCG/KG/MIN: 10 INJECTION, EMULSION INTRAVENOUS at 15:53

## 2022-03-07 RX ADMIN — LIDOCAINE HYDROCHLORIDE 50 MG: 20 INJECTION, SOLUTION INFILTRATION; PERINEURAL at 15:53

## 2022-03-07 RX ADMIN — PROPOFOL 110 MG: 10 INJECTION, EMULSION INTRAVENOUS at 15:53

## 2022-03-07 NOTE — H&P
"Pre Procedure History & Physical    Chief Complaint:   Large cecum polyp    Subjective     HPI:   Cecum polyp    Past Medical History:   Past Medical History:   Diagnosis Date   • Kidney stone    • Sleep apnea        Past Surgical History:  Past Surgical History:   Procedure Laterality Date   • COLONOSCOPY N/A 2/10/2022    Procedure: COLONOSCOPY with biopsies;  Surgeon: Jono Oropeza MD;  Location: [facility] ENDOSCOPY;  Service: General;  Laterality: N/A;  colon polyp, anal polyp   • CYSTOSCOPY BLADDER STONE LITHOTRIPSY     • ELBOW ARTHROPLASTY      right   • ROTATOR CUFF REPAIR      right       Family History:  Family History   Problem Relation Age of Onset   • Cancer Father    • Malig Hyperthermia Neg Hx        Social History:   reports that he has never smoked. He has never used smokeless tobacco. He reports current alcohol use. He reports that he does not use drugs.    Medications:   Medications Prior to Admission   Medication Sig Dispense Refill Last Dose   • diphenhydrAMINE-APAP, sleep, (ACETAMINOPHEN PM PO) Take 1 tablet by mouth Every Night.   3/6/2022 at Unknown time   • esomeprazole (nexIUM) 40 MG capsule Take 1 capsule by mouth daily. 90 capsule 1 3/7/2022 at Unknown time       Allergies:  Patient has no known allergies.        Objective     Blood pressure 123/69, pulse 50, temperature 97.8 °F (36.6 °C), temperature source Temporal, resp. rate 18, height 172.7 cm (67.99\"), weight 91 kg (200 lb 9.9 oz), SpO2 99 %.    Physical Exam   Constitutional: Pt is oriented to person, place, and time and well-developed, well-nourished, and in no distress.   Mouth/Throat: Oropharynx is clear and moist.   Neck: Normal range of motion.   Cardiovascular: Normal rate, regular rhythm and normal heart sounds.    Pulmonary/Chest: Effort normal and breath sounds normal.   Abdominal: Soft. Nontender  Skin: Skin is warm and dry.   Psychiatric: Mood, memory, affect and judgment normal.     Assessment/Plan "     Diagnosis:  Cecum polyp    Anticipated Surgical Procedure:  Colonoscopy    The risks, benefits, and alternatives of this procedure have been discussed with the patient or the responsible party- the patient understands and agrees to proceed.

## 2022-03-07 NOTE — EXTERNAL PATIENT INSTRUCTIONS
Patient Education   Table of Contents       Colon Polyps       Colonoscopy, Adult, Care After     To view videos and all your education online visit,   [email or web address]   or scan this QR code with your smartphone.                  Colon Polyps        Colon polyps are tissue growths inside the colon, which is part of the large intestine. They are one of the types of polyps that can grow in the body. A polyp may be a round bump or a mushroom-shaped growth. You could have one polyp or more than one.   Most colon polyps are noncancerous (benign). However, some colon polyps can become cancerous over time. Finding and removing the polyps early can help prevent this.     What are the causes?   The exact cause of colon polyps is not known.   What increases the risk?    The following factors may make you more likely to develop this condition:       Having a family history of colorectal cancer or colon polyps.       Being older than 45 years of age.       Being younger than 45 years of age and having a significant family history of colorectal cancer or colon polyps or a genetic condition that puts you at higher risk of getting colon polyps.       Having inflammatory bowel disease, such as ulcerative colitis or Crohn's disease.      Having certain conditions passed from parent to child (hereditary conditions), such as:       Familial adenomatous polyposis (FAP).       Melendez syndrome.       Turcot syndrome.       Peutz?Jeghers syndrome.       MUTYH-associated polyposis (MAP).       Being overweight.       Certain lifestyle factors. These include smoking cigarettes, drinking too much alcohol, not getting enough exercise, and eating a diet that is high in fat and red meat and low in fiber.       Having had childhood cancer that was treated with radiation of the abdomen.     What are the signs or symptoms?   Many times, there are no symptoms.    If you have symptoms, they may include:       Blood coming from the  rectum during a bowel movement.       Blood in the stool (feces). The blood may be bright red or very dark in color.       Pain in the abdomen.       A change in bowel habits, such as constipation or diarrhea.     How is this diagnosed?   This condition is diagnosed with a colonoscopy. This is a procedure in which a lighted, flexible scope is inserted into the opening between the buttocks (anus) and then passed into the colon to examine the area. Polyps are sometimes found when a colonoscopy is done as part of routine cancer screening tests.   How is this treated?   This condition is treated by removing any polyps that are found. Most polyps can be removed during a colonoscopy. Those polyps will then be tested for cancer. Additional treatment may be needed depending on the results of testing.   Follow these instructions at home:   Eating and drinking            Eat foods that are high in fiber, such as fruits, vegetables, and whole grains.       Eat foods that are high in calcium and vitamin D, such as milk, cheese, yogurt, eggs, liver, fish, and broccoli.       Limit foods that are high in fat, such as fried foods and desserts.       Limit the amount of red meat, precooked or cured meat, or other processed meat that you eat, such as hot dogs, sausages, arriaga, or meat loaves.       Limit sugary drinks.     Lifestyle         Maintain a healthy weight, or lose weight if recommended by your health care provider.       Exercise every day or as told by your health care provider.      Do not  use any products that contain nicotine or tobacco, such as cigarettes, e-cigarettes, and chewing tobacco. If you need help quitting, ask your health care provider.      Do not  drink alcohol if:       Your health care provider tells you not to drink.       You are pregnant, may be pregnant, or are planning to become pregnant.      If you drink alcohol:      Limit how much you use to:       0?1 drink a day for women.       0?2 drinks  a day for men.       Know how much alcohol is in your drink. In the [place] one drink equals one 12 oz bottle of beer (355 mL), one 5 oz glass of wine (148 mL), or one 1? oz glass of hard liquor (44 mL).     General instructions         Take over-the-counter and prescription medicines only as told by your health care provider.       Keep all follow-up visits. This is important. This includes having regularly scheduled colonoscopies. Talk to your health care provider about when you need a colonoscopy.       Contact a health care provider if:         You have new or worsening bleeding during a bowel movement.       You have new or increased blood in your stool.       You have a change in bowel habits.       You lose weight for no known reason.     Summary         Colon polyps are tissue growths inside the colon, which is part of the large intestine. They are one type of polyp that can grow in the body.       Most colon polyps are noncancerous (benign), but some can become cancerous over time.       This condition is diagnosed with a colonoscopy.       This condition is treated by removing any polyps that are found. Most polyps can be removed during a colonoscopy.     This information is not intended to replace advice given to you by your health care provider. Make sure you discuss any questions you have with your health care provider.     Document Released: 09/13/2005Document Revised: 04/07/2021Document Reviewed: 04/07/2021     [employer] Patient Education ? 2021 [employer]         Colonoscopy, Adult, Care After     This sheet gives you information about how to care for yourself after your procedure. Your doctor may also give you more specific instructions. If you have problems or questions, call your doctor.   What can I expect after the procedure?    After the procedure, it is common to have:       A small amount of blood in your poop (stool) for 24 hours.       Some gas.       Mild cramping or bloating in your belly  (abdomen).     Follow these instructions at home:   Eating and drinking            Drink enough fluid to keep your pee (urine) pale yellow.       Follow instructions from your doctor about what you cannot eat or drink.       Return to your normal diet as told by your doctor. Avoid heavy or fried foods that are hard to digest.     Activity         Rest as told by your doctor.      Do not  sit for a long time without moving. Get up to take short walks every 1?2 hours. This is important. Ask for help if you feel weak or unsteady.       Return to your normal activities as told by your doctor. Ask your doctor what activities are safe for you.     To help cramping and bloating:            Try walking around.      Put heat on your belly as told by your doctor. Use the heat source that your doctor recommends, such as a moist heat pack or a heating pad.       Put a towel between your skin and the heat source.       Leave the heat on for 20?30 minutes.       Remove the heat if your skin turns bright red. This is very important if you are unable to feel pain, heat, or cold. You may have a greater risk of getting burned.       General instructions         If you were given a medicine to help you relax (sedative) during your procedure, it can affect you for many hours. Do not  drive or use machinery until your doctor says that it is safe.      For the first 24 hours after the procedure:      Do not  sign important documents.      Do not  drink alcohol.       Do your daily activities more slowly than normal.       Eat foods that are soft and easy to digest.       Take over-the-counter or prescription medicines only as told by your doctor.       Keep all follow-up visits as told by your doctor. This is important.       Contact a doctor if:         You have blood in your poop 2?3 days after the procedure.     Get help right away if:         You have more than a small amount of blood in your poop.       You see large clumps of  tissue (blood clots) in your poop.       Your belly is swollen.       You feel like you may vomit (nauseous).       You vomit.       You have a fever.       You have belly pain that gets worse, and medicine does not help your pain.     Summary         After the procedure, it is common to have a small amount of blood in your poop. You may also have mild cramping and bloating in your belly.       If you were given a medicine to help you relax (sedative) during your procedure, it can affect you for many hours. Do not  drive or use machinery until your doctor says that it is safe.       Get help right away if you have a lot of blood in your poop, feel like you may vomit, have a fever, or have more belly pain.     This information is not intended to replace advice given to you by your health care provider. Make sure you discuss any questions you have with your health care provider.     Document Released: 01/20/2012Document Revised: 10/23/2020Document Reviewed: 07/13/2020     [employer] Patient Education ? 2021 [employer]

## 2022-03-07 NOTE — ANESTHESIA POSTPROCEDURE EVALUATION
Patient: Ash Miller    Procedure Summary     Date: 03/07/22 Room / Location: [facility] ENDOSCOPY 3 / [facility] ENDOSCOPY    Anesthesia Start: 1546 Anesthesia Stop: 1640    Procedure: COLONOSCOPY WITH POLYPECTOMY HOT SNARE, INJECTION ORISE, ENDO CLIPS X7 (N/A ) Diagnosis:       Cecal polyp      (Cecal polyp [K63.5])    Surgeons: Zachery Mosley MD Provider: Hari Ramos MD    Anesthesia Type: general ASA Status: 2          Anesthesia Type: general    Vitals  Vitals Value Taken Time   BP 99/72 03/07/22 1700   Temp 36.1 °C (97 °F) 03/07/22 1700   Pulse 64 03/07/22 1700   Resp 20 03/07/22 1700   SpO2 97 % 03/07/22 1700           Post Anesthesia Care and Evaluation    Patient location during evaluation: bedside  Patient participation: complete - patient participated  Level of consciousness: awake  Pain management: adequate  Airway patency: patent  Anesthetic complications: No anesthetic complications  PONV Status: none  Cardiovascular status: acceptable and stable  Respiratory status: acceptable  Hydration status: acceptable    Comments: An Anesthesiologist personally participated in the most demanding procedures (including induction and emergence if applicable) in the anesthesia plan, monitored the course of anesthesia administration at frequent intervals and remained physically present and available for immediate diagnosis and treatment of emergencies.

## 2022-03-07 NOTE — ANESTHESIA PREPROCEDURE EVALUATION
Anesthesia Evaluation     Patient summary reviewed and Nursing notes reviewed                Airway   Mallampati: I  TM distance: >3 FB  Neck ROM: full  No difficulty expected  Dental      Pulmonary - normal exam    breath sounds clear to auscultation  (+) shortness of breath, sleep apnea,   Cardiovascular - negative cardio ROS and normal exam    Rhythm: regular  Rate: normal        Neuro/Psych- negative ROS  GI/Hepatic/Renal/Endo    (+)   renal disease,     Musculoskeletal (-) negative ROS    Abdominal    Substance History - negative use     OB/GYN negative ob/gyn ROS         Other                        Anesthesia Plan    ASA 2     general     intravenous induction     Anesthetic plan, all risks, benefits, and alternatives have been provided, discussed and informed consent has been obtained with: patient.        CODE STATUS:

## 2022-03-12 ENCOUNTER — HOSPITAL ENCOUNTER (INPATIENT)
Facility: HOSPITAL | Age: 52
LOS: 1 days | Discharge: HOME | End: 2022-03-14
Attending: EMERGENCY MEDICINE

## 2022-03-12 DIAGNOSIS — K62.5 RECTAL BLEEDING: Primary | ICD-10-CM

## 2022-03-12 DIAGNOSIS — K62.5 BRIGHT RED RECTAL BLEEDING: ICD-10-CM

## 2022-03-12 DIAGNOSIS — Z98.890 S/P COLONOSCOPY WITH POLYPECTOMY: ICD-10-CM

## 2022-03-12 LAB
ABO GROUP BLD: NORMAL
ABO GROUP BLD: NORMAL
ALBUMIN SERPL-MCNC: 4.4 G/DL (ref 3.5–5.2)
ALBUMIN/GLOB SERPL: 1.6 G/DL
ALP SERPL-CCNC: 86 U/L (ref 39–117)
ALT SERPL W P-5'-P-CCNC: 38 U/L (ref 1–41)
ANION GAP SERPL CALCULATED.3IONS-SCNC: 9.9 MMOL/L (ref 5–15)
AST SERPL-CCNC: 28 U/L (ref 1–40)
BASOPHILS # BLD AUTO: 0.01 10*3/MM3 (ref 0–0.2)
BASOPHILS # BLD AUTO: 0.03 10*3/MM3 (ref 0–0.2)
BASOPHILS NFR BLD AUTO: 0.2 % (ref 0–1.5)
BASOPHILS NFR BLD AUTO: 0.4 % (ref 0–1.5)
BILIRUB SERPL-MCNC: 0.2 MG/DL (ref 0–1.2)
BLD GP AB SCN SERPL QL: NEGATIVE
BUN SERPL-MCNC: 14 MG/DL (ref 6–20)
BUN/CREAT SERPL: 15.2 (ref 7–25)
CALCIUM SPEC-SCNC: 9.6 MG/DL (ref 8.6–10.5)
CHLORIDE SERPL-SCNC: 106 MMOL/L (ref 98–107)
CO2 SERPL-SCNC: 26.1 MMOL/L (ref 22–29)
CREAT SERPL-MCNC: 0.92 MG/DL (ref 0.76–1.27)
DEPRECATED RDW RBC AUTO: 41.9 FL (ref 37–54)
DEPRECATED RDW RBC AUTO: 42.1 FL (ref 37–54)
EGFRCR SERPLBLD CKD-EPI 2021: 100.7 ML/MIN/1.73
EOSINOPHIL # BLD AUTO: 0.1 10*3/MM3 (ref 0–0.4)
EOSINOPHIL # BLD AUTO: 0.12 10*3/MM3 (ref 0–0.4)
EOSINOPHIL NFR BLD AUTO: 1.6 % (ref 0.3–6.2)
EOSINOPHIL NFR BLD AUTO: 1.7 % (ref 0.3–6.2)
ERYTHROCYTE [DISTWIDTH] IN BLOOD BY AUTOMATED COUNT: 12.8 % (ref 12.3–15.4)
ERYTHROCYTE [DISTWIDTH] IN BLOOD BY AUTOMATED COUNT: 13 % (ref 12.3–15.4)
GLOBULIN UR ELPH-MCNC: 2.7 GM/DL
GLUCOSE SERPL-MCNC: 99 MG/DL (ref 65–99)
HCT VFR BLD AUTO: 40.9 % (ref 37.5–51)
HCT VFR BLD AUTO: 42.2 % (ref 37.5–51)
HEMOCCULT STL QL IA: POSITIVE
HGB BLD-MCNC: 14.2 G/DL (ref 13–17.7)
HGB BLD-MCNC: 14.7 G/DL (ref 13–17.7)
HOLD SPECIMEN: NORMAL
HOLD SPECIMEN: NORMAL
IMM GRANULOCYTES # BLD AUTO: 0.01 10*3/MM3 (ref 0–0.05)
IMM GRANULOCYTES # BLD AUTO: 0.01 10*3/MM3 (ref 0–0.05)
IMM GRANULOCYTES NFR BLD AUTO: 0.1 % (ref 0–0.5)
IMM GRANULOCYTES NFR BLD AUTO: 0.2 % (ref 0–0.5)
LYMPHOCYTES # BLD AUTO: 2.5 10*3/MM3 (ref 0.7–3.1)
LYMPHOCYTES # BLD AUTO: 3.22 10*3/MM3 (ref 0.7–3.1)
LYMPHOCYTES NFR BLD AUTO: 40.7 % (ref 19.6–45.3)
LYMPHOCYTES NFR BLD AUTO: 46.5 % (ref 19.6–45.3)
MCH RBC QN AUTO: 30.8 PG (ref 26.6–33)
MCH RBC QN AUTO: 30.9 PG (ref 26.6–33)
MCHC RBC AUTO-ENTMCNC: 34.7 G/DL (ref 31.5–35.7)
MCHC RBC AUTO-ENTMCNC: 34.8 G/DL (ref 31.5–35.7)
MCV RBC AUTO: 88.7 FL (ref 79–97)
MCV RBC AUTO: 88.8 FL (ref 79–97)
MONOCYTES # BLD AUTO: 0.56 10*3/MM3 (ref 0.1–0.9)
MONOCYTES # BLD AUTO: 0.69 10*3/MM3 (ref 0.1–0.9)
MONOCYTES NFR BLD AUTO: 10 % (ref 5–12)
MONOCYTES NFR BLD AUTO: 9.1 % (ref 5–12)
NEUTROPHILS NFR BLD AUTO: 2.86 10*3/MM3 (ref 1.7–7)
NEUTROPHILS NFR BLD AUTO: 2.97 10*3/MM3 (ref 1.7–7)
NEUTROPHILS NFR BLD AUTO: 41.3 % (ref 42.7–76)
NEUTROPHILS NFR BLD AUTO: 48.2 % (ref 42.7–76)
NRBC BLD AUTO-RTO: 0 /100 WBC (ref 0–0.2)
NRBC BLD AUTO-RTO: 0 /100 WBC (ref 0–0.2)
PLATELET # BLD AUTO: 241 10*3/MM3 (ref 140–450)
PLATELET # BLD AUTO: 270 10*3/MM3 (ref 140–450)
PMV BLD AUTO: 10 FL (ref 6–12)
PMV BLD AUTO: 9.7 FL (ref 6–12)
POTASSIUM SERPL-SCNC: 4.2 MMOL/L (ref 3.5–5.2)
PROT SERPL-MCNC: 7.1 G/DL (ref 6–8.5)
RBC # BLD AUTO: 4.61 10*6/MM3 (ref 4.14–5.8)
RBC # BLD AUTO: 4.75 10*6/MM3 (ref 4.14–5.8)
RH BLD: POSITIVE
RH BLD: POSITIVE
SODIUM SERPL-SCNC: 142 MMOL/L (ref 136–145)
T&S EXPIRATION DATE: NORMAL
WBC NRBC COR # BLD: 6.15 10*3/MM3 (ref 3.4–10.8)
WBC NRBC COR # BLD: 6.93 10*3/MM3 (ref 3.4–10.8)
WHOLE BLOOD HOLD SPECIMEN: NORMAL
WHOLE BLOOD HOLD SPECIMEN: NORMAL

## 2022-03-12 PROCEDURE — 99222 1ST HOSP IP/OBS MODERATE 55: CPT | Performed by: FAMILY MEDICINE

## 2022-03-12 PROCEDURE — 86900 BLOOD TYPING SEROLOGIC ABO: CPT

## 2022-03-12 PROCEDURE — 86850 RBC ANTIBODY SCREEN: CPT | Performed by: EMERGENCY MEDICINE

## 2022-03-12 PROCEDURE — 85025 COMPLETE CBC W/AUTO DIFF WBC: CPT

## 2022-03-12 PROCEDURE — 36415 COLL VENOUS BLD VENIPUNCTURE: CPT

## 2022-03-12 PROCEDURE — 82274 ASSAY TEST FOR BLOOD FECAL: CPT | Performed by: EMERGENCY MEDICINE

## 2022-03-12 PROCEDURE — 86900 BLOOD TYPING SEROLOGIC ABO: CPT | Performed by: EMERGENCY MEDICINE

## 2022-03-12 PROCEDURE — 99284 EMERGENCY DEPT VISIT MOD MDM: CPT

## 2022-03-12 PROCEDURE — G0378 HOSPITAL OBSERVATION PER HR: HCPCS

## 2022-03-12 PROCEDURE — 86901 BLOOD TYPING SEROLOGIC RH(D): CPT | Performed by: EMERGENCY MEDICINE

## 2022-03-12 PROCEDURE — 80053 COMPREHEN METABOLIC PANEL: CPT | Performed by: EMERGENCY MEDICINE

## 2022-03-12 PROCEDURE — 86901 BLOOD TYPING SEROLOGIC RH(D): CPT

## 2022-03-12 RX ORDER — SODIUM CHLORIDE 0.9 % (FLUSH) 0.9 %
10 SYRINGE (ML) INJECTION EVERY 12 HOURS SCHEDULED
Status: DISCONTINUED | OUTPATIENT
Start: 2022-03-12 | End: 2022-03-14 | Stop reason: HOSPADM

## 2022-03-12 RX ORDER — ACETAMINOPHEN 650 MG/1
650 SUPPOSITORY RECTAL EVERY 4 HOURS PRN
Status: DISCONTINUED | OUTPATIENT
Start: 2022-03-12 | End: 2022-03-14 | Stop reason: HOSPADM

## 2022-03-12 RX ORDER — ONDANSETRON 2 MG/ML
4 INJECTION INTRAMUSCULAR; INTRAVENOUS EVERY 6 HOURS PRN
Status: DISCONTINUED | OUTPATIENT
Start: 2022-03-12 | End: 2022-03-14 | Stop reason: HOSPADM

## 2022-03-12 RX ORDER — SODIUM CHLORIDE 0.9 % (FLUSH) 0.9 %
10 SYRINGE (ML) INJECTION AS NEEDED
Status: DISCONTINUED | OUTPATIENT
Start: 2022-03-12 | End: 2022-03-13

## 2022-03-12 RX ORDER — POLYETHYLENE GLYCOL 3350 17 G/17G
17 POWDER, FOR SOLUTION ORAL DAILY PRN
Status: DISCONTINUED | OUTPATIENT
Start: 2022-03-12 | End: 2022-03-13

## 2022-03-12 RX ORDER — AMOXICILLIN 250 MG
2 CAPSULE ORAL 2 TIMES DAILY
Status: DISCONTINUED | OUTPATIENT
Start: 2022-03-12 | End: 2022-03-13

## 2022-03-12 RX ORDER — BISACODYL 10 MG
10 SUPPOSITORY, RECTAL RECTAL DAILY PRN
Status: DISCONTINUED | OUTPATIENT
Start: 2022-03-12 | End: 2022-03-13

## 2022-03-12 RX ORDER — ACETAMINOPHEN 160 MG/5ML
650 SOLUTION ORAL EVERY 4 HOURS PRN
Status: DISCONTINUED | OUTPATIENT
Start: 2022-03-12 | End: 2022-03-14 | Stop reason: HOSPADM

## 2022-03-12 RX ORDER — ACETAMINOPHEN 325 MG/1
650 TABLET ORAL EVERY 4 HOURS PRN
Status: DISCONTINUED | OUTPATIENT
Start: 2022-03-12 | End: 2022-03-14 | Stop reason: HOSPADM

## 2022-03-12 RX ORDER — BISACODYL 5 MG/1
5 TABLET, DELAYED RELEASE ORAL DAILY PRN
Status: DISCONTINUED | OUTPATIENT
Start: 2022-03-12 | End: 2022-03-13

## 2022-03-12 RX ORDER — SODIUM CHLORIDE 0.9 % (FLUSH) 0.9 %
10 SYRINGE (ML) INJECTION AS NEEDED
Status: DISCONTINUED | OUTPATIENT
Start: 2022-03-12 | End: 2022-03-14 | Stop reason: HOSPADM

## 2022-03-12 RX ADMIN — Medication 10 ML: at 23:50

## 2022-03-12 NOTE — ED NOTES
Patient states that after he eats, he gets a pain right in the middle of his stomach. Patient states no pain currently. Patient denies pain with bowel movement.

## 2022-03-12 NOTE — ED PROVIDER NOTES
Subjective   Is a 51-year-old male accompanied with his wife presenting today due to rectal bleeding that started today at 2 PM.  Patient had a colonoscopy done this past Monday by Dr. Bañuelos.  Patient states that he does not have a follow-up appointment Dr. Bañuelos scheduled.  Patient states that he had a large polyp removed during the colonoscopy.  She has another colonoscopy scheduled in 4 months.  She denies fever, chills, nausea, vomiting, dizziness or lightheadedness.  Patient's father just found out that he has colon cancer.  Patient's wife states that he has had 3 more episodes of bloody stool since being in the ER.  Patient denies being on blood thinners for a blood disorder.           History provided by:  Patient and spouse   used: No    Rectal Bleeding  Quality:  Bright red  Amount:  Moderate  Associated symptoms: no dizziness, no fever, no light-headedness and no vomiting    Risk factors: no anticoagulant use        Review of Systems   Constitutional: Negative.  Negative for fever.   HENT: Negative.    Eyes: Negative.    Respiratory: Negative.    Cardiovascular: Negative.    Gastrointestinal: Positive for hematochezia. Negative for vomiting.   Endocrine: Negative.    Genitourinary: Negative.    Musculoskeletal: Negative.    Skin: Negative.    Allergic/Immunologic: Negative.    Neurological: Negative.  Negative for dizziness and light-headedness.   Hematological: Negative.    Psychiatric/Behavioral: Negative.        Past Medical History:   Diagnosis Date   • Kidney stone    • Sleep apnea        No Known Allergies    Past Surgical History:   Procedure Laterality Date   • COLONOSCOPY N/A 2/10/2022    Procedure: COLONOSCOPY with biopsies;  Surgeon: Jono Oropeza MD;  Location: [facility] ENDOSCOPY;  Service: General;  Laterality: N/A;  colon polyp, anal polyp   • COLONOSCOPY N/A 3/7/2022    Procedure: COLONOSCOPY WITH POLYPECTOMY HOT SNARE, INJECTION ORISE, ENDO CLIPS X7;  Surgeon:  Zachery Mosley MD;  Location: [facility] ENDOSCOPY;  Service: Gastroenterology;  Laterality: N/A;  COLON POLYPS, DIVERTICULOSIS   • CYSTOSCOPY BLADDER STONE LITHOTRIPSY     • ELBOW ARTHROPLASTY      right   • ROTATOR CUFF REPAIR      right       Family History   Problem Relation Age of Onset   • Cancer Father    • Malig Hyperthermia Neg Hx        Social History     Socioeconomic History   • Marital status:    Tobacco Use   • Smoking status: Never Smoker   • Smokeless tobacco: Never Used   Vaping Use   • Vaping Use: Never used   Substance and Sexual Activity   • Alcohol use: Yes     Comment: Rarely    • Drug use: Never   • Sexual activity: Defer           Objective   Physical Exam  Vitals and nursing note reviewed.   Constitutional:       Appearance: Normal appearance.   HENT:      Head: Normocephalic and atraumatic.      Nose: Nose normal.      Mouth/Throat:      Mouth: Mucous membranes are moist.   Eyes:      Extraocular Movements: Extraocular movements intact.      Pupils: Pupils are equal, round, and reactive to light.   Cardiovascular:      Rate and Rhythm: Normal rate and regular rhythm.   Pulmonary:      Effort: Pulmonary effort is normal.      Breath sounds: Normal breath sounds.   Musculoskeletal:         General: Normal range of motion.      Cervical back: Normal range of motion and neck supple.   Skin:     General: Skin is warm and dry.   Neurological:      General: No focal deficit present.      Mental Status: He is alert and oriented to person, place, and time.   Psychiatric:         Mood and Affect: Mood normal.         Behavior: Behavior normal.         Procedures           ED Course  ED Course as of 03/12/22 1958   Sat Mar 12, 2022   1652 Pt had a colonoscopy with polypectomy on 3/7 performed by Dr. Mosley, 34 mm polyp in the cecum moved in a 4 to 6 mm polyp in the distal colon.  7 clips put in.     Call to Lilliam Jackson ordered  [AJ]   1717 CBC & Differential [AJ]   1721 Discussed with   Lilliam, he would like to watch pts H&H and re-eval after that.   I let pt and his wife now the plan and they agreed.  [AJ]   1907 I let pt and his wife know that pt's H&H was the same and the wife (who is a [employer] employee) does not feel safe taking pt home with him still have rectal bleeding.     Will repeat H&H in 3 hours. In the mean time I will call hospitilist to consult for admit.  [AJ]   1956 Consulted with Dr. Monroy, and he will admit the pt for observation.  [AJ]      ED Course User Index  [AJ] Alee Crespo PA-C                                                 MDM  Number of Diagnoses or Management Options  Bright red rectal bleeding  S/P colonoscopy with polypectomy  Diagnosis management comments:   Discussed with pt and his wife that hospitalist will admit for observation.       Amount and/or Complexity of Data Reviewed  Clinical lab tests: reviewed  Decide to obtain previous medical records or to obtain history from someone other than the patient: yes    Risk of Complications, Morbidity, and/or Mortality  Presenting problems: low  Diagnostic procedures: low  Management options: low    Patient Progress  Patient progress: stable      Final diagnoses:   Bright red rectal bleeding   S/P colonoscopy with polypectomy       ED Disposition  ED Disposition     ED Disposition   Decision to Admit    Condition   --    Comment   --             No follow-up provider specified.       Medication List      No changes were made to your prescriptions during this visit.          Alee Crespo PA-C  03/12/22 1958

## 2022-03-13 ENCOUNTER — APPOINTMENT (OUTPATIENT)
Dept: GENERAL RADIOLOGY | Facility: HOSPITAL | Age: 52
End: 2022-03-13

## 2022-03-13 ENCOUNTER — ANESTHESIA (OUTPATIENT)
Dept: GASTROENTEROLOGY | Facility: HOSPITAL | Age: 52
End: 2022-03-13

## 2022-03-13 ENCOUNTER — ANESTHESIA EVENT (OUTPATIENT)
Dept: GASTROENTEROLOGY | Facility: HOSPITAL | Age: 52
End: 2022-03-13

## 2022-03-13 PROBLEM — D62 ACUTE BLOOD LOSS ANEMIA: Status: ACTIVE | Noted: 2022-03-13

## 2022-03-13 LAB
ANION GAP SERPL CALCULATED.3IONS-SCNC: 7.6 MMOL/L (ref 5–15)
BASOPHILS # BLD AUTO: 0.02 10*3/MM3 (ref 0–0.2)
BASOPHILS NFR BLD AUTO: 0.3 % (ref 0–1.5)
BUN SERPL-MCNC: 16 MG/DL (ref 6–20)
BUN/CREAT SERPL: 16.3 (ref 7–25)
CALCIUM SPEC-SCNC: 9.2 MG/DL (ref 8.6–10.5)
CHLORIDE SERPL-SCNC: 106 MMOL/L (ref 98–107)
CO2 SERPL-SCNC: 25.4 MMOL/L (ref 22–29)
CREAT SERPL-MCNC: 0.98 MG/DL (ref 0.76–1.27)
DEPRECATED RDW RBC AUTO: 42.7 FL (ref 37–54)
EGFRCR SERPLBLD CKD-EPI 2021: 93.4 ML/MIN/1.73
EOSINOPHIL # BLD AUTO: 0.16 10*3/MM3 (ref 0–0.4)
EOSINOPHIL NFR BLD AUTO: 2.6 % (ref 0.3–6.2)
ERYTHROCYTE [DISTWIDTH] IN BLOOD BY AUTOMATED COUNT: 13 % (ref 12.3–15.4)
GLUCOSE SERPL-MCNC: 97 MG/DL (ref 65–99)
HCT VFR BLD AUTO: 35.7 % (ref 37.5–51)
HCT VFR BLD AUTO: 36.4 % (ref 37.5–51)
HGB BLD-MCNC: 12.4 G/DL (ref 13–17.7)
HGB BLD-MCNC: 12.6 G/DL (ref 13–17.7)
IMM GRANULOCYTES # BLD AUTO: 0.01 10*3/MM3 (ref 0–0.05)
IMM GRANULOCYTES NFR BLD AUTO: 0.2 % (ref 0–0.5)
LYMPHOCYTES # BLD AUTO: 2.94 10*3/MM3 (ref 0.7–3.1)
LYMPHOCYTES NFR BLD AUTO: 47 % (ref 19.6–45.3)
MCH RBC QN AUTO: 30.9 PG (ref 26.6–33)
MCHC RBC AUTO-ENTMCNC: 34.6 G/DL (ref 31.5–35.7)
MCV RBC AUTO: 89.2 FL (ref 79–97)
MONOCYTES # BLD AUTO: 0.54 10*3/MM3 (ref 0.1–0.9)
MONOCYTES NFR BLD AUTO: 8.6 % (ref 5–12)
NEUTROPHILS NFR BLD AUTO: 2.58 10*3/MM3 (ref 1.7–7)
NEUTROPHILS NFR BLD AUTO: 41.3 % (ref 42.7–76)
NRBC BLD AUTO-RTO: 0 /100 WBC (ref 0–0.2)
PLATELET # BLD AUTO: 224 10*3/MM3 (ref 140–450)
PMV BLD AUTO: 9.9 FL (ref 6–12)
POTASSIUM SERPL-SCNC: 4 MMOL/L (ref 3.5–5.2)
RBC # BLD AUTO: 4.08 10*6/MM3 (ref 4.14–5.8)
SODIUM SERPL-SCNC: 139 MMOL/L (ref 136–145)
WBC NRBC COR # BLD: 6.25 10*3/MM3 (ref 3.4–10.8)

## 2022-03-13 PROCEDURE — 25010000002 ONDANSETRON PER 1 MG: Performed by: FAMILY MEDICINE

## 2022-03-13 PROCEDURE — 99233 SBSQ HOSP IP/OBS HIGH 50: CPT | Performed by: INTERNAL MEDICINE

## 2022-03-13 PROCEDURE — 85014 HEMATOCRIT: CPT | Performed by: INTERNAL MEDICINE

## 2022-03-13 PROCEDURE — 85025 COMPLETE CBC W/AUTO DIFF WBC: CPT | Performed by: FAMILY MEDICINE

## 2022-03-13 PROCEDURE — C1889 IMPLANT/INSERT DEVICE, NOC: HCPCS | Performed by: INTERNAL MEDICINE

## 2022-03-13 PROCEDURE — 45382 COLONOSCOPY W/CONTROL BLEED: CPT | Performed by: INTERNAL MEDICINE

## 2022-03-13 PROCEDURE — 25010000002 EPINEPHRINE PER 0.1 MG: Performed by: INTERNAL MEDICINE

## 2022-03-13 PROCEDURE — 85018 HEMOGLOBIN: CPT | Performed by: INTERNAL MEDICINE

## 2022-03-13 PROCEDURE — 80048 BASIC METABOLIC PNL TOTAL CA: CPT | Performed by: FAMILY MEDICINE

## 2022-03-13 PROCEDURE — 99222 1ST HOSP IP/OBS MODERATE 55: CPT | Performed by: INTERNAL MEDICINE

## 2022-03-13 PROCEDURE — 71045 X-RAY EXAM CHEST 1 VIEW: CPT

## 2022-03-13 PROCEDURE — 0W3P8ZZ CONTROL BLEEDING IN GASTROINTESTINAL TRACT, VIA NATURAL OR ARTIFICIAL OPENING ENDOSCOPIC: ICD-10-PCS | Performed by: INTERNAL MEDICINE

## 2022-03-13 PROCEDURE — 25010000002 PROPOFOL 10 MG/ML EMULSION: Performed by: NURSE ANESTHETIST, CERTIFIED REGISTERED

## 2022-03-13 DEVICE — DEV CLIP ENDO RESOLUTION360 CONTRL ROT 235CM: Type: IMPLANTABLE DEVICE | Site: COLON | Status: FUNCTIONAL

## 2022-03-13 RX ORDER — EPINEPHRINE 1 MG/ML
INJECTION, SOLUTION, CONCENTRATE INTRAVENOUS AS NEEDED
Status: DISCONTINUED | OUTPATIENT
Start: 2022-03-13 | End: 2022-03-13 | Stop reason: HOSPADM

## 2022-03-13 RX ORDER — SODIUM CHLORIDE, SODIUM LACTATE, POTASSIUM CHLORIDE, CALCIUM CHLORIDE 600; 310; 30; 20 MG/100ML; MG/100ML; MG/100ML; MG/100ML
125 INJECTION, SOLUTION INTRAVENOUS CONTINUOUS
Status: DISCONTINUED | OUTPATIENT
Start: 2022-03-13 | End: 2022-03-13

## 2022-03-13 RX ORDER — ACETAMINOPHEN 325 MG/1
650 TABLET ORAL EVERY 6 HOURS PRN
Status: DISCONTINUED | OUTPATIENT
Start: 2022-03-13 | End: 2022-03-14 | Stop reason: HOSPADM

## 2022-03-13 RX ORDER — SODIUM CHLORIDE 9 MG/ML
INJECTION, SOLUTION INTRAVENOUS CONTINUOUS PRN
Status: DISCONTINUED | OUTPATIENT
Start: 2022-03-13 | End: 2022-03-13 | Stop reason: SURG

## 2022-03-13 RX ORDER — PROPOFOL 10 MG/ML
VIAL (ML) INTRAVENOUS AS NEEDED
Status: DISCONTINUED | OUTPATIENT
Start: 2022-03-13 | End: 2022-03-13 | Stop reason: SURG

## 2022-03-13 RX ORDER — DIPHENHYDRAMINE HCL 50 MG
50 CAPSULE ORAL NIGHTLY PRN
Status: CANCELLED | OUTPATIENT
Start: 2022-03-13

## 2022-03-13 RX ORDER — CHOLECALCIFEROL (VITAMIN D3) 125 MCG
10 CAPSULE ORAL NIGHTLY PRN
Status: DISCONTINUED | OUTPATIENT
Start: 2022-03-13 | End: 2022-03-14 | Stop reason: HOSPADM

## 2022-03-13 RX ADMIN — ACETAMINOPHEN 650 MG: 325 TABLET ORAL at 22:11

## 2022-03-13 RX ADMIN — Medication 5 MG: at 22:55

## 2022-03-13 RX ADMIN — POLYETHYLENE GLYCOL 3350, SODIUM SULFATE ANHYDROUS, SODIUM BICARBONATE, SODIUM CHLORIDE, POTASSIUM CHLORIDE 4000 ML: 236; 22.74; 6.74; 5.86; 2.97 POWDER, FOR SOLUTION ORAL at 08:38

## 2022-03-13 RX ADMIN — Medication 10 ML: at 21:12

## 2022-03-13 RX ADMIN — SODIUM CHLORIDE, POTASSIUM CHLORIDE, SODIUM LACTATE AND CALCIUM CHLORIDE 125 ML/HR: 600; 310; 30; 20 INJECTION, SOLUTION INTRAVENOUS at 09:38

## 2022-03-13 RX ADMIN — PROPOFOL 50 MG: 10 INJECTION, EMULSION INTRAVENOUS at 15:42

## 2022-03-13 RX ADMIN — ONDANSETRON 4 MG: 2 INJECTION INTRAMUSCULAR; INTRAVENOUS at 11:42

## 2022-03-13 RX ADMIN — PROPOFOL 350 MCG/KG/MIN: 10 INJECTION, EMULSION INTRAVENOUS at 15:40

## 2022-03-13 RX ADMIN — SODIUM CHLORIDE: 9 INJECTION, SOLUTION INTRAVENOUS at 15:38

## 2022-03-13 NOTE — PROGRESS NOTES
[facility]   Hospitalist Progress Note  Date: 3/13/2022  Patient Name: Ash Miller  : 1970  MRN: [id number]  Date of admission: 3/12/2022    Subjective   Subjective     Chief Complaint:   Rectal bleeding    Summary:   Ash Miller is a 51 y.o. male presents to the hospital with BRBPR.  He first noticed this about 3 pm and has had several liquid blood stools since then.  He underwent colonoscopy about 5 days ago with polypectomy of several polyps with some of them being quite large.  Postoperative bleeding resolved but then today he started having profuse bloody bowel movements around 3 PM.  Due to this he was brought to the emergency department and continues to have bloody bowel movements in the ER.  His hemoglobin was initially normal and was rechecked 3 hours later and remains normal.  However he continues to have persistent bloody bowel movements so there is concern that he may need endoscopic work-up in the hospital and may continue to have rectal hemorrhaging so we are admitting him for observation.       Interval Followup:   Patient continues to have bright red blood per rectum, several large bowel movements this a.m.  Patient daily by gastroenterology and he is undergoing colonoscopy later on this evening    Review of Systems  No nausea vomiting  Positive hematochezia  No abdominal pain  No fever no chills    Objective   Objective     Vitals:   Temp:  [97.2 °F (36.2 °C)-98.1 °F (36.7 °C)] 97.6 °F (36.4 °C)  Heart Rate:  [45-67] 67  Resp:  [16] 16  BP: (101-134)/(50-97) 101/64    Physical Exam   General appearance: NAD, conversant   Eyes: anicteric sclerae, moist conjunctivae; no lid-lag; PERRLA  HENT: Atraumatic; oropharynx clear with moist mucous membranes and no mucosal ulcerations; normal hard and soft palate  Neck: Trachea midline; FROM, supple, no thyromegaly or lymphadenopathy  Lungs: CTA, with normal respiratory effort and no intercostal retractions  CV: Regular Rate and Rhythm, no  Murmurs, Rubs, or Gallops   Abdomen: Soft, non-tender; no masses or hepatosplenomegaly  Extremities: No peripheral edema or extremity lymphadenopathy  Skin: Normal temperature, turgor and texture; no rash, ulcers or subcutaneous nodules  Psych: Appropriate affect, alert and oriented to person, place and time  Neuro: CN II - XII intact no motor deficits, no sensory deficits    Result Review    Result Review:  I have personally reviewed the results from the time of this admission to 3/13/2022 12:13 EDT and agree with these findings:  [x]  Laboratory  CBC    CBC 12/21/21 3/12/22 3/12/22 3/13/22     1505 1803    WBC 5.22 6.15 6.93 6.25   RBC 4.55 4.75 4.61 4.08 (A)   Hemoglobin 13.8 14.7 14.2 12.6 (A)   Hematocrit 41.2 42.2 40.9 36.4 (A)   MCV 90.5 88.8 88.7 89.2   MCH 30.3 30.9 30.8 30.9   MCHC 33.5 34.8 34.7 34.6   RDW 12.7 12.8 13.0 13.0   Platelets 264 270 241 224   (A) Abnormal value            CMP    CMP 9/12/21 3/12/22 3/13/22   Glucose 131 (A) 99 97   BUN 18 14 16   Creatinine 0.79 0.92 0.98   eGFR Non African Am 104     Sodium 140 142 139   Potassium 3.6 4.2 4.0   Chloride 105 106 106   Calcium 8.7 9.6 9.2   Albumin 3.10 (A) 4.40    Total Bilirubin 0.3 0.2    Alkaline Phosphatase 44 86    AST (SGOT) 32 28    ALT (SGPT) 35 38    (A) Abnormal value            []  Microbiology  []  Radiology  []  EKG/Telemetry   []  Cardiology/Vascular   []  Pathology  []  Old records  []  Other:    Assessment/Plan   Assessment / Plan     Assessment:  Hematochezia  Sleep apnea    Plan:  • Patient admitted to the hospital for further work-up and management of above  • Hemoglobin downtrending, dropped roughly 2 points at admission  • Gastroenterology consulted and following, appreciate their recommendations  • Continue bowel prep in anticipation for colonoscopy later on this evening  • N.p.o.  • Start IV fluids given dehydration  • Cross 2 units PRBCs, hold  • Clinical course will dictate further management     Telemetry: Reviewed,  sinus rhythm with episodes of bradycardia    Reviewed patients labs and imaging, and discussed with patient and nurse at bedside.    DVT prophylaxis:  Mechanical DVT prophylaxis orders are present.    CODE STATUS:   Code Status (Patient has no pulse and is not breathing): CPR (Attempt to Resuscitate)  Medical Interventions (Patient has pulse or is breathing): Full Support        Electronically signed by Kirt Streeter MD, 03/13/22, 12:13 PM EDT.

## 2022-03-13 NOTE — H&P (VIEW-ONLY)
[facility]  Initial Inpatient Consult Note    Referring Provider: ER DOCtor    Reason for Consultation: Bleeding    Subjective Rectal bleeding    History of present illness:    51 y.o. male presents to the hospital with BRBPR.  He first noticed this about 3 pm and has had several liquid blood stools since then.  He underwent colonoscopy about 5 days ago with polypectomy of several polyps with some of them being quite large.  Postoperative bleeding resolved but then today he started having profuse bloody bowel movements around 3 PM.  Due to this he was brought to the emergency department and continues to have bloody bowel movements in the ER.  His hemoglobin was initially normal and was rechecked 3 hours later and remains normal.  However he continues to have persistent bloody bowel movements patient was admitted for observation.  Continue to have persistent bleeding overnight drop in hemoglobin we are prepping him for a colonoscopy later today    Past Medical History:  Past Medical History:   Diagnosis Date   • Kidney stone    • Sleep apnea      Past Surgical History:  Past Surgical History:   Procedure Laterality Date   • COLONOSCOPY N/A 2/10/2022    Procedure: COLONOSCOPY with biopsies;  Surgeon: Jono Oropeza MD;  Location: [facility] ENDOSCOPY;  Service: General;  Laterality: N/A;  colon polyp, anal polyp   • COLONOSCOPY N/A 3/7/2022    Procedure: COLONOSCOPY WITH POLYPECTOMY HOT SNARE, INJECTION ORISE, ENDO CLIPS X7;  Surgeon: Zachery Mosley MD;  Location: [facility] ENDOSCOPY;  Service: Gastroenterology;  Laterality: N/A;  COLON POLYPS, DIVERTICULOSIS   • CYSTOSCOPY BLADDER STONE LITHOTRIPSY     • ELBOW ARTHROPLASTY      right   • ROTATOR CUFF REPAIR      right      Social History:   Social History     Tobacco Use   • Smoking status: Never Smoker   • Smokeless tobacco: Never Used   Substance Use Topics   • Alcohol use: Yes     Comment: Rarely       Family History:  Family  History   Problem Relation Age of Onset   • Cancer Father    • Malig Hyperthermia Neg Hx        Home Meds:  Medications Prior to Admission   Medication Sig Dispense Refill Last Dose   • diphenhydrAMINE-APAP, sleep, (ACETAMINOPHEN PM PO) Take 1 tablet by mouth Every Night.   Unknown at Unknown time   • esomeprazole (nexIUM) 40 MG capsule Take 1 capsule by mouth daily. 90 capsule 1 Unknown at Unknown time     Current Meds:   senna-docusate sodium, 2 tablet, Oral, BID  sodium chloride, 10 mL, Intravenous, Q12H      Allergies:  No Known Allergies  Review of Systems  Pertinent items are noted in HPI, all other systems reviewed and negative         Vital Signs  Temp:  [97.2 °F (36.2 °C)-98.1 °F (36.7 °C)] 97.5 °F (36.4 °C)  Heart Rate:  [45-64] 55  Resp:  [16] 16  BP: (104-134)/(50-97) 104/50  Physical Exam:  General Appearance:    Alert, cooperative, in no acute distress   Head:    Normocephalic, without obvious abnormality, atraumatic   Eyes:          conjunctivae and sclerae normal, no   icterus   Throat:   no thrush, oral mucosa moist   Neck:   Supple, no adenopathy   Lungs:     Clear to auscultation bilaterally    Heart:    Regular rhythm and normal rate    Chest Wall:    No abnormalities observed   Abdomen:     Soft, nondistended, nontender; normal bowel sounds   Extremities:   no edema, no redness   Skin:   No bruising or rash   Psychiatric:  normal mood and insight     Results Review:  [x]  Laboratory   [x]  Radiology  []  Pathology      I reviewed the patient's new clinical results.    Results from last 7 days   Lab Units 03/13/22  0504 03/12/22  1803 03/12/22  1505   WBC 10*3/mm3 6.25 6.93 6.15   HEMOGLOBIN g/dL 12.6* 14.2 14.7   HEMATOCRIT % 36.4* 40.9 42.2   PLATELETS 10*3/mm3 224 241 270     Results from last 7 days   Lab Units 03/13/22  0504 03/12/22  1505   SODIUM mmol/L 139 142   POTASSIUM mmol/L 4.0 4.2   CHLORIDE mmol/L 106 106   CO2 mmol/L 25.4 26.1   BUN mg/dL 16 14   CREATININE mg/dL 0.98 0.92    CALCIUM mg/dL 9.2 9.6   BILIRUBIN mg/dL  --  0.2   ALK PHOS U/L  --  86   ALT (SGPT) U/L  --  38   AST (SGOT) U/L  --  28   GLUCOSE mg/dL 97 99         No results found for: LIPASE    Radiology:  No orders to display        Assessment/Plan     Patient Active Problem List   Diagnosis   • COVID-19   • Dyspnea on exertion   • Cytokine release syndrome, grade 1   • Bicipital tendinitis   • Bladder problem   • Complete tear of right rotator cuff   • Muscle cramps   • Leg pain   • Subacromial impingement of right shoulder   • Renal calculus   • Screening for colorectal cancer   • Cecal polyp   • Rectal bleeding        Plan:  Quick prep and colonoscopy to be scheduled today      I discussed the patients findings and my recommendations with patient.    Electronically signed by Etienne Vyas MD, 03/13/22, 9:56 AM EDT.

## 2022-03-13 NOTE — OP NOTE
Procedure:    colonoscopy with injection and clipping of lesion    Exam to cecum    Blood loss: None  Specimens: None    Prep: Good    Anesthesia:  as per their note with MAC    Patient was sedated placed in the left lateral decubitus position scope was passed to the cecum examination of the cecum there was a mucosal lesion from the prior polypectomy site there was a small excoriation/defect with 1 clip over this area I placed another clip   the other clips over the site appeared intact and there was some stigmata of recent bleeding with erythematous area we injected a total of 10 cc of 1/10,000 epinephrine  There was no significant bleeding seen  The rest of the colon appeared normal other than minimal internal hemorrhoids    Plan   clear liquids today  Monitor H&H  If stable advance to soft diet tomorrow hopefully can be discharged home tomorrow  Follow-up with GI as outpatient  No NSAIDs Advil Aleve or blood thinners for at least 2 weeks

## 2022-03-13 NOTE — CONSULTS
[facility]  Initial Inpatient Consult Note    Referring Provider: ER DOCtor    Reason for Consultation: Bleeding    Subjective Rectal bleeding    History of present illness:    51 y.o. male presents to the hospital with BRBPR.  He first noticed this about 3 pm and has had several liquid blood stools since then.  He underwent colonoscopy about 5 days ago with polypectomy of several polyps with some of them being quite large.  Postoperative bleeding resolved but then today he started having profuse bloody bowel movements around 3 PM.  Due to this he was brought to the emergency department and continues to have bloody bowel movements in the ER.  His hemoglobin was initially normal and was rechecked 3 hours later and remains normal.  However he continues to have persistent bloody bowel movements patient was admitted for observation.  Continue to have persistent bleeding overnight drop in hemoglobin we are prepping him for a colonoscopy later today    Past Medical History:  Past Medical History:   Diagnosis Date   • Kidney stone    • Sleep apnea      Past Surgical History:  Past Surgical History:   Procedure Laterality Date   • COLONOSCOPY N/A 2/10/2022    Procedure: COLONOSCOPY with biopsies;  Surgeon: Jono Oropeza MD;  Location: [facility] ENDOSCOPY;  Service: General;  Laterality: N/A;  colon polyp, anal polyp   • COLONOSCOPY N/A 3/7/2022    Procedure: COLONOSCOPY WITH POLYPECTOMY HOT SNARE, INJECTION ORISE, ENDO CLIPS X7;  Surgeon: Zachery Mosley MD;  Location: [facility] ENDOSCOPY;  Service: Gastroenterology;  Laterality: N/A;  COLON POLYPS, DIVERTICULOSIS   • CYSTOSCOPY BLADDER STONE LITHOTRIPSY     • ELBOW ARTHROPLASTY      right   • ROTATOR CUFF REPAIR      right      Social History:   Social History     Tobacco Use   • Smoking status: Never Smoker   • Smokeless tobacco: Never Used   Substance Use Topics   • Alcohol use: Yes     Comment: Rarely       Family History:  Family  History   Problem Relation Age of Onset   • Cancer Father    • Malig Hyperthermia Neg Hx        Home Meds:  Medications Prior to Admission   Medication Sig Dispense Refill Last Dose   • diphenhydrAMINE-APAP, sleep, (ACETAMINOPHEN PM PO) Take 1 tablet by mouth Every Night.   Unknown at Unknown time   • esomeprazole (nexIUM) 40 MG capsule Take 1 capsule by mouth daily. 90 capsule 1 Unknown at Unknown time     Current Meds:   senna-docusate sodium, 2 tablet, Oral, BID  sodium chloride, 10 mL, Intravenous, Q12H      Allergies:  No Known Allergies  Review of Systems  Pertinent items are noted in HPI, all other systems reviewed and negative         Vital Signs  Temp:  [97.2 °F (36.2 °C)-98.1 °F (36.7 °C)] 97.5 °F (36.4 °C)  Heart Rate:  [45-64] 55  Resp:  [16] 16  BP: (104-134)/(50-97) 104/50  Physical Exam:  General Appearance:    Alert, cooperative, in no acute distress   Head:    Normocephalic, without obvious abnormality, atraumatic   Eyes:          conjunctivae and sclerae normal, no   icterus   Throat:   no thrush, oral mucosa moist   Neck:   Supple, no adenopathy   Lungs:     Clear to auscultation bilaterally    Heart:    Regular rhythm and normal rate    Chest Wall:    No abnormalities observed   Abdomen:     Soft, nondistended, nontender; normal bowel sounds   Extremities:   no edema, no redness   Skin:   No bruising or rash   Psychiatric:  normal mood and insight     Results Review:  [x]  Laboratory   [x]  Radiology  []  Pathology      I reviewed the patient's new clinical results.    Results from last 7 days   Lab Units 03/13/22  0504 03/12/22  1803 03/12/22  1505   WBC 10*3/mm3 6.25 6.93 6.15   HEMOGLOBIN g/dL 12.6* 14.2 14.7   HEMATOCRIT % 36.4* 40.9 42.2   PLATELETS 10*3/mm3 224 241 270     Results from last 7 days   Lab Units 03/13/22  0504 03/12/22  1505   SODIUM mmol/L 139 142   POTASSIUM mmol/L 4.0 4.2   CHLORIDE mmol/L 106 106   CO2 mmol/L 25.4 26.1   BUN mg/dL 16 14   CREATININE mg/dL 0.98 0.92    CALCIUM mg/dL 9.2 9.6   BILIRUBIN mg/dL  --  0.2   ALK PHOS U/L  --  86   ALT (SGPT) U/L  --  38   AST (SGOT) U/L  --  28   GLUCOSE mg/dL 97 99         No results found for: LIPASE    Radiology:  No orders to display        Assessment/Plan     Patient Active Problem List   Diagnosis   • COVID-19   • Dyspnea on exertion   • Cytokine release syndrome, grade 1   • Bicipital tendinitis   • Bladder problem   • Complete tear of right rotator cuff   • Muscle cramps   • Leg pain   • Subacromial impingement of right shoulder   • Renal calculus   • Screening for colorectal cancer   • Cecal polyp   • Rectal bleeding        Plan:  Quick prep and colonoscopy to be scheduled today      I discussed the patients findings and my recommendations with patient.    Electronically signed by Etienne Vyas MD, 03/13/22, 9:56 AM EDT.

## 2022-03-13 NOTE — ANESTHESIA PREPROCEDURE EVALUATION
Anesthesia Evaluation     Patient summary reviewed and Nursing notes reviewed   NPO Solid Status: > 8 hours  NPO Liquid Status: > 8 hours           Airway   Mallampati: II  TM distance: >3 FB  Dental      Pulmonary - normal exam   (+) shortness of breath, sleep apnea,   Cardiovascular - negative cardio ROS and normal exam  Exercise tolerance: good (4-7 METS)        Neuro/Psych  GI/Hepatic/Renal/Endo    (+)  GI bleeding , renal disease,     Musculoskeletal (-) negative ROS    Abdominal  - normal exam   Substance History - negative use     OB/GYN negative ob/gyn ROS         Other                        Anesthesia Plan    ASA 2     general and MAC     intravenous induction     Anesthetic plan, all risks, benefits, and alternatives have been provided, discussed and informed consent has been obtained with: patient.        CODE STATUS:    Code Status (Patient has no pulse and is not breathing): CPR (Attempt to Resuscitate)  Medical Interventions (Patient has pulse or is breathing): Full Support

## 2022-03-13 NOTE — H&P
[facility] HISTORY AND PHYSICAL  Date: 3/12/2022   Patient Name: Ash Miller  : 1970  MRN: [id number]  Primary Care Physician:  Rocio Fernandez APRN  Date of admission: 3/12/2022    Subjective   Subjective     Chief Complaint: rectal bleeding    HPI:    Ash Miller is a 51 y.o. male presents to the hospital with BRBPR.  He first noticed this about 3 pm and has had several liquid blood stools since then.  He underwent colonoscopy about 5 days ago with polypectomy of several polyps with some of them being quite large.  Postoperative bleeding resolved but then today he started having profuse bloody bowel movements around 3 PM.  Due to this he was brought to the emergency department and continues to have bloody bowel movements in the ER.  His hemoglobin was initially normal and was rechecked 3 hours later and remains normal.  However he continues to have persistent bloody bowel movements so there is concern that he may need endoscopic work-up in the hospital and may continue to have rectal hemorrhaging so we are admitting him for observation.      Personal History     Past Medical History:  Past Medical History:   Diagnosis Date   • Kidney stone    • Sleep apnea          Past Surgical History:  Past Surgical History:   Procedure Laterality Date   • COLONOSCOPY N/A 2/10/2022    Procedure: COLONOSCOPY with biopsies;  Surgeon: Jono Oropeza MD;  Location: [facility] ENDOSCOPY;  Service: General;  Laterality: N/A;  colon polyp, anal polyp   • COLONOSCOPY N/A 3/7/2022    Procedure: COLONOSCOPY WITH POLYPECTOMY HOT SNARE, INJECTION ORISE, ENDO CLIPS X7;  Surgeon: Zachery Mosley MD;  Location: [facility] ENDOSCOPY;  Service: Gastroenterology;  Laterality: N/A;  COLON POLYPS, DIVERTICULOSIS   • CYSTOSCOPY BLADDER STONE LITHOTRIPSY     • ELBOW ARTHROPLASTY      right   • ROTATOR CUFF REPAIR      right         Family History:   Family History   Problem Relation Age of Onset   • Cancer  Father    • Malig Hyperthermia Neg Hx          Social History:   Social History     Tobacco Use   • Smoking status: Never Smoker   • Smokeless tobacco: Never Used   Vaping Use   • Vaping Use: Never used   Substance Use Topics   • Alcohol use: Yes     Comment: Rarely    • Drug use: Never         Home Medications:  diphenhydrAMINE-APAP (sleep) and esomeprazole    Allergies:  No Known Allergies    Review of Systems   All systems were reviewed and negative except for: noted above or in HPI    Objective   Objective     Vitals:   Temp:  [98.1 °F (36.7 °C)] 98.1 °F (36.7 °C)  Heart Rate:  [53-64] 59  Resp:  [16] 16  BP: (105-134)/(62-97) 105/64    Physical Exam    Constitutional: Awake, alert, no acute distress   Eyes: Pupils equal, sclerae anicteric, no conjunctival injection   HENT: NCAT, mucous membranes moist   Neck: Supple, no thyromegaly, no lymphadenopathy, trachea midline   Respiratory: Clear to auscultation bilaterally, nonlabored respirations    Cardiovascular: RRR, no murmurs, rubs, or gallops, palpable pedal pulses bilaterally   Gastrointestinal: Positive bowel sounds, soft, nontender, nondistended   Musculoskeletal: No bilateral ankle edema, no clubbing or cyanosis to extremities   Psychiatric: Appropriate affect, cooperative   Neurologic: Oriented x 3, strength symmetric in all extremities, Cranial Nerves grossly intact to confrontation, speech clear   Skin: No rashes         Assessment/Plan   Assessment / Plan     Assessment/Plan:   • GI bleeding - s/p polypectomy 5 days ago  • Sleep apnea      Patient admitted for medical monitoring  Monitor vital signs for evidence of hypovolemia/hemorrhagic shock  Clear liquid diet  Discussed the case with Dr. Bañuelos.  He would like the patient to be monitored overnight in the hospital.  Dr. Bañuelos would like to be notified if the patient has continued bleeding or hemoglobin drops and we will most likely have to prep him for repeat colonoscopy.  If his hemoglobin does not  drop or if his bleeding resolves or both he may be able to be discharged home.  CBC in am for surveillance of any acute blood loss or thrombocytopenia  Metabolic panel in the morning to evaluate electrolytes and renal function  Reviewed today's labs: hgb normal and stable for now  Reviewed telemetry: NSR  Discussed with ER physician  Risk of primary complaint: patient is at significant risk of morbidity if primary complaint is not treated especially considering the patient's comorbidities.  DVT prophylaxis:  Mechanical DVT prophylaxis orders are present.    CODE STATUS:    Code Status (Patient has no pulse and is not breathing): CPR (Attempt to Resuscitate)  Medical Interventions (Patient has pulse or is breathing): Full Support      Admission Status:  I believe this patient meets observation status.    Electronically signed by Donnie Handy DO, 03/12/22, 10:22 PM EST.

## 2022-03-13 NOTE — ANESTHESIA POSTPROCEDURE EVALUATION
Patient: Ash Miller    Procedure Summary     Date: 03/13/22 Room / Location: [facility] ENDOSCOPY 3 / [facility] ENDOSCOPY    Anesthesia Start: 1537 Anesthesia Stop: 1608    Procedure: COLONOSCOPY with clip and epi. (N/A ) Diagnosis:       Rectal bleeding      (Rectal bleeding [K62.5])    Surgeons: tEienne Vyas MD Provider: Tonio Ott MD    Anesthesia Type: general, MAC ASA Status: 2          Anesthesia Type: general, MAC    Vitals  Vitals Value Taken Time   /68 03/13/22 1609   Temp     Pulse 60 03/13/22 1609   Resp 18 03/13/22 1609   SpO2 99 % 03/13/22 1609           Post Anesthesia Care and Evaluation    Patient location during evaluation: bedside  Patient participation: complete - patient participated  Level of consciousness: awake  Pain management: adequate  Airway patency: patent  Anesthetic complications: No anesthetic complications  PONV Status: none  Cardiovascular status: acceptable and stable  Respiratory status: acceptable and room air  Hydration status: acceptable    Comments: An Anesthesiologist personally participated in the most demanding procedures (including induction and emergence if applicable) in the anesthesia plan, monitored the course of anesthesia administration at frequent intervals and remained physically present and available for immediate diagnosis and treatment of emergencies.

## 2022-03-13 NOTE — PLAN OF CARE
Goal Outcome Evaluation:   VSS. Pt completed bowel prep today, sent to endo with bloody bowel movements. See operative note. Pt now tolerating regular diet. Hgb stable. Will continue to monitor. Aliya Sky RN

## 2022-03-14 ENCOUNTER — PREP FOR SURGERY (OUTPATIENT)
Dept: OTHER | Facility: HOSPITAL | Age: 52
End: 2022-03-14

## 2022-03-14 ENCOUNTER — TELEPHONE (OUTPATIENT)
Dept: GASTROENTEROLOGY | Facility: CLINIC | Age: 52
End: 2022-03-14

## 2022-03-14 VITALS
TEMPERATURE: 98.5 F | WEIGHT: 201.28 LBS | OXYGEN SATURATION: 98 % | HEART RATE: 86 BPM | DIASTOLIC BLOOD PRESSURE: 48 MMHG | HEIGHT: 68 IN | SYSTOLIC BLOOD PRESSURE: 105 MMHG | BODY MASS INDEX: 30.51 KG/M2 | RESPIRATION RATE: 18 BRPM

## 2022-03-14 DIAGNOSIS — Z86.010 ENCOUNTER FOR COLONOSCOPY FOLLOWING COLON POLYP REMOVAL: Primary | ICD-10-CM

## 2022-03-14 DIAGNOSIS — Z09 ENCOUNTER FOR COLONOSCOPY FOLLOWING COLON POLYP REMOVAL: Primary | ICD-10-CM

## 2022-03-14 LAB
HCT VFR BLD AUTO: 28.7 % (ref 37.5–51)
HGB BLD-MCNC: 10.1 G/DL (ref 13–17.7)

## 2022-03-14 PROCEDURE — 85018 HEMOGLOBIN: CPT | Performed by: INTERNAL MEDICINE

## 2022-03-14 PROCEDURE — 85014 HEMATOCRIT: CPT | Performed by: INTERNAL MEDICINE

## 2022-03-14 PROCEDURE — 99239 HOSP IP/OBS DSCHRG MGMT >30: CPT | Performed by: INTERNAL MEDICINE

## 2022-03-14 RX ORDER — DOXYCYCLINE HYCLATE 50 MG/1
324 CAPSULE, GELATIN COATED ORAL
Qty: 60 TABLET | Refills: 0 | Status: SHIPPED | OUTPATIENT
Start: 2022-03-14 | End: 2023-01-24

## 2022-03-14 RX ADMIN — ACETAMINOPHEN 650 MG: 325 TABLET ORAL at 04:16

## 2022-03-14 RX ADMIN — Medication 10 ML: at 09:08

## 2022-03-14 NOTE — PLAN OF CARE
Goal Outcome Evaluation:  Plan of Care Reviewed With: patient        Progress: no change  Outcome Evaluation: pt ran low grade temp throughout the shift. CXR ordered by provider. tylenol given as ordered.  no rectal bleeding noted this shift.

## 2022-03-14 NOTE — DISCHARGE SUMMARY
[facility]         HOSPITALIST  DISCHARGE SUMMARY    Patient Name: Ash Miller  : 1970  MRN: [id number]    Date of Admission: 3/12/2022  Date of Discharge:  3/14/2022  Primary Care Physician: Rocio Fernandez APRN    Consults     Date and Time Order Name Status Description    3/12/2022 10:22 PM Inpatient Gastroenterology Consult Completed     3/12/2022  7:18 PM Inpatient Hospitalist Consult      3/12/2022  4:52 PM IP General Consult (Use specialty-specific consult if known)            Active and Resolved Hospital Problems:  Hematochezia  Sleep apnea    Hospital Course     Hospital Course:  Ash Miller is a 51 y.o. male presents to the hospital with BRBPR.  He first noticed this about 3 pm and has had several liquid blood stools since then.  He underwent colonoscopy about 5 days ago with polypectomy of several polyps with some of them being quite large.  Postoperative bleeding resolved but then today he started having profuse bloody bowel movements around 3 PM.  Due to this he was brought to the emergency department and continues to have bloody bowel movements in the ER.  His hemoglobin was initially normal and was rechecked 3 hours later and remains normal.  However he continues to have persistent bloody bowel movements so there is concern that he may need endoscopic work-up in the hospital and may continue to have rectal hemorrhaging so we are admitting him for observation.    Day of Discharge     Vital Signs:  Temp:  [97.5 °F (36.4 °C)-100.6 °F (38.1 °C)] 98.5 °F (36.9 °C)  Heart Rate:  [] 86  Resp:  [16-18] 18  BP: ()/(41-68) 105/48    Physical Exam:   General appearance: NAD, conversant   Eyes: anicteric sclerae, moist conjunctivae; no lid-lag; PERRLA  HENT: Atraumatic; oropharynx clear with moist mucous membranes and no mucosal ulcerations; normal hard and soft palate  Neck: Trachea midline; FROM, supple, no thyromegaly or lymphadenopathy  Lungs: CTA, with normal  respiratory effort and no intercostal retractions  CV: Regular Rate and Rhythm, no Murmurs, Rubs, or Gallops   Abdomen: Soft, non-tender; no masses or Heptosplenomegally  Extremities: No peripheral edema or extremity lymphadenopathy  Skin: Normal temperature, turgor and texture; no rash, ulcers or subcutaneous nodules  Psych: Appropriate affect, alert and oriented to person, place and time  Neuro: CN II - XII intact no motor deficits, no sensory defecits      Discharge Details        Discharge Medications      New Medications      Instructions Start Date   ferrous gluconate 324 MG tablet  Commonly known as: FERGON   324 mg, Oral, Daily With Breakfast         Continue These Medications      Instructions Start Date   ACETAMINOPHEN PM PO   1 tablet, Oral, Nightly      esomeprazole 40 MG capsule  Commonly known as: nexIUM   Take 1 capsule by mouth daily.             No Known Allergies    Discharge Disposition:  Home or Self Care    Diet:  Hospital:  Diet Order   Procedures   • Diet Regular       Discharge Activity:       CODE STATUS:  Code Status and Medical Interventions:   Ordered at: 03/12/22 2222     Code Status (Patient has no pulse and is not breathing):    CPR (Attempt to Resuscitate)     Medical Interventions (Patient has pulse or is breathing):    Full Support       No future appointments.    Additional Instructions for the Follow-ups that You Need to Schedule     Discharge Follow-up with PCP   As directed       Currently Documented PCP:    Rocio Fernandez APRN    PCP Phone Number:    [phone]     Follow Up Details: 3 to 7 days         Discharge Follow-up with Specified Provider: oscar; 1 Week   As directed      To: oscar    Follow Up: 1 Week               Pertinent  and/or Most Recent Results     IMAGING:  XR Chest 1 View    Result Date: 3/13/2022  PROCEDURE: XR CHEST 1 VW  COMPARISON: [facility], , XR CHEST 1 VW, 9/08/2021, 20:33.  INDICATIONS: fever postop  FINDINGS: A single AP  upright portable chest radiograph was performed.  Decreased bilateral infiltrates are noted since 9/8/2021.  Minimal, if any, infiltrates persist.  There may be chronic calcified granulomatous disease of the chest.  External artifacts obscure detail.  No cardiac enlargement.  No pneumothorax.       Favorable progression is suggested radiographically with decreased bilateral infiltrates.  Minimal, if any, infiltrates persist.     BARBARA SHI JR, MD       Electronically Signed and Approved By: BARBARA SHI JR, MD on 3/13/2022 at 22:35               LAB RESULTS:      Lab 03/14/22  1315 03/13/22  1319 03/13/22  0504 03/12/22  1803 03/12/22  1505   WBC  --   --  6.25 6.93 6.15   HEMOGLOBIN 10.1* 12.4* 12.6* 14.2 14.7   HEMATOCRIT 28.7* 35.7* 36.4* 40.9 42.2   PLATELETS  --   --  224 241 270   NEUTROS ABS  --   --  2.58 2.86 2.97   IMMATURE GRANS (ABS)  --   --  0.01 0.01 0.01   LYMPHS ABS  --   --  2.94 3.22* 2.50   MONOS ABS  --   --  0.54 0.69 0.56   EOS ABS  --   --  0.16 0.12 0.10   MCV  --   --  89.2 88.7 88.8         Lab 03/13/22  0504 03/12/22  1505   SODIUM 139 142   POTASSIUM 4.0 4.2   CHLORIDE 106 106   CO2 25.4 26.1   ANION GAP 7.6 9.9   BUN 16 14   CREATININE 0.98 0.92   EGFR 93.4 100.7   GLUCOSE 97 99   CALCIUM 9.2 9.6         Lab 03/12/22  1505   TOTAL PROTEIN 7.1   ALBUMIN 4.40   GLOBULIN 2.7   ALT (SGPT) 38   AST (SGOT) 28   BILIRUBIN 0.2   ALK PHOS 86                 Lab 03/12/22  1613 03/12/22  1505   ABO TYPING A A   RH TYPING Positive Positive   ANTIBODY SCREEN  --  Negative         Brief Urine Lab Results     None        Microbiology Results (last 10 days)     ** No results found for the last 240 hours. **        Results for orders placed during the hospital encounter of 10/12/21    Duplex venous lower extremity right CAR    Interpretation Summary  · Normal right lower extremity venous duplex scan.    Results for orders placed during the hospital encounter of 10/12/21    Duplex venous lower  extremity right CAR    Interpretation Summary  · Normal right lower extremity venous duplex scan.    Results for orders placed in visit on 08/03/21    Adult Transthoracic Echo Complete W/ Cont if Necessary Per Protocol    Interpretation Summary  1.  Normal left ventricular systolic function.  2.  No significant valve abnormalities noted.      Time spent on Discharge including face to face service: Greater than 30 minutes      Electronically signed by Kirt Streeter MD, 03/14/22, 2:30 PM EDT.

## 2022-03-14 NOTE — PLAN OF CARE
Goal Outcome Evaluation:   Education provided regarding follow up appointments with PCP and GI doctor. Educated on importance to adhering to treatment plan and when to call PCP or report to ER with any ongoing or worsening symptoms. Continue regular diet as tolerated and monitor for any signs of bleeding. Pt acknowledges understanding and verified via tach back method. Wife present at bedside and acknowledged treatment regimen as well.

## 2022-03-14 NOTE — TELEPHONE ENCOUNTER
----- Message from GOVIND Kolb sent at 3/12/2022  8:21 PM EST -----  Recall 4 mo d/t piecemeal polypectomy. Bx ok but precancerous

## 2022-03-15 ENCOUNTER — READMISSION MANAGEMENT (OUTPATIENT)
Dept: CALL CENTER | Facility: HOSPITAL | Age: 52
End: 2022-03-15

## 2022-03-15 NOTE — OUTREACH NOTE
Prep Survey    Flowsheet Row Responses   [facility] facility patient discharged from? [facility]   Is LACE score < 7 ? Yes   Emergency Room discharge w/ pulse ox? No   Eligibility Not Eligible   What are the reasons patient is not eligible? Other   Does the patient have one of the following disease processes/diagnoses(primary or secondary)? Other   Prep survey completed? Yes          TOSIN ZAPIEN - Registered Nurse

## 2022-03-17 PROBLEM — Z12.11 ENCOUNTER FOR COLONOSCOPY FOLLOWING COLON POLYP REMOVAL: Status: ACTIVE | Noted: 2022-03-17

## 2022-03-17 PROBLEM — Z86.010 ENCOUNTER FOR COLONOSCOPY FOLLOWING COLON POLYP REMOVAL: Status: ACTIVE | Noted: 2022-03-17

## 2022-03-17 PROBLEM — Z86.0100 ENCOUNTER FOR COLONOSCOPY FOLLOWING COLON POLYP REMOVAL: Status: ACTIVE | Noted: 2022-03-17

## 2022-03-17 PROBLEM — Z09 ENCOUNTER FOR COLONOSCOPY FOLLOWING COLON POLYP REMOVAL: Status: ACTIVE | Noted: 2022-03-17

## 2022-03-17 NOTE — TELEPHONE ENCOUNTER
Colonoscopy scheduled for 07/11/2022 arrival time 0730. Clear liquid starting on 07/10/2022. Educated and mailed pt on clear liquid, bowel prep and colonoscopy procedure. Pt verified understanding.

## 2022-07-11 ENCOUNTER — ANESTHESIA EVENT (OUTPATIENT)
Dept: GASTROENTEROLOGY | Facility: HOSPITAL | Age: 52
End: 2022-07-11

## 2022-07-11 ENCOUNTER — ANESTHESIA (OUTPATIENT)
Dept: GASTROENTEROLOGY | Facility: HOSPITAL | Age: 52
End: 2022-07-11

## 2022-07-11 ENCOUNTER — HOSPITAL ENCOUNTER (OUTPATIENT)
Facility: HOSPITAL | Age: 52
Setting detail: HOSPITAL OUTPATIENT SURGERY
Discharge: HOME | End: 2022-07-11
Attending: INTERNAL MEDICINE | Admitting: INTERNAL MEDICINE

## 2022-07-11 VITALS
RESPIRATION RATE: 13 BRPM | HEART RATE: 52 BPM | BODY MASS INDEX: 30.97 KG/M2 | SYSTOLIC BLOOD PRESSURE: 95 MMHG | OXYGEN SATURATION: 97 % | TEMPERATURE: 97 F | WEIGHT: 203.71 LBS | DIASTOLIC BLOOD PRESSURE: 57 MMHG

## 2022-07-11 DIAGNOSIS — Z86.010 ENCOUNTER FOR COLONOSCOPY FOLLOWING COLON POLYP REMOVAL: ICD-10-CM

## 2022-07-11 DIAGNOSIS — Z09 ENCOUNTER FOR COLONOSCOPY FOLLOWING COLON POLYP REMOVAL: ICD-10-CM

## 2022-07-11 PROCEDURE — 88305 TISSUE EXAM BY PATHOLOGIST: CPT | Performed by: INTERNAL MEDICINE

## 2022-07-11 PROCEDURE — 45385 COLONOSCOPY W/LESION REMOVAL: CPT | Performed by: INTERNAL MEDICINE

## 2022-07-11 PROCEDURE — 45380 COLONOSCOPY AND BIOPSY: CPT | Performed by: INTERNAL MEDICINE

## 2022-07-11 PROCEDURE — 25010000002 PROPOFOL 10 MG/ML EMULSION: Performed by: NURSE ANESTHETIST, CERTIFIED REGISTERED

## 2022-07-11 RX ORDER — PROPOFOL 10 MG/ML
VIAL (ML) INTRAVENOUS AS NEEDED
Status: DISCONTINUED | OUTPATIENT
Start: 2022-07-11 | End: 2022-07-11 | Stop reason: SURG

## 2022-07-11 RX ORDER — LIDOCAINE HYDROCHLORIDE 20 MG/ML
INJECTION, SOLUTION EPIDURAL; INFILTRATION; INTRACAUDAL; PERINEURAL AS NEEDED
Status: DISCONTINUED | OUTPATIENT
Start: 2022-07-11 | End: 2022-07-11 | Stop reason: SURG

## 2022-07-11 RX ORDER — SODIUM CHLORIDE, SODIUM LACTATE, POTASSIUM CHLORIDE, CALCIUM CHLORIDE 600; 310; 30; 20 MG/100ML; MG/100ML; MG/100ML; MG/100ML
30 INJECTION, SOLUTION INTRAVENOUS CONTINUOUS
Status: DISCONTINUED | OUTPATIENT
Start: 2022-07-11 | End: 2022-07-11 | Stop reason: HOSPADM

## 2022-07-11 RX ORDER — MAGNESIUM GLUCONATE 27 MG(500)
27 TABLET ORAL DAILY
COMMUNITY

## 2022-07-11 RX ADMIN — LIDOCAINE HYDROCHLORIDE 100 MG: 20 INJECTION, SOLUTION EPIDURAL; INFILTRATION; INTRACAUDAL; PERINEURAL at 09:43

## 2022-07-11 RX ADMIN — SODIUM CHLORIDE, POTASSIUM CHLORIDE, SODIUM LACTATE AND CALCIUM CHLORIDE 30 ML/HR: 600; 310; 30; 20 INJECTION, SOLUTION INTRAVENOUS at 08:27

## 2022-07-11 RX ADMIN — PROPOFOL 200 MCG/KG/MIN: 10 INJECTION, EMULSION INTRAVENOUS at 09:43

## 2022-07-11 RX ADMIN — PROPOFOL 100 MG: 10 INJECTION, EMULSION INTRAVENOUS at 09:43

## 2022-07-11 NOTE — ANESTHESIA PREPROCEDURE EVALUATION
Anesthesia Evaluation     Patient summary reviewed and Nursing notes reviewed   no history of anesthetic complications:  NPO Solid Status: > 8 hours  NPO Liquid Status: > 2 hours           Airway   Mallampati: I  TM distance: >3 FB  Neck ROM: full  No difficulty expected  Dental      Pulmonary - normal exam    breath sounds clear to auscultation  (+) shortness of breath, sleep apnea,   Cardiovascular - negative cardio ROS and normal exam  Exercise tolerance: good (4-7 METS)    Rhythm: regular  Rate: normal        Neuro/Psych- negative ROS  GI/Hepatic/Renal/Endo    (+)  GI bleeding , renal disease,     Musculoskeletal (-) negative ROS    Abdominal    Substance History - negative use     OB/GYN negative ob/gyn ROS         Other - negative ROS                     Anesthesia Plan    ASA 3     general     (Total IV Anesthesia    Patient understands anesthesia not responsible for dental damage.    Pt with hx of bradycardia with previous scopes)  intravenous induction     Anesthetic plan, risks, benefits, and alternatives have been provided, discussed and informed consent has been obtained with: patient.    Plan discussed with CRNA.        CODE STATUS:

## 2022-07-11 NOTE — H&P
Pre Procedure History & Physical    Chief Complaint:   Past history colon polyps    Subjective     HPI:   Past history colon polyps    Past Medical History:   Past Medical History:   Diagnosis Date   • Kidney stone    • Sleep apnea        Past Surgical History:  Past Surgical History:   Procedure Laterality Date   • COLONOSCOPY N/A 2/10/2022    Procedure: COLONOSCOPY with biopsies;  Surgeon: Jono Oropeza MD;  Location: [facility] ENDOSCOPY;  Service: General;  Laterality: N/A;  colon polyp, anal polyp   • COLONOSCOPY N/A 3/7/2022    Procedure: COLONOSCOPY WITH POLYPECTOMY HOT SNARE, INJECTION ORISE, ENDO CLIPS X7;  Surgeon: Zachery Mosley MD;  Location: [facility] ENDOSCOPY;  Service: Gastroenterology;  Laterality: N/A;  COLON POLYPS, DIVERTICULOSIS   • COLONOSCOPY N/A 3/13/2022    Procedure: COLONOSCOPY with clip and epi.;  Surgeon: Etienne Vyas MD;  Location: [facility] ENDOSCOPY;  Service: Gastroenterology;  Laterality: N/A;   • CYSTOSCOPY BLADDER STONE LITHOTRIPSY     • ELBOW ARTHROPLASTY      right   • ROTATOR CUFF REPAIR      right       Family History:  Family History   Problem Relation Age of Onset   • Cancer Father    • Malig Hyperthermia Neg Hx        Social History:   reports that he has never smoked. He has never used smokeless tobacco. He reports current alcohol use. He reports that he does not use drugs.    Medications:   Medications Prior to Admission   Medication Sig Dispense Refill Last Dose   • diphenhydrAMINE-APAP, sleep, (ACETAMINOPHEN PM PO) Take 1 tablet by mouth Every Night.   7/10/2022 at Unknown time   • esomeprazole (nexIUM) 40 MG capsule Take 1 capsule by mouth daily. 90 capsule 1    • ferrous gluconate (FERGON) 324 MG tablet Take 1 tablet by mouth Daily With Breakfast. 60 tablet 0 More than a month at Unknown time   • magnesium gluconate (MAGONATE) 500 MG tablet Take 27 mg by mouth Daily.          Allergies:  Patient has no known allergies.        Objective     Blood pressure  123/70, pulse (!) 48, temperature 97.1 °F (36.2 °C), temperature source Temporal, resp. rate 16, weight 92.4 kg (203 lb 11.3 oz), SpO2 96 %.    Physical Exam   Constitutional: Pt is oriented to person, place, and time and well-developed, well-nourished, and in no distress.   Mouth/Throat: Oropharynx is clear and moist.   Neck: Normal range of motion.   Cardiovascular: Normal rate, regular rhythm and normal heart sounds.    Pulmonary/Chest: Effort normal and breath sounds normal.   Abdominal: Soft. Nontender  Skin: Skin is warm and dry.   Psychiatric: Mood, memory, affect and judgment normal.     Assessment & Plan     Diagnosis:  Surveillance    Anticipated Surgical Procedure:  Colonoscopy    The risks, benefits, and alternatives of this procedure have been discussed with the patient or the responsible party- the patient understands and agrees to proceed.

## 2022-07-11 NOTE — ANESTHESIA POSTPROCEDURE EVALUATION
Patient: Ash Miller    Procedure Summary     Date: 07/11/22 Room / Location: [facility] ENDOSCOPY 4 / [facility] ENDOSCOPY    Anesthesia Start: 0940 Anesthesia Stop: 1007    Procedure: COLONOSCOPY with cold snare polypectomy, and biopsy (N/A ) Diagnosis:       Encounter for colonoscopy following colon polyp removal      (Encounter for colonoscopy following colon polyp removal [Z09, Z86.010])    Surgeons: Zachery Mosley MD Provider: Tarik Roberto MD    Anesthesia Type: general ASA Status: 3          Anesthesia Type: general    Vitals  Vitals Value Taken Time   BP 95/57 07/11/22 1026   Temp 36.1 °C (97 °F) 07/11/22 1026   Pulse 52 07/11/22 1026   Resp 13 07/11/22 1026   SpO2 97 % 07/11/22 1026           Post Anesthesia Care and Evaluation    Patient location during evaluation: bedside  Patient participation: complete - patient participated  Level of consciousness: awake  Pain score: 0  Pain management: adequate    Airway patency: patent  Anesthetic complications: No anesthetic complications  PONV Status: none  Cardiovascular status: acceptable and stable  Respiratory status: acceptable and room air  Hydration status: acceptable    Comments: An Anesthesiologist personally participated in the most demanding procedures (including induction and emergence if applicable) in the anesthesia plan, monitored the course of anesthesia administration at frequent intervals and remained physically present and available for immediate diagnosis and treatment of emergencies.

## 2023-02-15 NOTE — PROGRESS NOTES
Chief Complaint  Diego's esophagus with dysplasia    Ash Miller is a 52 y.o. male who presents to [facility]- [facility] for diego's esophagus with dysplasia    History of present Illness  Patient presents to the office for diego's esophagus. Heartburn is well controlled Nexium 40 mg daily, he cannot miss a dose or he has return of heartburn.  Denies nausea, vomiting, epigastric pain, and dysphagia.  Denies change in bowel habits, constipation, diarrhea, melena, and hematochezia.  Denies unintentional weight loss.    Colonoscopy 07/11/2022 by Dr. Mosley - 5mm polyp in ascending colon, 2 small polyp in sigmoid and descending colon, polypectomy scar - biopsies: benign. Polyps - tubular adenoma. Repeat colonoscopy in 1 year     EGD 1/18/2018 by Dr. Rg-irregular Z-line, hiatal hernia, normal stomach and duodenum.  Biopsies consistent with reflux esophagitis.    Past Medical History:   Diagnosis Date   • Diego esophagus 02/2020   • Colon polyp 2/2022   • GERD (gastroesophageal reflux disease)     3-4 years ago   • Hyperlipidemia 12/22   • Kidney stone    • Sleep apnea        Past Surgical History:   Procedure Laterality Date   • COLONOSCOPY N/A 02/10/2022    Procedure: COLONOSCOPY with biopsies;  Surgeon: Jono Oropeza MD;  Location: [facility] ENDOSCOPY;  Service: General;  Laterality: N/A;  colon polyp, anal polyp   • COLONOSCOPY N/A 03/07/2022    Procedure: COLONOSCOPY WITH POLYPECTOMY HOT SNARE, INJECTION ORISE, ENDO CLIPS X7;  Surgeon: Zachery Mosley MD;  Location: [facility] ENDOSCOPY;  Service: Gastroenterology;  Laterality: N/A;  COLON POLYPS, DIVERTICULOSIS   • COLONOSCOPY N/A 03/13/2022    Procedure: COLONOSCOPY with clip and epi.;  Surgeon: Etienne Vyas MD;  Location: [facility] ENDOSCOPY;  Service: Gastroenterology;  Laterality: N/A;   • COLONOSCOPY N/A 07/11/2022    Procedure: COLONOSCOPY with cold snare polypectomy, and biopsy;  Surgeon: Zachery Mosley  "MD Yoav;  Location: [facility] ENDOSCOPY;  Service: Gastroenterology;  Laterality: N/A;  colon polyps   • CYSTOSCOPY BLADDER STONE LITHOTRIPSY     • ELBOW ARTHROPLASTY      right   • ROTATOR CUFF REPAIR      right   • UPPER GASTROINTESTINAL ENDOSCOPY           Current Outpatient Medications:   •  atorvastatin (LIPITOR) 10 MG tablet, Take 1 tablet by mouth nightly., Disp: 90 tablet, Rfl: 1  •  esomeprazole (nexIUM) 40 MG capsule, Take 1 capsule by mouth daily., Disp: 90 capsule, Rfl: 0  •  diphenhydrAMINE-APAP, sleep, (ACETAMINOPHEN PM PO), Take 1 tablet by mouth Every Night., Disp: , Rfl:   •  magnesium gluconate (MAGONATE) 500 MG tablet, Take 27 mg by mouth Daily., Disp: , Rfl:   •  promethazine-dextromethorphan (PROMETHAZINE-DM) 6.25-15 MG/5ML syrup, Take 5 mL by mouth 4 (Four) Times a Day As Needed for Cough., Disp: 100 mL, Rfl: 0  •  Sod Picosulfate-Mag Ox-Cit Acd (Clenpiq) 10-3.5-12 MG-GM -GM/160ML solution, Take 160 mL by mouth Take As Directed., Disp: 320 mL, Rfl: 0     No Known Allergies    Family History   Problem Relation Age of Onset   • Cancer Father    • Colon cancer Father 78   • Colon polyps Father    • Malig Hyperthermia Neg Hx         Social History     Social History Narrative   • Not on file       Objective       Vital Signs:   /64 (BP Location: Left arm, Patient Position: Sitting, Cuff Size: Adult)   Pulse 57   Ht 172.7 cm (67.99\")   Wt 97.4 kg (214 lb 12.8 oz)   SpO2 99%   BMI 32.67 kg/m²       Physical Exam  Constitutional:       Appearance: Normal appearance.   HENT:      Head: Normocephalic.   Cardiovascular:      Rate and Rhythm: Normal rate and regular rhythm.      Heart sounds: Normal heart sounds.   Pulmonary:      Effort: Pulmonary effort is normal.      Breath sounds: Normal breath sounds.   Abdominal:      General: Bowel sounds are normal.      Palpations: Abdomen is soft.   Skin:     General: Skin is warm and dry.   Neurological:      Mental Status: He is alert and oriented " to person, place, and time. Mental status is at baseline.   Psychiatric:         Mood and Affect: Mood normal.         Behavior: Behavior normal.         Thought Content: Thought content normal.         Judgment: Judgment normal.         Result Review :       CBC w/diff    CBC w/Diff 3/13/22 3/13/22 3/14/22 3/22/22    0504 1319     WBC 6.25   8.05   RBC 4.08 (A)   3.21 (A)   Hemoglobin 12.6 (A) 12.4 (A) 10.1 (A) 9.9 (A)   Hematocrit 36.4 (A) 35.7 (A) 28.7 (A) 29.4 (A)   MCV 89.2   91.6   MCH 30.9   30.8   MCHC 34.6   33.7   RDW 13.0   13.7   Platelets 224   374   Neutrophil Rel % 41.3 (A)   56.5   Immature Granulocyte Rel % 0.2   0.9   Lymphocyte Rel % 47.0 (A)   33.4   Monocyte Rel % 8.6   7.3   Eosinophil Rel % 2.6   1.5   Basophil Rel % 0.3   0.4   (A) Abnormal value            CMP    CMP 3/12/22 3/13/22   Glucose 99 97   BUN 14 16   Creatinine 0.92 0.98   eGFR 100.7 93.4   Sodium 142 139   Potassium 4.2 4.0   Chloride 106 106   Calcium 9.6 9.2   Total Protein 7.1    Albumin 4.40    Globulin 2.7    Total Bilirubin 0.2    Alkaline Phosphatase 86    AST (SGOT) 28    ALT (SGPT) 38    Albumin/Globulin Ratio 1.6    BUN/Creatinine Ratio 15.2 16.3   Anion Gap 9.9 7.6      Comments are available for some flowsheets but are not being displayed.                   Assessment and Plan    Diagnoses and all orders for this visit:    1. Gastroesophageal reflux disease without esophagitis (Primary)    2. History of colon polyps    3. Encounter for screening for malignant neoplasm of colon    Other orders  -     Discontinue: PEG 3350-KCl-NaBcb-NaCl-NaSulf (Golytely) 227.1 g pack; Take 4,000 mL by mouth 1 (One) Time for 1 dose. Take as directed by the office for colon prep  Dispense: 1 each; Refill: 0  -     Sod Picosulfate-Mag Ox-Cit Acd (Clenpiq) 10-3.5-12 MG-GM -GM/160ML solution; Take 160 mL by mouth Take As Directed.  Dispense: 320 mL; Refill: 0    Continue Nexium 40mg daily.   Denies cardiopulmonary history  EGD/COLONOSCOPY  Surgical Risk and Benefits: Possible risk/complications, benefits, and alternatives to surgical or invasive procedure have been explained to patient and/or legal guardian. Risks include bleeding, infection, and perforation. Patient has been evaluated and can tolerate anesthesia and/or sedation. Risk, benefits, and alternatives to anesthesia and sedation have been explained to patient and/or legal guardian.     Follow Up   No follow-ups on file.  Patient was given instructions and counseling regarding his condition or for health maintenance advice. Please see specific information pulled into the AVS if appropriate.

## 2023-02-16 ENCOUNTER — OFFICE VISIT (OUTPATIENT)
Dept: GASTROENTEROLOGY | Facility: CLINIC | Age: 53
End: 2023-02-16
Payer: COMMERCIAL

## 2023-02-16 ENCOUNTER — PREP FOR SURGERY (OUTPATIENT)
Dept: OTHER | Facility: HOSPITAL | Age: 53
End: 2023-02-16
Payer: COMMERCIAL

## 2023-02-16 VITALS
WEIGHT: 214.8 LBS | DIASTOLIC BLOOD PRESSURE: 64 MMHG | HEART RATE: 57 BPM | OXYGEN SATURATION: 99 % | HEIGHT: 68 IN | SYSTOLIC BLOOD PRESSURE: 149 MMHG | BODY MASS INDEX: 32.55 KG/M2

## 2023-02-16 DIAGNOSIS — K21.9 GASTROESOPHAGEAL REFLUX DISEASE WITHOUT ESOPHAGITIS: Primary | ICD-10-CM

## 2023-02-16 DIAGNOSIS — Z86.010 HISTORY OF COLON POLYPS: ICD-10-CM

## 2023-02-16 DIAGNOSIS — Z12.11 ENCOUNTER FOR SCREENING FOR MALIGNANT NEOPLASM OF COLON: ICD-10-CM

## 2023-02-16 PROCEDURE — 99214 OFFICE O/P EST MOD 30 MIN: CPT

## 2023-02-16 RX ORDER — POLYETHYLENE GLYCOL 3350, SODIUM SULFATE ANHYDROUS, SODIUM BICARBONATE, SODIUM CHLORIDE, POTASSIUM CHLORIDE 227.1; 21.5; 6.36; 5.53; .754 G/L; G/L; G/L; G/L; G/L
4000 POWDER, FOR SOLUTION ORAL ONCE
Qty: 1 EACH | Refills: 0 | Status: SHIPPED | OUTPATIENT
Start: 2023-02-16 | End: 2023-02-16 | Stop reason: ALTCHOICE

## 2023-02-16 RX ORDER — SODIUM PICOSULFATE, MAGNESIUM OXIDE, AND ANHYDROUS CITRIC ACID 10; 3.5; 12 MG/160ML; G/160ML; G/160ML
1 LIQUID ORAL TAKE AS DIRECTED
Qty: 320 ML | Refills: 0 | Status: SHIPPED | OUTPATIENT
Start: 2023-02-16 | End: 2023-02-28 | Stop reason: SDUPTHER

## 2023-02-23 ENCOUNTER — TELEPHONE (OUTPATIENT)
Dept: GASTROENTEROLOGY | Facility: CLINIC | Age: 53
End: 2023-02-23
Payer: COMMERCIAL

## 2023-02-23 NOTE — TELEPHONE ENCOUNTER
PT SPOUSE CALLED TO ASK ABOUT BOWEL PREP -LOW VOLUME PREP WAS SUPPOSED TO BE SENT TO PHARM -SPOKE WITH JEMMA VIA SECURE CHAT - OKAYED CLENPIQ SAMPLE. SPOKE TO PT WIFE TO ADVISE SAMPLE WILL BE AT

## 2023-02-28 ENCOUNTER — TELEPHONE (OUTPATIENT)
Dept: GASTROENTEROLOGY | Facility: CLINIC | Age: 53
End: 2023-02-28
Payer: COMMERCIAL

## 2023-02-28 RX ORDER — SODIUM PICOSULFATE, MAGNESIUM OXIDE, AND ANHYDROUS CITRIC ACID 10; 3.5; 12 MG/160ML; G/160ML; G/160ML
1 LIQUID ORAL 2 TIMES DAILY
Qty: 320 ML | Refills: 0 | COMMUNITY
Start: 2023-02-28 | End: 2023-03-01

## 2023-03-13 PROCEDURE — U0004 COV-19 TEST NON-CDC HGH THRU: HCPCS | Performed by: NURSE PRACTITIONER

## 2023-03-14 ENCOUNTER — TELEPHONE (OUTPATIENT)
Dept: URGENT CARE | Facility: CLINIC | Age: 53
End: 2023-03-14
Payer: COMMERCIAL

## 2023-03-14 DIAGNOSIS — U07.1 COVID-19: Primary | ICD-10-CM

## 2023-03-14 NOTE — TELEPHONE ENCOUNTER
Spoke with the patient via telephone and verified the patient's name, date of birth, street address.  Notified the patient that they are positive for COVID.  Discussed quarantine, symptomatic management, ER precautions with the patient.  All questions answered and patient verbalized understanding of information.

## 2023-04-20 ENCOUNTER — PREP FOR SURGERY (OUTPATIENT)
Dept: OTHER | Facility: HOSPITAL | Age: 53
End: 2023-04-20
Payer: COMMERCIAL

## 2023-08-17 ENCOUNTER — TRANSCRIBE ORDERS (OUTPATIENT)
Dept: ADMINISTRATIVE | Facility: HOSPITAL | Age: 53
End: 2023-08-17
Payer: COMMERCIAL

## 2023-08-17 ENCOUNTER — LAB (OUTPATIENT)
Dept: LAB | Facility: HOSPITAL | Age: 53
End: 2023-08-17
Payer: COMMERCIAL

## 2023-08-17 DIAGNOSIS — E78.2 MIXED HYPERLIPIDEMIA: ICD-10-CM

## 2023-08-17 DIAGNOSIS — Z79.899 ENCOUNTER FOR LONG-TERM (CURRENT) USE OF OTHER MEDICATIONS: ICD-10-CM

## 2023-08-17 DIAGNOSIS — R53.83 OTHER FATIGUE: ICD-10-CM

## 2023-08-17 DIAGNOSIS — R53.83 OTHER FATIGUE: Primary | ICD-10-CM

## 2023-08-17 LAB
ALBUMIN SERPL-MCNC: 4.2 G/DL (ref 3.5–5.2)
ALBUMIN/GLOB SERPL: 1.8 G/DL
ALP SERPL-CCNC: 69 U/L (ref 39–117)
ALT SERPL W P-5'-P-CCNC: 26 U/L (ref 1–41)
ANION GAP SERPL CALCULATED.3IONS-SCNC: 8.8 MMOL/L (ref 5–15)
AST SERPL-CCNC: 24 U/L (ref 1–40)
BASOPHILS # BLD AUTO: 0.04 10*3/MM3 (ref 0–0.2)
BASOPHILS NFR BLD AUTO: 0.6 % (ref 0–1.5)
BILIRUB SERPL-MCNC: <0.2 MG/DL (ref 0–1.2)
BUN SERPL-MCNC: 19 MG/DL (ref 6–20)
BUN/CREAT SERPL: 19 (ref 7–25)
CALCIUM SPEC-SCNC: 9.1 MG/DL (ref 8.6–10.5)
CHLORIDE SERPL-SCNC: 108 MMOL/L (ref 98–107)
CHOLEST SERPL-MCNC: 137 MG/DL (ref 0–200)
CO2 SERPL-SCNC: 24.2 MMOL/L (ref 22–29)
CREAT SERPL-MCNC: 1 MG/DL (ref 0.76–1.27)
DEPRECATED RDW RBC AUTO: 43.2 FL (ref 37–54)
EGFRCR SERPLBLD CKD-EPI 2021: 90.6 ML/MIN/1.73
EOSINOPHIL # BLD AUTO: 0.16 10*3/MM3 (ref 0–0.4)
EOSINOPHIL NFR BLD AUTO: 2.3 % (ref 0.3–6.2)
ERYTHROCYTE [DISTWIDTH] IN BLOOD BY AUTOMATED COUNT: 12.6 % (ref 12.3–15.4)
GLOBULIN UR ELPH-MCNC: 2.4 GM/DL
GLUCOSE SERPL-MCNC: 103 MG/DL (ref 65–99)
HCT VFR BLD AUTO: 43.8 % (ref 37.5–51)
HDLC SERPL-MCNC: 31 MG/DL (ref 40–60)
HGB BLD-MCNC: 14.6 G/DL (ref 13–17.7)
IMM GRANULOCYTES # BLD AUTO: 0.02 10*3/MM3 (ref 0–0.05)
IMM GRANULOCYTES NFR BLD AUTO: 0.3 % (ref 0–0.5)
LDLC SERPL CALC-MCNC: 77 MG/DL (ref 0–100)
LDLC/HDLC SERPL: 2.32 {RATIO}
LYMPHOCYTES # BLD AUTO: 2.65 10*3/MM3 (ref 0.7–3.1)
LYMPHOCYTES NFR BLD AUTO: 38.4 % (ref 19.6–45.3)
MCH RBC QN AUTO: 31 PG (ref 26.6–33)
MCHC RBC AUTO-ENTMCNC: 33.3 G/DL (ref 31.5–35.7)
MCV RBC AUTO: 93 FL (ref 79–97)
MONOCYTES # BLD AUTO: 0.7 10*3/MM3 (ref 0.1–0.9)
MONOCYTES NFR BLD AUTO: 10.1 % (ref 5–12)
NEUTROPHILS NFR BLD AUTO: 3.34 10*3/MM3 (ref 1.7–7)
NEUTROPHILS NFR BLD AUTO: 48.3 % (ref 42.7–76)
NRBC BLD AUTO-RTO: 0 /100 WBC (ref 0–0.2)
PLATELET # BLD AUTO: 236 10*3/MM3 (ref 140–450)
PMV BLD AUTO: 11 FL (ref 6–12)
POTASSIUM SERPL-SCNC: 4 MMOL/L (ref 3.5–5.2)
PROT SERPL-MCNC: 6.6 G/DL (ref 6–8.5)
RBC # BLD AUTO: 4.71 10*6/MM3 (ref 4.14–5.8)
SODIUM SERPL-SCNC: 141 MMOL/L (ref 136–145)
TRIGL SERPL-MCNC: 170 MG/DL (ref 0–150)
VLDLC SERPL-MCNC: 29 MG/DL (ref 5–40)
WBC NRBC COR # BLD: 6.91 10*3/MM3 (ref 3.4–10.8)

## 2023-08-17 PROCEDURE — 84402 ASSAY OF FREE TESTOSTERONE: CPT

## 2023-08-17 PROCEDURE — 80053 COMPREHEN METABOLIC PANEL: CPT

## 2023-08-17 PROCEDURE — 84270 ASSAY OF SEX HORMONE GLOBUL: CPT

## 2023-08-17 PROCEDURE — 84403 ASSAY OF TOTAL TESTOSTERONE: CPT

## 2023-08-17 PROCEDURE — 36415 COLL VENOUS BLD VENIPUNCTURE: CPT

## 2023-08-17 PROCEDURE — 85025 COMPLETE CBC W/AUTO DIFF WBC: CPT

## 2023-08-17 PROCEDURE — 80061 LIPID PANEL: CPT

## 2023-08-26 LAB
SHBG SERPL-SCNC: 26.3 NMOL/L (ref 19.3–76.4)
TESTOST FREE SERPL-MCNC: 3.4 PG/ML (ref 7.2–24)
TESTOST SERPL-MCNC: 337 NG/DL (ref 264–916)

## 2023-12-29 ENCOUNTER — LAB (OUTPATIENT)
Dept: LAB | Facility: HOSPITAL | Age: 53
End: 2023-12-29
Payer: COMMERCIAL

## 2023-12-29 ENCOUNTER — TRANSCRIBE ORDERS (OUTPATIENT)
Dept: ADMINISTRATIVE | Facility: HOSPITAL | Age: 53
End: 2023-12-29
Payer: COMMERCIAL

## 2023-12-29 DIAGNOSIS — Z12.5 SPECIAL SCREENING FOR MALIGNANT NEOPLASM OF PROSTATE: ICD-10-CM

## 2023-12-29 DIAGNOSIS — Z79.899 ENCOUNTER FOR LONG-TERM (CURRENT) USE OF OTHER MEDICATIONS: Primary | ICD-10-CM

## 2023-12-29 DIAGNOSIS — Z79.899 ENCOUNTER FOR LONG-TERM (CURRENT) USE OF OTHER MEDICATIONS: ICD-10-CM

## 2023-12-29 DIAGNOSIS — E78.2 MIXED HYPERLIPIDEMIA: ICD-10-CM

## 2023-12-29 LAB
ALBUMIN SERPL-MCNC: 4.4 G/DL (ref 3.5–5.2)
ALBUMIN/GLOB SERPL: 1.5 G/DL
ALP SERPL-CCNC: 76 U/L (ref 39–117)
ALT SERPL W P-5'-P-CCNC: 59 U/L (ref 1–41)
ANION GAP SERPL CALCULATED.3IONS-SCNC: 10.5 MMOL/L (ref 5–15)
AST SERPL-CCNC: 40 U/L (ref 1–40)
BASOPHILS # BLD AUTO: 0.04 10*3/MM3 (ref 0–0.2)
BASOPHILS NFR BLD AUTO: 0.6 % (ref 0–1.5)
BILIRUB SERPL-MCNC: 0.3 MG/DL (ref 0–1.2)
BUN SERPL-MCNC: 17 MG/DL (ref 6–20)
BUN/CREAT SERPL: 16 (ref 7–25)
CALCIUM SPEC-SCNC: 9.6 MG/DL (ref 8.6–10.5)
CHLORIDE SERPL-SCNC: 105 MMOL/L (ref 98–107)
CHOLEST SERPL-MCNC: 139 MG/DL (ref 0–200)
CO2 SERPL-SCNC: 24.5 MMOL/L (ref 22–29)
CREAT SERPL-MCNC: 1.06 MG/DL (ref 0.76–1.27)
DEPRECATED RDW RBC AUTO: 39 FL (ref 37–54)
EGFRCR SERPLBLD CKD-EPI 2021: 83.9 ML/MIN/1.73
EOSINOPHIL # BLD AUTO: 0.13 10*3/MM3 (ref 0–0.4)
EOSINOPHIL NFR BLD AUTO: 1.9 % (ref 0.3–6.2)
ERYTHROCYTE [DISTWIDTH] IN BLOOD BY AUTOMATED COUNT: 12.1 % (ref 12.3–15.4)
GLOBULIN UR ELPH-MCNC: 2.9 GM/DL
GLUCOSE SERPL-MCNC: 96 MG/DL (ref 65–99)
HCT VFR BLD AUTO: 45.9 % (ref 37.5–51)
HDLC SERPL-MCNC: 39 MG/DL (ref 40–60)
HGB BLD-MCNC: 15.1 G/DL (ref 13–17.7)
IMM GRANULOCYTES # BLD AUTO: 0.02 10*3/MM3 (ref 0–0.05)
IMM GRANULOCYTES NFR BLD AUTO: 0.3 % (ref 0–0.5)
LDLC SERPL CALC-MCNC: 80 MG/DL (ref 0–100)
LDLC/HDLC SERPL: 2 {RATIO}
LYMPHOCYTES # BLD AUTO: 2.65 10*3/MM3 (ref 0.7–3.1)
LYMPHOCYTES NFR BLD AUTO: 39.1 % (ref 19.6–45.3)
MCH RBC QN AUTO: 29.4 PG (ref 26.6–33)
MCHC RBC AUTO-ENTMCNC: 32.9 G/DL (ref 31.5–35.7)
MCV RBC AUTO: 89.3 FL (ref 79–97)
MONOCYTES # BLD AUTO: 0.62 10*3/MM3 (ref 0.1–0.9)
MONOCYTES NFR BLD AUTO: 9.1 % (ref 5–12)
NEUTROPHILS NFR BLD AUTO: 3.32 10*3/MM3 (ref 1.7–7)
NEUTROPHILS NFR BLD AUTO: 49 % (ref 42.7–76)
NRBC BLD AUTO-RTO: 0 /100 WBC (ref 0–0.2)
PLATELET # BLD AUTO: 263 10*3/MM3 (ref 140–450)
PMV BLD AUTO: 10.4 FL (ref 6–12)
POTASSIUM SERPL-SCNC: 4.5 MMOL/L (ref 3.5–5.2)
PROT SERPL-MCNC: 7.3 G/DL (ref 6–8.5)
PSA SERPL-MCNC: 1.73 NG/ML (ref 0–4)
RBC # BLD AUTO: 5.14 10*6/MM3 (ref 4.14–5.8)
SODIUM SERPL-SCNC: 140 MMOL/L (ref 136–145)
TRIGL SERPL-MCNC: 110 MG/DL (ref 0–150)
VLDLC SERPL-MCNC: 20 MG/DL (ref 5–40)
WBC NRBC COR # BLD AUTO: 6.78 10*3/MM3 (ref 3.4–10.8)

## 2023-12-29 PROCEDURE — 36415 COLL VENOUS BLD VENIPUNCTURE: CPT

## 2023-12-29 PROCEDURE — 80061 LIPID PANEL: CPT

## 2023-12-29 PROCEDURE — G0103 PSA SCREENING: HCPCS

## 2023-12-29 PROCEDURE — 85025 COMPLETE CBC W/AUTO DIFF WBC: CPT

## 2023-12-29 PROCEDURE — 80053 COMPREHEN METABOLIC PANEL: CPT

## 2024-01-02 ENCOUNTER — TELEPHONE (OUTPATIENT)
Dept: SLEEP MEDICINE | Facility: HOSPITAL | Age: 54
End: 2024-01-02
Payer: COMMERCIAL

## 2024-01-08 NOTE — PRE-PROCEDURE INSTRUCTIONS
"Instructed on date and arrival time of 0600. Come to entrance \"C\". Must have  over age 18 to drive home.  May have two visitors; however, children under 12 must stay in waiting room.  Discussed clear liquid diet (no red or purple), bowel prep, and NPO.  May take medications as usual except for blood thinners, diabetic medications, and weight loss medications.  Bring list of medications.  Verbalized understanding of instructions given.  Instructed to call for questions or concerns.  Not taking Saxenda or Wegovy.  "

## 2024-01-11 ENCOUNTER — ANESTHESIA EVENT (OUTPATIENT)
Dept: GASTROENTEROLOGY | Facility: HOSPITAL | Age: 54
End: 2024-01-11
Payer: COMMERCIAL

## 2024-01-11 NOTE — ANESTHESIA PREPROCEDURE EVALUATION
Anesthesia Evaluation     History of anesthetic complications: No ans complications, reports has become bradycardic during previous endoscopies.  NPO Solid Status: > 8 hours  NPO Liquid Status: > 2 hours           Airway   Mallampati: II  TM distance: >3 FB  No difficulty expected  Dental - normal exam     Pulmonary - normal exam   (+) ,sleep apnea  Cardiovascular - normal exam  Exercise tolerance: good (4-7 METS)    ECG reviewed    (+) hyperlipidemia      Neuro/Psych  GI/Hepatic/Renal/Endo    (+) GERD, renal disease- stones    Musculoskeletal     Abdominal    Substance History      OB/GYN          Other        ROS/Med Hx Other: Wegovy prescribed but never started (unable to obtain)    EKG 09/08/21: HR 60,   Sinus rhythm  Nonspecific intraventricular conduction delay  Nonspecific T abnormalities, anterior leads    ECHO : 08/03/21:   Left Ventricle Calculated left ventricular EF = 66%                            Anesthesia Plan    ASA 2     general   total IV anesthesia    Anesthetic plan, risks, benefits, and alternatives have been provided, discussed and informed consent has been obtained with: patient, spouse/significant other and other.  Pre-procedure education provided  Plan discussed with CRNA.        CODE STATUS:

## 2024-01-12 ENCOUNTER — ANESTHESIA (OUTPATIENT)
Dept: GASTROENTEROLOGY | Facility: HOSPITAL | Age: 54
End: 2024-01-12
Payer: COMMERCIAL

## 2024-01-12 ENCOUNTER — HOSPITAL ENCOUNTER (OUTPATIENT)
Facility: HOSPITAL | Age: 54
Setting detail: HOSPITAL OUTPATIENT SURGERY
Discharge: HOME | End: 2024-01-12
Attending: INTERNAL MEDICINE | Admitting: INTERNAL MEDICINE
Payer: COMMERCIAL

## 2024-01-12 VITALS
HEART RATE: 57 BPM | BODY MASS INDEX: 31.64 KG/M2 | RESPIRATION RATE: 25 BRPM | DIASTOLIC BLOOD PRESSURE: 72 MMHG | OXYGEN SATURATION: 97 % | SYSTOLIC BLOOD PRESSURE: 102 MMHG | TEMPERATURE: 97.1 F | WEIGHT: 208.11 LBS

## 2024-01-12 DIAGNOSIS — Z86.010 PERSONAL HISTORY OF COLONIC POLYPS: ICD-10-CM

## 2024-01-12 DIAGNOSIS — Z86.010 HISTORY OF COLON POLYPS: ICD-10-CM

## 2024-01-12 DIAGNOSIS — Z86.010 ENCOUNTER FOR COLONOSCOPY FOLLOWING COLON POLYP REMOVAL: ICD-10-CM

## 2024-01-12 DIAGNOSIS — Z09 ENCOUNTER FOR COLONOSCOPY FOLLOWING COLON POLYP REMOVAL: ICD-10-CM

## 2024-01-12 PROCEDURE — 25010000002 PROPOFOL 10 MG/ML EMULSION: Performed by: NURSE ANESTHETIST, CERTIFIED REGISTERED

## 2024-01-12 PROCEDURE — 88305 TISSUE EXAM BY PATHOLOGIST: CPT | Performed by: INTERNAL MEDICINE

## 2024-01-12 PROCEDURE — 25810000003 LACTATED RINGERS PER 1000 ML

## 2024-01-12 PROCEDURE — 45385 COLONOSCOPY W/LESION REMOVAL: CPT | Performed by: INTERNAL MEDICINE

## 2024-01-12 PROCEDURE — 45380 COLONOSCOPY AND BIOPSY: CPT | Performed by: INTERNAL MEDICINE

## 2024-01-12 RX ORDER — PROPOFOL 10 MG/ML
VIAL (ML) INTRAVENOUS AS NEEDED
Status: DISCONTINUED | OUTPATIENT
Start: 2024-01-12 | End: 2024-01-12 | Stop reason: SURG

## 2024-01-12 RX ORDER — SODIUM CHLORIDE, SODIUM LACTATE, POTASSIUM CHLORIDE, CALCIUM CHLORIDE 600; 310; 30; 20 MG/100ML; MG/100ML; MG/100ML; MG/100ML
30 INJECTION, SOLUTION INTRAVENOUS CONTINUOUS
Status: DISCONTINUED | OUTPATIENT
Start: 2024-01-12 | End: 2024-01-12 | Stop reason: HOSPADM

## 2024-01-12 RX ORDER — LIDOCAINE HYDROCHLORIDE 20 MG/ML
INJECTION, SOLUTION EPIDURAL; INFILTRATION; INTRACAUDAL; PERINEURAL AS NEEDED
Status: DISCONTINUED | OUTPATIENT
Start: 2024-01-12 | End: 2024-01-12 | Stop reason: SURG

## 2024-01-12 RX ADMIN — SODIUM CHLORIDE, POTASSIUM CHLORIDE, SODIUM LACTATE AND CALCIUM CHLORIDE 30 ML/HR: 600; 310; 30; 20 INJECTION, SOLUTION INTRAVENOUS at 06:36

## 2024-01-12 RX ADMIN — PROPOFOL 100 MG: 10 INJECTION, EMULSION INTRAVENOUS at 07:38

## 2024-01-12 RX ADMIN — LIDOCAINE HYDROCHLORIDE 100 MG: 20 INJECTION, SOLUTION EPIDURAL; INFILTRATION; INTRACAUDAL; PERINEURAL at 07:38

## 2024-01-12 RX ADMIN — PROPOFOL 250 MCG/KG/MIN: 10 INJECTION, EMULSION INTRAVENOUS at 07:38

## 2024-01-12 NOTE — ANESTHESIA POSTPROCEDURE EVALUATION
Patient: Ash Miller    Procedure Summary       Date: 01/12/24 Room / Location: [facility] ENDOSCOPY 4 / [facility] ENDOSCOPY    Anesthesia Start: 0736 Anesthesia Stop: 0808    Procedure: COLONOSCOPY WITH BIOPSIES, POLYPECTOMY Diagnosis:       History of colon polyps      Personal history of colonic polyps      Encounter for colonoscopy following colon polyp removal      (History of colon polyps [Z86.010])      (Personal history of colonic polyps [Z86.010])      (Encounter for colonoscopy following colon polyp removal [Z09, Z86.010])    Surgeons: Zachery Mosley MD Provider: Ankur Calloway CRNA    Anesthesia Type: general ASA Status: 2            Anesthesia Type: general    Vitals  Vitals Value Taken Time   /72 01/12/24 0824   Temp 36.2 °C (97.1 °F) 01/12/24 0804   Pulse 57 01/12/24 0824   Resp 25 01/12/24 0824   SpO2 97 % 01/12/24 0824           Post Anesthesia Care and Evaluation    Patient location during evaluation: bedside  Patient participation: complete - patient participated  Level of consciousness: awake  Pain management: adequate    Airway patency: patent  Anesthetic complications: No anesthetic complications  PONV Status: controlled  Cardiovascular status: acceptable and stable  Respiratory status: acceptable

## 2024-01-12 NOTE — H&P
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    Bradycardia     USUALLY RUNS 40'S-50'S; WITH ANESTHESIA HAS DROPPED IN 30'S    Colon polyp 2/2022    Colonoscopy causing post-procedural bleeding 2023    BLEEDING 1 WEEK AFTER REMOVAL OF 40MM COLON POLYP    GERD (gastroesophageal reflux disease)     3-4 years ago    Hyperlipidemia 12/22    Kidney stone     Sleep apnea        Past Surgical History:  Past Surgical History:   Procedure Laterality Date    COLONOSCOPY N/A 02/10/2022    Procedure: COLONOSCOPY with biopsies;  Surgeon: Jono Oropeza MD;  Location: [facility] ENDOSCOPY;  Service: General;  Laterality: N/A;  colon polyp, anal polyp    COLONOSCOPY N/A 03/07/2022    Procedure: COLONOSCOPY WITH POLYPECTOMY HOT SNARE, INJECTION ORISE, ENDO CLIPS X7;  Surgeon: Zachery Mosley MD;  Location: [facility] ENDOSCOPY;  Service: Gastroenterology;  Laterality: N/A;  COLON POLYPS, DIVERTICULOSIS    COLONOSCOPY N/A 03/13/2022    Procedure: COLONOSCOPY with clip and epi.;  Surgeon: Etienne Vyas MD;  Location: [facility] ENDOSCOPY;  Service: Gastroenterology;  Laterality: N/A;    COLONOSCOPY N/A 07/11/2022    Procedure: COLONOSCOPY with cold snare polypectomy, and biopsy;  Surgeon: Zachery Mosley MD;  Location: [facility] ENDOSCOPY;  Service: Gastroenterology;  Laterality: N/A;  colon polyps    COLONOSCOPY N/A 7/17/2023    Procedure: COLONOSCOPY WITH ELEVIEW INJECTION AND COLD SNARE/HOT FORCEPS POLYPECTOMIES;  Surgeon: Zachery Mosley MD;  Location: [facility] ENDOSCOPY;  Service: Gastroenterology;  Laterality: N/A;  POLYPS    CYSTOSCOPY BLADDER STONE LITHOTRIPSY      ELBOW ARTHROPLASTY      right    ENDOSCOPY N/A 7/17/2023    Procedure: ESOPHAGOGASTRODUODENOSCOPY WITH COLD BIOPSIES;  Surgeon: Zachery Mosley MD;  Location: [facility]  ENDOSCOPY;  Service: Gastroenterology;  Laterality: N/A;  IRREGULAR Z LINE    ROTATOR CUFF REPAIR      right    UPPER GASTROINTESTINAL ENDOSCOPY         Family History:  Family History   Problem Relation Age of Onset    Cancer Father     Colon cancer Father 78    Colon polyps Father     Malig Hyperthermia Neg Hx        Social History:   reports that he has never smoked. He has never been exposed to tobacco smoke. He has never used smokeless tobacco. He reports current alcohol use of about 2.0 standard drinks of alcohol per week. He reports that he does not use drugs.    Medications:   Medications Prior to Admission   Medication Sig Dispense Refill Last Dose    atorvastatin (LIPITOR) 10 MG tablet Take 1 tablet by mouth nightly. 90 tablet 1 Past Week    diphenhydrAMINE-APAP, sleep, (ACETAMINOPHEN PM PO) Take 1 tablet by mouth Every Night.   Past Week    esomeprazole (nexIUM) 40 MG capsule Take 1 capsule by mouth daily. 90 capsule 1 1/11/2024    Liraglutide (Saxenda) 18 MG/3ML injection pen Inject 3 mg into the skin daily. 45 mL 0        Allergies:  Patient has no known allergies.        Objective     Blood pressure 116/83, pulse 54, temperature 97.6 °F (36.4 °C), temperature source Temporal, resp. rate 16, weight 94.4 kg (208 lb 1.8 oz), SpO2 95%.    Physical Exam   Constitutional: Pt is oriented to person, place, and time and well-developed, well-nourished, and in no distress.   Mouth/Throat: Oropharynx is clear and moist.   Neck: Normal range of motion.   Cardiovascular: Normal rate, regular rhythm and normal heart sounds.    Pulmonary/Chest: Effort normal and breath sounds normal.   Abdominal: Soft. Nontender  Skin: Skin is warm and dry.   Psychiatric: Mood, memory, affect and judgment normal.     Assessment & Plan     Diagnosis:  Surveillance    Anticipated Surgical Procedure:  Colonoscopy    The risks, benefits, and alternatives of this procedure have been discussed with the patient or the responsible party- the  patient understands and agrees to proceed.

## 2024-01-22 ENCOUNTER — OFFICE VISIT (OUTPATIENT)
Dept: SLEEP MEDICINE | Facility: HOSPITAL | Age: 54
End: 2024-01-22
Payer: COMMERCIAL

## 2024-01-22 VITALS
OXYGEN SATURATION: 98 % | WEIGHT: 215 LBS | HEART RATE: 65 BPM | DIASTOLIC BLOOD PRESSURE: 66 MMHG | SYSTOLIC BLOOD PRESSURE: 124 MMHG | BODY MASS INDEX: 32.58 KG/M2 | HEIGHT: 68 IN

## 2024-01-22 DIAGNOSIS — E66.9 CLASS 1 OBESITY: ICD-10-CM

## 2024-01-22 DIAGNOSIS — G47.33 OSA ON CPAP: Primary | ICD-10-CM

## 2024-01-22 PROBLEM — E66.811 CLASS 1 OBESITY: Status: ACTIVE | Noted: 2024-01-22

## 2024-01-22 PROCEDURE — G0463 HOSPITAL OUTPT CLINIC VISIT: HCPCS

## 2024-01-22 PROCEDURE — 99213 OFFICE O/P EST LOW 20 MIN: CPT | Performed by: INTERNAL MEDICINE

## 2024-01-22 NOTE — PROGRESS NOTES
"  [address] [address]  [place]   [place] [address]  Phone: [phone]  Fax: [phone]      SLEEP CLINIC FOLLOW UP PROGRESS NOTE.    Ash Miller  [id number]   1970  53 y.o.  male      PCP: Rocio Fernandez APRN      Date of visit: 1/22/2024    Chief Complaint   Patient presents with    Sleep Apnea    Obesity       HPI:  This is a 53 y.o. years old patient is here for the management of obstructive sleep apnea.  Sleep apnea is severe in severity with a AHI of 32/hr. Patient is using positive airway pressure therapy with auto CPAP and the symptoms of sleep apnea have improved significantly on the therapy.. Normally patient goes to bed at 10 PM and wakes up at 530 AM .  The patient wakes up 3 time(s) during the night and has no problem going back to sleep.  Patient states that he is still has chronic fatigue.  He is a     Medications and allergies are reviewed by me and documented in the encounter.     SOCIAL (habits pertaining to sleep medicine)  History tobacco use:No   History of alcohol use: 0 per week  Caffeine use: 1     REVIEW OF SYSTEMS:   Pertaining positive symptoms are:  [place] Sleepiness Scale :Total score: 7   Chronic fatigue      PHYSICAL EXAMINATION:  CONSTITUTIONAL:  Vitals:    01/22/24 1500   BP: 124/66   Pulse: 65   SpO2: 98%   Weight: 97.5 kg (215 lb)   Height: 172.7 cm (67.99\")    Body mass index is 32.7 kg/m².   NOSE: nasal passages are clear, No deformities noted   RESP SYSTEM: Not in any respiratory distress, no chest deformities noted,   CARDIOVASULAR: No edema noted  NEURO: Oriented x 3, gait normal,  Mood and affect appeared appropriate      Data reviewed:  The Smart card downloaded on 1/22/2024 has been reviewed independently by me for compliance and discussed the data with the patient.   Compliance; 100%  More than 4 hr use, 95%  Average use of the device 7 hours 20 minutes per night  Residual AHI: 1.8 /hr (goal < 5.0 " /hr)  Mask type: Nasal mask  Device: DreamStation 2  [employer]      ASSESSMENT AND PLAN:  Obstructive sleep apnea ( G 47.33).  The symptoms of sleep apnea have improved with the device and the treatment.  Patient's compliance with the device is excellent for treatment of sleep apnea.  I have independently reviewed the smart card down load and discussed with the patient the download data and encouarged the patient to continue to use the device.The residual AHI is acceptable. The device is benefiting the patient and the device is medically necessary.  Without proper control of sleep apnea and good compliance there is a increased risk for hypertension, diabetes mellitus and nonrestorative sleep with hypersomnia which can increase risk for motor vehicle accidents.  Untreated sleep apnea is also a risk factor for development of atrial fibrillation, pulmonary hypertension, insulin resistance and stroke. The patient is also instructed to get the supplies from the [employer] and and change them on a regular basis.  A prescription for supplies has been sent to the [employer] [facility].  I have also discussed the good sleep hygiene habits and adequate amount of sleep needed for good health.  Obesity  1 with BMI is Body mass index is 32.7 kg/m².. I have discuss the relationship between the weight and sleep apnea. The benefit of weight loss in reducing severity of sleep apnea was discussed. Discussed diet and exercise with the patient to achieve ideal BMI.  Chronic fatigue.  I have asked the patient to call his PCP and ask for workup for chronic fatigue including vitamin D, thyroid function test and testosterone levels  Return in about 1 year (around 1/22/2025) for with smart card down load. . Patient's questions were answered.    1/22/2024  Juan Alberto Palacios MD  Sleep Medicine.  Medical Director,   [facility] sleep [facility].

## 2024-01-24 ENCOUNTER — LAB (OUTPATIENT)
Dept: LAB | Facility: HOSPITAL | Age: 54
End: 2024-01-24
Payer: COMMERCIAL

## 2024-01-24 ENCOUNTER — TRANSCRIBE ORDERS (OUTPATIENT)
Dept: ADMINISTRATIVE | Facility: HOSPITAL | Age: 54
End: 2024-01-24
Payer: COMMERCIAL

## 2024-01-24 DIAGNOSIS — R53.83 FATIGUE, UNSPECIFIED TYPE: ICD-10-CM

## 2024-01-24 DIAGNOSIS — R53.83 FATIGUE, UNSPECIFIED TYPE: Primary | ICD-10-CM

## 2024-01-24 LAB
25(OH)D3 SERPL-MCNC: 23.6 NG/ML (ref 30–100)
T3FREE SERPL-MCNC: 3.61 PG/ML (ref 2–4.4)
T4 FREE SERPL-MCNC: 1.2 NG/DL (ref 0.93–1.7)
TESTOST SERPL-MCNC: 369 NG/DL (ref 193–740)
TSH SERPL DL<=0.05 MIU/L-ACNC: 2.03 UIU/ML (ref 0.27–4.2)

## 2024-01-24 PROCEDURE — 82306 VITAMIN D 25 HYDROXY: CPT

## 2024-01-24 PROCEDURE — 84439 ASSAY OF FREE THYROXINE: CPT

## 2024-01-24 PROCEDURE — 84443 ASSAY THYROID STIM HORMONE: CPT

## 2024-01-24 PROCEDURE — 36415 COLL VENOUS BLD VENIPUNCTURE: CPT

## 2024-01-24 PROCEDURE — 84403 ASSAY OF TOTAL TESTOSTERONE: CPT

## 2024-01-24 PROCEDURE — 84481 FREE ASSAY (FT-3): CPT

## 2024-04-10 ENCOUNTER — TRANSCRIBE ORDERS (OUTPATIENT)
Dept: ADMINISTRATIVE | Facility: HOSPITAL | Age: 54
End: 2024-04-10
Payer: COMMERCIAL

## 2024-04-10 DIAGNOSIS — M25.541 PAIN IN METACARPUS, RIGHT: Primary | ICD-10-CM

## 2024-05-14 ENCOUNTER — HOSPITAL ENCOUNTER (OUTPATIENT)
Dept: MRI IMAGING | Facility: HOSPITAL | Age: 54
Discharge: HOME | End: 2024-05-14
Admitting: ORTHOPAEDIC SURGERY
Payer: COMMERCIAL

## 2024-05-14 DIAGNOSIS — M25.541 PAIN IN METACARPUS, RIGHT: ICD-10-CM

## 2024-05-14 PROCEDURE — 73218 MRI UPPER EXTREMITY W/O DYE: CPT

## 2024-11-19 ENCOUNTER — PREP FOR SURGERY (OUTPATIENT)
Dept: OTHER | Facility: HOSPITAL | Age: 54
End: 2024-11-19
Payer: COMMERCIAL

## 2024-11-19 ENCOUNTER — CLINICAL SUPPORT (OUTPATIENT)
Dept: GASTROENTEROLOGY | Facility: CLINIC | Age: 54
End: 2024-11-19
Payer: COMMERCIAL

## 2024-11-19 DIAGNOSIS — Z86.0100 HISTORY OF COLON POLYPS: Primary | ICD-10-CM

## 2024-11-19 RX ORDER — SODIUM PICOSULFATE, MAGNESIUM OXIDE, AND ANHYDROUS CITRIC ACID 10; 3.5; 12 MG/160ML; G/160ML; G/160ML
1 LIQUID ORAL 2 TIMES DAILY
Qty: 320 ML | Refills: 0 | Status: SHIPPED | OUTPATIENT
Start: 2024-11-19 | End: 2024-11-20

## 2024-11-19 NOTE — PROGRESS NOTES
Ash Miller  1970  54 y.o.    Reason for call: Screening Colonoscopy    Prep prescribed: Clenpiq  Prep instructions reviewed with patient and sent to patient via regular mail to the home address on file    Clearance needed? No    Is the patient currently on any injectable medications for weight loss or diabetes? No      Family history of colon cancer? 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    Bradycardia     USUALLY RUNS 40'S-50'S; WITH ANESTHESIA HAS DROPPED IN 30'S    Colon polyp 2/2022    Colonoscopy causing post-procedural bleeding 2023    BLEEDING 1 WEEK AFTER REMOVAL OF 40MM COLON POLYP    GERD (gastroesophageal reflux disease)     3-4 years ago    Hyperlipidemia 12/22    Kidney stone     Sleep apnea      No Known Allergies  Past Surgical History:   Procedure Laterality Date    COLONOSCOPY N/A 02/10/2022    Procedure: COLONOSCOPY with biopsies;  Surgeon: Jono Oropeza MD;  Location: [facility] ENDOSCOPY;  Service: General;  Laterality: N/A;  colon polyp, anal polyp    COLONOSCOPY N/A 03/07/2022    Procedure: COLONOSCOPY WITH POLYPECTOMY HOT SNARE, INJECTION ORISE, ENDO CLIPS X7;  Surgeon: Zachery Mosley MD;  Location: [facility] ENDOSCOPY;  Service: Gastroenterology;  Laterality: N/A;  COLON POLYPS, DIVERTICULOSIS    COLONOSCOPY N/A 03/13/2022    Procedure: COLONOSCOPY with clip and epi.;  Surgeon: Etienne Vyas MD;  Location: [facility] ENDOSCOPY;  Service: Gastroenterology;  Laterality: N/A;    COLONOSCOPY N/A 07/11/2022    Procedure: COLONOSCOPY with cold snare polypectomy, and biopsy;  Surgeon: Zachery Mosley MD;  Location: [facility] ENDOSCOPY;  Service: Gastroenterology;  Laterality: N/A;  colon  polyps    COLONOSCOPY N/A 7/17/2023    Procedure: COLONOSCOPY WITH ELEVIEW INJECTION AND COLD SNARE/HOT FORCEPS POLYPECTOMIES;  Surgeon: Zachery Mosley MD;  Location: [facility] ENDOSCOPY;  Service: Gastroenterology;  Laterality: N/A;  POLYPS    COLONOSCOPY N/A 1/12/2024    Procedure: COLONOSCOPY WITH BIOPSIES, POLYPECTOMY;  Surgeon: Zachery Mosley MD;  Location: [facility] ENDOSCOPY;  Service: Gastroenterology;  Laterality: N/A;  COLON POLYP, HEMORRHOIDS, DIVERTICULOSIS    CYSTOSCOPY BLADDER STONE LITHOTRIPSY      ELBOW ARTHROPLASTY      right    ENDOSCOPY N/A 7/17/2023    Procedure: ESOPHAGOGASTRODUODENOSCOPY WITH COLD BIOPSIES;  Surgeon: Zachery Mosley MD;  Location: [facility] ENDOSCOPY;  Service: Gastroenterology;  Laterality: N/A;  IRREGULAR Z LINE    ROTATOR CUFF REPAIR      right    UPPER GASTROINTESTINAL ENDOSCOPY       Social History     Socioeconomic History    Marital status:    Tobacco Use    Smoking status: Never     Passive exposure: Never    Smokeless tobacco: Never   Vaping Use    Vaping status: Never Used   Substance and Sexual Activity    Alcohol use: Yes     Alcohol/week: 2.0 standard drinks of alcohol     Types: 2 Cans of beer per week     Comment: Rarely     Drug use: Never    Sexual activity: Yes     Partners: Female     Birth control/protection: None, Hysterectomy       Current Outpatient Medications:     Chlorcyclizine-Pseudoephed (Stahist AD) 25-60 MG tablet, Take 1 tablet by mouth every 8 (eight) hours as needed (congestion)., Disp: 30 tablet, Rfl: 0    esomeprazole (nexIUM) 40 MG capsule, Take 1 capsule by mouth daily., Disp: 90 capsule, Rfl: 1    naproxen (NAPROSYN) 500 MG tablet, Take 1 tablet by mouth 2 (two) times daily with meals., Disp: 180 tablet, Rfl: 0    atorvastatin (LIPITOR) 10 MG tablet, Take 1 tablet by mouth nightly. (Patient not taking: Reported on 11/19/2024), Disp: 90 tablet, Rfl: 1    diphenhydrAMINE-APAP, sleep, (ACETAMINOPHEN PM PO), Take 1 tablet by  mouth Every Night. (Patient not taking: Reported on 11/19/2024), Disp: , Rfl:     doxycycline (VIBRAMYICN) 100 MG tablet, Take 1 tablet by mouth 2 (two) times daily for 10 days. (Patient not taking: Reported on 11/19/2024), Disp: 20 tablet, Rfl: 0    ergocalciferol (ERGOCALCIFEROL) 1.25 MG (93371 UT) capsule, Take 1 capsule by mouth once a week. (Patient not taking: Reported on 11/19/2024), Disp: 12 capsule, Rfl: 1    esomeprazole (nexIUM) 40 MG capsule, Take 1 capsule by mouth daily. (Patient not taking: Reported on 11/19/2024), Disp: 90 capsule, Rfl: 1    Liraglutide (SAXENDA) 18 MG/3ML injection pen, Inject 3 mg under the skin into the appropriate area as directed Daily. (Patient not taking: Reported on 11/19/2024), Disp: , Rfl:     methylPREDNISolone (MEDROL) 4 MG dose pack, Take as directed on package instructions. (Patient not taking: Reported on 11/19/2024), Disp: 21 tablet, Rfl: 0

## 2025-01-17 NOTE — NURSING NOTE
Spoke to pt about procedure time, place, and date.  Went over prep and medications and bringing a  over 18

## 2025-01-21 ENCOUNTER — TRANSCRIBE ORDERS (OUTPATIENT)
Facility: HOSPITAL | Age: 55
End: 2025-01-21
Payer: COMMERCIAL

## 2025-01-21 ENCOUNTER — LAB (OUTPATIENT)
Facility: HOSPITAL | Age: 55
End: 2025-01-21
Payer: COMMERCIAL

## 2025-01-21 DIAGNOSIS — Z12.5 PROSTATE CANCER SCREENING: Primary | ICD-10-CM

## 2025-01-21 DIAGNOSIS — Z12.5 PROSTATE CANCER SCREENING: ICD-10-CM

## 2025-01-21 DIAGNOSIS — Z00.00 ROUTINE MEDICAL EXAM: ICD-10-CM

## 2025-01-21 DIAGNOSIS — Z13.220 SCREENING FOR LIPOID DISORDERS: ICD-10-CM

## 2025-01-21 LAB
ALBUMIN SERPL-MCNC: 3.9 G/DL (ref 3.5–5.2)
ALBUMIN/GLOB SERPL: 1.4 G/DL
ALP SERPL-CCNC: 75 U/L (ref 39–117)
ALT SERPL W P-5'-P-CCNC: 37 U/L (ref 1–41)
ANION GAP SERPL CALCULATED.3IONS-SCNC: 8 MMOL/L (ref 5–15)
AST SERPL-CCNC: 28 U/L (ref 1–40)
BASOPHILS # BLD AUTO: 0.02 10*3/MM3 (ref 0–0.2)
BASOPHILS NFR BLD AUTO: 0.3 % (ref 0–1.5)
BILIRUB SERPL-MCNC: <0.2 MG/DL (ref 0–1.2)
BUN SERPL-MCNC: 20 MG/DL (ref 6–20)
BUN/CREAT SERPL: 18.3 (ref 7–25)
CALCIUM SPEC-SCNC: 8.9 MG/DL (ref 8.6–10.5)
CHLORIDE SERPL-SCNC: 108 MMOL/L (ref 98–107)
CHOLEST SERPL-MCNC: 141 MG/DL (ref 0–200)
CO2 SERPL-SCNC: 24 MMOL/L (ref 22–29)
CREAT SERPL-MCNC: 1.09 MG/DL (ref 0.76–1.27)
DEPRECATED RDW RBC AUTO: 41.9 FL (ref 37–54)
EGFRCR SERPLBLD CKD-EPI 2021: 80.7 ML/MIN/1.73
EOSINOPHIL # BLD AUTO: 0.15 10*3/MM3 (ref 0–0.4)
EOSINOPHIL NFR BLD AUTO: 2.2 % (ref 0.3–6.2)
ERYTHROCYTE [DISTWIDTH] IN BLOOD BY AUTOMATED COUNT: 12.3 % (ref 12.3–15.4)
GLOBULIN UR ELPH-MCNC: 2.7 GM/DL
GLUCOSE SERPL-MCNC: 95 MG/DL (ref 65–99)
HCT VFR BLD AUTO: 44.6 % (ref 37.5–51)
HDLC SERPL-MCNC: 24 MG/DL (ref 40–60)
HGB BLD-MCNC: 15.1 G/DL (ref 13–17.7)
IMM GRANULOCYTES # BLD AUTO: 0.01 10*3/MM3 (ref 0–0.05)
IMM GRANULOCYTES NFR BLD AUTO: 0.1 % (ref 0–0.5)
LDLC SERPL CALC-MCNC: 63 MG/DL (ref 0–100)
LDLC/HDLC SERPL: 1.99 {RATIO}
LYMPHOCYTES # BLD AUTO: 2.5 10*3/MM3 (ref 0.7–3.1)
LYMPHOCYTES NFR BLD AUTO: 37.4 % (ref 19.6–45.3)
MCH RBC QN AUTO: 31.1 PG (ref 26.6–33)
MCHC RBC AUTO-ENTMCNC: 33.9 G/DL (ref 31.5–35.7)
MCV RBC AUTO: 91.8 FL (ref 79–97)
MONOCYTES # BLD AUTO: 0.58 10*3/MM3 (ref 0.1–0.9)
MONOCYTES NFR BLD AUTO: 8.7 % (ref 5–12)
NEUTROPHILS NFR BLD AUTO: 3.43 10*3/MM3 (ref 1.7–7)
NEUTROPHILS NFR BLD AUTO: 51.3 % (ref 42.7–76)
NRBC BLD AUTO-RTO: 0 /100 WBC (ref 0–0.2)
PLATELET # BLD AUTO: 288 10*3/MM3 (ref 140–450)
PMV BLD AUTO: 10.7 FL (ref 6–12)
POTASSIUM SERPL-SCNC: 4.6 MMOL/L (ref 3.5–5.2)
PROT SERPL-MCNC: 6.6 G/DL (ref 6–8.5)
PSA SERPL-MCNC: 1.15 NG/ML (ref 0–4)
RBC # BLD AUTO: 4.86 10*6/MM3 (ref 4.14–5.8)
SODIUM SERPL-SCNC: 140 MMOL/L (ref 136–145)
TRIGL SERPL-MCNC: 346 MG/DL (ref 0–150)
VLDLC SERPL-MCNC: 54 MG/DL (ref 5–40)
WBC NRBC COR # BLD AUTO: 6.69 10*3/MM3 (ref 3.4–10.8)

## 2025-01-21 PROCEDURE — 36415 COLL VENOUS BLD VENIPUNCTURE: CPT

## 2025-01-21 PROCEDURE — 85025 COMPLETE CBC W/AUTO DIFF WBC: CPT

## 2025-01-21 PROCEDURE — 80061 LIPID PANEL: CPT

## 2025-01-21 PROCEDURE — 80053 COMPREHEN METABOLIC PANEL: CPT

## 2025-01-21 PROCEDURE — G0103 PSA SCREENING: HCPCS

## 2025-01-22 ENCOUNTER — OFFICE VISIT (OUTPATIENT)
Dept: SLEEP MEDICINE | Facility: HOSPITAL | Age: 55
End: 2025-01-22
Payer: COMMERCIAL

## 2025-01-22 VITALS
BODY MASS INDEX: 32.58 KG/M2 | SYSTOLIC BLOOD PRESSURE: 136 MMHG | OXYGEN SATURATION: 98 % | HEART RATE: 50 BPM | WEIGHT: 215 LBS | DIASTOLIC BLOOD PRESSURE: 76 MMHG | HEIGHT: 68 IN

## 2025-01-22 DIAGNOSIS — G47.33 OSA ON CPAP: Primary | ICD-10-CM

## 2025-01-22 DIAGNOSIS — E66.811 CLASS 1 OBESITY: ICD-10-CM

## 2025-01-22 DIAGNOSIS — R94.5 ABNORMAL LIVER FUNCTION: ICD-10-CM

## 2025-01-22 DIAGNOSIS — R94.5 LIVER FUNCTION STUDY, ABNORMAL: Primary | ICD-10-CM

## 2025-01-22 PROCEDURE — G0463 HOSPITAL OUTPT CLINIC VISIT: HCPCS

## 2025-01-22 PROCEDURE — 99213 OFFICE O/P EST LOW 20 MIN: CPT | Performed by: INTERNAL MEDICINE

## 2025-01-22 NOTE — PROGRESS NOTES
"  [facility]  Sleep medicine  [address] [place] [address]  [place]   [place] [address]  Phone: [phone]  Fax: [phone]      SLEEP CLINIC FOLLOW UP PROGRESS NOTE.    Ash Miller  [id number]   1970  54 y.o.  male      PCP: Rocio Fernandez APRN      Date of visit: 1/22/2025    Chief Complaint   Patient presents with    Sleep Apnea    Obesity       HPI:  This is a 54 y.o. years old patient is here for the management of obstructive sleep apnea.  Sleep apnea is severe in severity with a AHI of 32/hr. Patient is using positive airway pressure therapy with auto CPAP and the symptoms of sleep apnea have improved significantly on the therapy.. Normally patient goes to bed at 10 PM and wakes up at 530 AM .  The patient wakes up 3 time(s) during the night and has no problem going back to sleep.  Patient states that he is still has chronic fatigue.  He is a  for the [employer].  He is very uncomfortable with his CPAP and wants to look into inspire    Medications and allergies are reviewed by me and documented in the encounter.     SOCIAL (habits pertaining to sleep medicine)  History tobacco use:No   History of alcohol use: 0 per week  Caffeine use: 1     REVIEW OF SYSTEMS:   Pertaining positive symptoms are:  [place] Sleepiness Scale :Total score: 8   Chronic fatigue      PHYSICAL EXAMINATION:  CONSTITUTIONAL:  Vitals:    01/22/25 0900   BP: 136/76   Pulse: 50   SpO2: 98%   Weight: 97.5 kg (215 lb)   Height: 172.7 cm (67.99\")    Body mass index is 32.7 kg/m².   NOSE: nasal passages are clear, No deformities noted   RESP SYSTEM: Not in any respiratory distress, no chest deformities noted,   CARDIOVASULAR: No edema noted  NEURO: Oriented x 3, gait normal,  Mood and affect appeared appropriate      Data reviewed:  The Smart card downloaded on 1/22/2025 has been reviewed independently by me for compliance and discussed the data with the patient.   Compliance; 100%  More than 4 hr " use, 95%  Average use of the device 7 hours 20 minutes per night  Residual AHI: 1.8 /hr (goal < 5.0 /hr)  Mask type: Nasal mask  Device: DreamStation 2  [employer]      ASSESSMENT AND PLAN:  Obstructive sleep apnea ( G 47.33).  The symptoms of sleep apnea have improved with the device and the treatment.  Patient's compliance with the device is excellent for treatment of sleep apnea.  I have independently reviewed the smart card down load and discussed with the patient the download data and encouarged the patient to continue to use the device.The residual AHI is acceptable. The device is benefiting the patient and the device is medically necessary.  Without proper control of sleep apnea and good compliance there is a increased risk for hypertension, diabetes mellitus and nonrestorative sleep with hypersomnia which can increase risk for motor vehicle accidents.  Untreated sleep apnea is also a risk factor for development of atrial fibrillation, pulmonary hypertension, insulin resistance and stroke. The patient is also instructed to get the supplies from the [employer] and and change them on a regular basis.  A prescription for supplies has been sent to the [employer].  I have also discussed the good sleep hygiene habits and adequate amount of sleep needed for good health.  Patient reports that he is very uncomfortable with the CPAP.  He wants to consider inspire I am going to ask him to see Dr. Jacobs for evaluation for inspire.  Obesity  1 with BMI is Body mass index is 32.7 kg/m².. I have discuss the relationship between the weight and sleep apnea. The benefit of weight loss in reducing severity of sleep apnea was discussed. Discussed diet and exercise with the patient to achieve ideal BMI.  Return in about 1 year (around 1/22/2026) for with smart card down load. . Patient's questions were answered.    1/22/2025  Juan Alberto Palacios MD  Sleep Medicine.  Medical Director,   [facility] [place]  [facility].

## 2025-01-23 ENCOUNTER — ANESTHESIA EVENT (OUTPATIENT)
Dept: GASTROENTEROLOGY | Facility: HOSPITAL | Age: 55
End: 2025-01-23
Payer: COMMERCIAL

## 2025-01-23 NOTE — ANESTHESIA PREPROCEDURE EVALUATION
Anesthesia Evaluation     Patient summary reviewed   NPO Solid Status: > 8 hours  NPO Liquid Status: > 2 hours           Airway   Mallampati: II  TM distance: >3 FB  Neck ROM: full  No difficulty expected  Comment: beard  Dental - normal exam     Pulmonary - normal exam    breath sounds clear to auscultation  (+) ,shortness of breath, sleep apnea on CPAP  Cardiovascular - normal exam  Exercise tolerance: good (4-7 METS)    ECG reviewed  Rhythm: regular  Rate: normal    (+) hyperlipidemia      Neuro/Psych  GI/Hepatic/Renal/Endo    (+) obesity, GERD well controlled, GI bleeding lower resolved, renal disease- stones    Musculoskeletal (-) negative ROS    Abdominal     Abdomen: soft.   Substance History - negative use     OB/GYN          Other - negative ROS       ROS/Med Hx Other: Phentermine: Patient has not started it yet    EKG 9/8/21  HEART RATE= 60  bpm  RR Interval= 996  ms  ID Interval= 188  ms  P Horizontal Axis= 10  deg  P Front Axis= 22  deg  QRSD Interval= 116  ms  QT Interval= 407  ms  QRS Axis= -16  deg  T Wave Axis= 3  deg  - ABNORMAL ECG -  Sinus rhythm  Nonspecific intraventricular conduction delay  Nonspecific T abnormalities, anterior leads  When compared with ECG of 07-Sep-2021 6:25:46,  New conduction abnormality    ECHO 8/3/21  Left Ventricle Calculated left ventricular EF = 66%  Right Ventricle Normal right ventricular cavity size, wall thickness, systolic function and septal motion noted.  Left Atrium Normal left atrial size and volume noted.  Right Atrium Normal right atrial cavity size noted. The diameter of the inferior vena cava is 1.7 cm.  Aortic Valve The aortic valve is structurally normal with no regurgitation or stenosis present.  Mitral Valve The mitral valve is structurally normal with no regurgitation or significant stenosis present.  Tricuspid Valve The tricuspid valve is structurally normal with no significant regurgitation or significant stenosis present.  Pulmonic Valve The  pulmonic valve is structurally normal with no regurgitation or significant stenosis present.  Greater Vessels No dilation of the aortic root is present.  Pericardium The pericardium is normal. There is no evidence of pericardial effusion. .                          Anesthesia Plan    ASA 2     general   total IV anesthesia  (Total IV Anesthesia    Patient understands anesthesia not responsible for dental damage.  )  intravenous induction     Anesthetic plan, risks, benefits, and alternatives have been provided, discussed and informed consent has been obtained with: patient.  Pre-procedure education provided  Plan discussed with CRNA.      CODE STATUS:

## 2025-01-24 ENCOUNTER — ANESTHESIA (OUTPATIENT)
Dept: GASTROENTEROLOGY | Facility: HOSPITAL | Age: 55
End: 2025-01-24
Payer: COMMERCIAL

## 2025-01-24 ENCOUNTER — HOSPITAL ENCOUNTER (OUTPATIENT)
Facility: HOSPITAL | Age: 55
Setting detail: HOSPITAL OUTPATIENT SURGERY
Discharge: HOME | End: 2025-01-24
Attending: INTERNAL MEDICINE | Admitting: INTERNAL MEDICINE
Payer: COMMERCIAL

## 2025-01-24 VITALS
OXYGEN SATURATION: 100 % | BODY MASS INDEX: 32.09 KG/M2 | HEART RATE: 59 BPM | SYSTOLIC BLOOD PRESSURE: 117 MMHG | DIASTOLIC BLOOD PRESSURE: 63 MMHG | WEIGHT: 210.98 LBS | TEMPERATURE: 96.7 F | RESPIRATION RATE: 20 BRPM

## 2025-01-24 PROCEDURE — 25010000002 LIDOCAINE PF 2% 2 % SOLUTION: Performed by: NURSE ANESTHETIST, CERTIFIED REGISTERED

## 2025-01-24 PROCEDURE — 45378 DIAGNOSTIC COLONOSCOPY: CPT | Performed by: INTERNAL MEDICINE

## 2025-01-24 PROCEDURE — 25810000003 LACTATED RINGERS PER 1000 ML: Performed by: NURSE ANESTHETIST, CERTIFIED REGISTERED

## 2025-01-24 PROCEDURE — 25010000002 PROPOFOL 10 MG/ML EMULSION: Performed by: NURSE ANESTHETIST, CERTIFIED REGISTERED

## 2025-01-24 RX ORDER — PROPOFOL 10 MG/ML
VIAL (ML) INTRAVENOUS AS NEEDED
Status: DISCONTINUED | OUTPATIENT
Start: 2025-01-24 | End: 2025-01-24 | Stop reason: SURG

## 2025-01-24 RX ORDER — SODIUM CHLORIDE, SODIUM LACTATE, POTASSIUM CHLORIDE, CALCIUM CHLORIDE 600; 310; 30; 20 MG/100ML; MG/100ML; MG/100ML; MG/100ML
30 INJECTION, SOLUTION INTRAVENOUS CONTINUOUS
Status: DISCONTINUED | OUTPATIENT
Start: 2025-01-24 | End: 2025-01-24 | Stop reason: HOSPADM

## 2025-01-24 RX ORDER — LIDOCAINE HYDROCHLORIDE 20 MG/ML
INJECTION, SOLUTION EPIDURAL; INFILTRATION; INTRACAUDAL; PERINEURAL AS NEEDED
Status: DISCONTINUED | OUTPATIENT
Start: 2025-01-24 | End: 2025-01-24 | Stop reason: SURG

## 2025-01-24 RX ORDER — CETIRIZINE HYDROCHLORIDE 10 MG/1
10 TABLET ORAL DAILY
COMMUNITY

## 2025-01-24 RX ADMIN — SODIUM CHLORIDE, POTASSIUM CHLORIDE, SODIUM LACTATE AND CALCIUM CHLORIDE 30 ML/HR: 600; 310; 30; 20 INJECTION, SOLUTION INTRAVENOUS at 09:50

## 2025-01-24 RX ADMIN — PROPOFOL 250 MCG/KG/MIN: 10 INJECTION, EMULSION INTRAVENOUS at 10:33

## 2025-01-24 RX ADMIN — LIDOCAINE HYDROCHLORIDE 100 MG: 20 INJECTION, SOLUTION EPIDURAL; INFILTRATION; INTRACAUDAL; PERINEURAL at 10:33

## 2025-01-24 RX ADMIN — PROPOFOL 100 MG: 10 INJECTION, EMULSION INTRAVENOUS at 10:33

## 2025-01-24 NOTE — H&P
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    Bradycardia     USUALLY RUNS 40'S-50'S; WITH ANESTHESIA HAS DROPPED IN 30'S    Colon polyp 2/2022    Colonoscopy causing post-procedural bleeding 2023    BLEEDING 1 WEEK AFTER REMOVAL OF 40MM COLON POLYP    GERD (gastroesophageal reflux disease)     3-4 years ago    Hyperlipidemia 12/22    Kidney stone     Sleep apnea        Past Surgical History:  Past Surgical History:   Procedure Laterality Date    COLONOSCOPY N/A 02/10/2022    Procedure: COLONOSCOPY with biopsies;  Surgeon: Jono Oropeza MD;  Location: [facility] ENDOSCOPY;  Service: General;  Laterality: N/A;  colon polyp, anal polyp    COLONOSCOPY N/A 03/07/2022    Procedure: COLONOSCOPY WITH POLYPECTOMY HOT SNARE, INJECTION ORISE, ENDO CLIPS X7;  Surgeon: Zachery Mosley MD;  Location: [facility] ENDOSCOPY;  Service: Gastroenterology;  Laterality: N/A;  COLON POLYPS, DIVERTICULOSIS    COLONOSCOPY N/A 03/13/2022    Procedure: COLONOSCOPY with clip and epi.;  Surgeon: Etienne Vyas MD;  Location: [facility] ENDOSCOPY;  Service: Gastroenterology;  Laterality: N/A;    COLONOSCOPY N/A 07/11/2022    Procedure: COLONOSCOPY with cold snare polypectomy, and biopsy;  Surgeon: Zachery Mosley MD;  Location: [facility] ENDOSCOPY;  Service: Gastroenterology;  Laterality: N/A;  colon polyps    COLONOSCOPY N/A 7/17/2023    Procedure: COLONOSCOPY WITH ELEVIEW INJECTION AND COLD SNARE/HOT FORCEPS POLYPECTOMIES;  Surgeon: Zachery Mosley MD;  Location: [facility] ENDOSCOPY;  Service: Gastroenterology;  Laterality: N/A;  POLYPS    COLONOSCOPY N/A 1/12/2024    Procedure: COLONOSCOPY WITH BIOPSIES, POLYPECTOMY;  Surgeon: Zachery Mosley MD;  Location: [facility] ENDOSCOPY;  Service: Gastroenterology;  Laterality: N/A;  COLON POLYP, HEMORRHOIDS,  DIVERTICULOSIS    CYSTOSCOPY BLADDER STONE LITHOTRIPSY      ELBOW ARTHROPLASTY      right    ENDOSCOPY N/A 7/17/2023    Procedure: ESOPHAGOGASTRODUODENOSCOPY WITH COLD BIOPSIES;  Surgeon: Zachery Mosley MD;  Location: [facility] ENDOSCOPY;  Service: Gastroenterology;  Laterality: N/A;  IRREGULAR Z LINE    ROTATOR CUFF REPAIR      right    UPPER GASTROINTESTINAL ENDOSCOPY         Family History:  Family History   Problem Relation Age of Onset    Cancer Father     Colon cancer Father 78    Colon polyps Father     Malig Hyperthermia Neg Hx        Social History:   reports that he has never smoked. He has never been exposed to tobacco smoke. He has never used smokeless tobacco. He reports current alcohol use of about 2.0 standard drinks of alcohol per week. He reports that he does not use drugs.    Medications:   Medications Prior to Admission   Medication Sig Dispense Refill Last Dose/Taking    Chlorcyclizine-Pseudoephed (Stahist AD) 25-60 MG tablet Take 1 tablet by mouth every 8 (eight) hours as needed (congestion). 30 tablet 0     diclofenac (VOLTAREN) 50 MG EC tablet Take 1 tablet by mouth 2 (two) times daily. 60 tablet 0     esomeprazole (nexIUM) 40 MG capsule Take 1 capsule by mouth daily. 90 capsule 1     naproxen (NAPROSYN) 500 MG tablet Take 1 tablet by mouth 2 (two) times daily with meals. 180 tablet 0     phentermine 15 MG capsule Take 1 capsule by mouth every morning.  Max Daily Amount: 15 mg 30 capsule 0        Allergies:  Patient has no known allergies.        Objective     Weight 95.7 kg (210 lb 15.7 oz).    Physical Exam   Constitutional: Pt is oriented to person, place, and time and well-developed, well-nourished, and in no distress.   Mouth/Throat: Oropharynx is clear and moist.   Neck: Normal range of motion.   Cardiovascular: Normal rate, regular rhythm and normal heart sounds.    Pulmonary/Chest: Effort normal and breath sounds normal.   Abdominal: Soft. Nontender  Skin: Skin is warm and dry.    Psychiatric: Mood, memory, affect and judgment normal.     Assessment & Plan     Diagnosis:  Past history of colon polyps    Anticipated Surgical Procedure:  Colonoscopy    The risks, benefits, and alternatives of this procedure have been discussed with the patient or the responsible party- the patient understands and agrees to proceed.

## 2025-01-24 NOTE — ANESTHESIA POSTPROCEDURE EVALUATION
Patient: Ash Miller    Procedure Summary       Date: 01/24/25 Room / Location: [facility] ENDOSCOPY 4 / [facility] ENDOSCOPY    Anesthesia Start: 1032 Anesthesia Stop: 1050    Procedure: COLONOSCOPY FOR SCREENING Diagnosis:       History of colon polyps      (History of colon polyps [Z86.0100])    Surgeons: Zachery Mosley MD Provider: Ankur Calloway CRNA    Anesthesia Type: general ASA Status: 2            Anesthesia Type: general    Vitals  Vitals Value Taken Time   /87 01/24/25 1109   Temp 35.9 °C (96.7 °F) 01/24/25 1103   Pulse 51 01/24/25 1113   Resp 20 01/24/25 1103   SpO2 100 % 01/24/25 1113   Vitals shown include unfiled device data.        Post Anesthesia Care and Evaluation    Post-procedure mental status: acceptable.  Pain management: satisfactory to patient    Airway patency: patent  Anesthetic complications: No anesthetic complications    Cardiovascular status: acceptable  Respiratory status: acceptable    Comments: Per chart review

## 2025-01-30 ENCOUNTER — OFFICE VISIT (OUTPATIENT)
Dept: SLEEP MEDICINE | Facility: HOSPITAL | Age: 55
End: 2025-01-30
Payer: COMMERCIAL

## 2025-01-30 ENCOUNTER — TELEPHONE (OUTPATIENT)
Dept: GASTROENTEROLOGY | Facility: CLINIC | Age: 55
End: 2025-01-30
Payer: COMMERCIAL

## 2025-01-30 ENCOUNTER — HOSPITAL ENCOUNTER (OUTPATIENT)
Dept: SLEEP MEDICINE | Facility: HOSPITAL | Age: 55
Discharge: HOME | End: 2025-01-30
Admitting: FAMILY MEDICINE
Payer: COMMERCIAL

## 2025-01-30 VITALS — BODY MASS INDEX: 31.07 KG/M2 | HEIGHT: 68 IN | OXYGEN SATURATION: 100 % | HEART RATE: 64 BPM | WEIGHT: 205 LBS

## 2025-01-30 DIAGNOSIS — E66.9 OBESITY (BMI 30-39.9): ICD-10-CM

## 2025-01-30 DIAGNOSIS — G47.10 HYPERSOMNIA: ICD-10-CM

## 2025-01-30 DIAGNOSIS — Z78.9 INTOLERANCE OF CONTINUOUS POSITIVE AIRWAY PRESSURE (CPAP) VENTILATION: ICD-10-CM

## 2025-01-30 DIAGNOSIS — G47.8 NON-RESTORATIVE SLEEP: ICD-10-CM

## 2025-01-30 DIAGNOSIS — G47.33 SEVERE OBSTRUCTIVE SLEEP APNEA: ICD-10-CM

## 2025-01-30 DIAGNOSIS — G47.33 SEVERE OBSTRUCTIVE SLEEP APNEA: Primary | ICD-10-CM

## 2025-01-30 PROCEDURE — G0463 HOSPITAL OUTPT CLINIC VISIT: HCPCS

## 2025-01-30 PROCEDURE — G0399 HOME SLEEP TEST/TYPE 3 PORTA: HCPCS

## 2025-01-30 PROCEDURE — 99213 OFFICE O/P EST LOW 20 MIN: CPT | Performed by: FAMILY MEDICINE

## 2025-01-30 NOTE — PROGRESS NOTES
"Follow Up [facility] Disorders [facility] Note     Chief Complaint:  BERTHA     Primary Care Physician: Rocio Fernandez APRN    Interval History:   The patient is a 54 y.o. male  who was last seen in the sleep lab: 1/22/2025; new patient to me; patient Dr. Arrietamy; presents today to discuss inspire therapy.  Baseline AHI 32 events per hour.  Has a newer machine at home however giving him trouble therefore he went back to his DreamStation 2 auto CPAP 10-15 cm H2O; data up until 12/13/2024 shows average usage of 7 hours 13 minutes average pressure 10.5 cm H2O average AHI 1.6.  Pressure settings 10-15 cm H2O.  Does not feel rested.  BMI 31.2.    Review of Systems:    A complete review of systems was done and all were negative with the exception of dry mouth acid reflux    Social History:    Social History     Socioeconomic History    Marital status:    Tobacco Use    Smoking status: Never     Passive exposure: Never    Smokeless tobacco: Never   Vaping Use    Vaping status: Never Used   Substance and Sexual Activity    Alcohol use: Yes     Alcohol/week: 2.0 standard drinks of alcohol     Types: 2 Cans of beer per week     Comment: Rarely     Drug use: Never    Sexual activity: Yes     Partners: Female     Birth control/protection: None, Hysterectomy       Allergies:  Patient has no known allergies.     Medication Review:  Reviewed.      Vital Signs:    Vitals:    01/30/25 0900   Pulse: 64   SpO2: 100%   Weight: 93 kg (205 lb)   Height: 172.7 cm (67.99\")     Body mass index is 31.18 kg/m².    Physical Exam:    Constitutional:  Well developed 54 y.o. male that appears in no apparent distress.  Awake & oriented times 3.  Normal mood with normal recent and remote memory and normal judgement.  Eyes:  Conjunctivae normal.  Oropharynx: Previously, moist mucous membranes.    Self-administered Eldena Sleepiness Scale test results: 5  0-5 Lower normal daytime sleepiness  6-10 Higher normal daytime sleepiness  11-12 Mild, 13-15 " Moderate, & 16-24 Severe excessive daytime sleepiness    Impression:   1. Severe obstructive sleep apnea    2. Obesity (BMI 30-39.9)    3. Intolerance of continuous positive airway pressure (CPAP) ventilation    4. Hypersomnia    5. Non-restorative sleep        Intolerant to CPAP.  Interested in hypoglossal nerve stimulator therapy.  Follow-up HST to confirm AHI .  If it is refer to ENT for DISE to further consider inspire therapy.  In the meantime continue CPAP.    Plan:  Good sleep hygiene measures should be maintained.  Weight loss would be beneficial in this patient who is obese by Body mass index is 31.18 kg/m²..      I answered all of the patient's questions.       Mahin Jacobs MD  Sleep Medicine  01/30/25  09:29 EST

## 2025-01-30 NOTE — TELEPHONE ENCOUNTER
Colonoscopy 1/24/2025: Good prep, grade 1 internal hemorrhoids, diverticula in the sigmoid  Repeat colon in 5 years, send letter

## 2025-02-17 ENCOUNTER — LAB (OUTPATIENT)
Facility: HOSPITAL | Age: 55
End: 2025-02-17
Payer: COMMERCIAL

## 2025-02-17 DIAGNOSIS — R94.5 LIVER FUNCTION STUDY, ABNORMAL: ICD-10-CM

## 2025-02-17 DIAGNOSIS — R94.5 ABNORMAL LIVER FUNCTION: ICD-10-CM

## 2025-02-17 LAB
FERRITIN SERPL-MCNC: 86.7 NG/ML (ref 30–400)
HAV IGM SERPL QL IA: NORMAL
HBV CORE IGM SERPL QL IA: NORMAL
HBV SURFACE AG SERPL QL IA: NORMAL
HCV AB SER QL: NORMAL
IRON 24H UR-MRATE: 48 MCG/DL (ref 59–158)
IRON SATN MFR SERPL: 14 % (ref 20–50)
TIBC SERPL-MCNC: 346 MCG/DL (ref 298–536)
TRANSFERRIN SERPL-MCNC: 232 MG/DL (ref 200–360)
URATE SERPL-MCNC: 4.7 MG/DL (ref 3.4–7)

## 2025-02-17 PROCEDURE — 83540 ASSAY OF IRON: CPT

## 2025-02-17 PROCEDURE — 82728 ASSAY OF FERRITIN: CPT

## 2025-02-17 PROCEDURE — 84550 ASSAY OF BLOOD/URIC ACID: CPT

## 2025-02-17 PROCEDURE — 81256 HFE GENE: CPT

## 2025-02-17 PROCEDURE — 36415 COLL VENOUS BLD VENIPUNCTURE: CPT

## 2025-02-17 PROCEDURE — 80074 ACUTE HEPATITIS PANEL: CPT

## 2025-02-17 PROCEDURE — 84466 ASSAY OF TRANSFERRIN: CPT

## 2025-02-24 LAB
HFE GENE MUT ANL BLD/T: NORMAL
IMP & REVIEW OF LAB RESULTS: NORMAL

## 2025-02-27 DIAGNOSIS — G47.10 HYPERSOMNIA: ICD-10-CM

## 2025-02-27 DIAGNOSIS — G47.33 OBSTRUCTIVE SLEEP APNEA: Primary | ICD-10-CM

## 2025-02-27 DIAGNOSIS — E66.9 OBESITY (BMI 30-39.9): ICD-10-CM

## 2025-02-27 DIAGNOSIS — Z78.9 INTOLERANCE OF CONTINUOUS POSITIVE AIRWAY PRESSURE (CPAP) VENTILATION: ICD-10-CM

## 2025-02-27 DIAGNOSIS — G47.8 NON-RESTORATIVE SLEEP: ICD-10-CM

## 2025-03-11 ENCOUNTER — TELEPHONE (OUTPATIENT)
Dept: SLEEP MEDICINE | Facility: HOSPITAL | Age: 55
End: 2025-03-11
Payer: COMMERCIAL

## 2025-03-12 ENCOUNTER — TELEPHONE (OUTPATIENT)
Dept: SLEEP MEDICINE | Facility: HOSPITAL | Age: 55
End: 2025-03-12
Payer: COMMERCIAL

## 2025-04-14 ENCOUNTER — TRANSCRIBE ORDERS (OUTPATIENT)
Dept: ADMINISTRATIVE | Facility: HOSPITAL | Age: 55
End: 2025-04-14
Payer: COMMERCIAL

## 2025-04-14 ENCOUNTER — HOSPITAL ENCOUNTER (OUTPATIENT)
Dept: GENERAL RADIOLOGY | Facility: HOSPITAL | Age: 55
Discharge: HOME | End: 2025-04-14
Admitting: NURSE PRACTITIONER
Payer: COMMERCIAL

## 2025-04-14 DIAGNOSIS — G89.29 CHRONIC PAIN OF LEFT KNEE: Primary | ICD-10-CM

## 2025-04-14 DIAGNOSIS — G89.29 CHRONIC PAIN OF LEFT KNEE: ICD-10-CM

## 2025-04-14 DIAGNOSIS — M25.562 CHRONIC PAIN OF LEFT KNEE: Primary | ICD-10-CM

## 2025-04-14 DIAGNOSIS — M25.562 CHRONIC PAIN OF LEFT KNEE: ICD-10-CM

## 2025-04-14 PROCEDURE — 73562 X-RAY EXAM OF KNEE 3: CPT

## 2025-04-23 ENCOUNTER — OFFICE VISIT (OUTPATIENT)
Dept: ORTHOPEDIC SURGERY | Facility: CLINIC | Age: 55
End: 2025-04-23
Payer: COMMERCIAL

## 2025-04-23 VITALS
OXYGEN SATURATION: 97 % | HEART RATE: 51 BPM | SYSTOLIC BLOOD PRESSURE: 155 MMHG | DIASTOLIC BLOOD PRESSURE: 76 MMHG | BODY MASS INDEX: 31.18 KG/M2 | HEIGHT: 68 IN

## 2025-04-23 DIAGNOSIS — M17.12 PRIMARY OSTEOARTHRITIS OF LEFT KNEE: Primary | ICD-10-CM

## 2025-04-23 RX ORDER — LIDOCAINE HYDROCHLORIDE 10 MG/ML
5 INJECTION, SOLUTION INFILTRATION; PERINEURAL
Status: COMPLETED | OUTPATIENT
Start: 2025-04-23 | End: 2025-04-23

## 2025-04-23 RX ORDER — IBUPROFEN 800 MG/1
800 TABLET, FILM COATED ORAL 3 TIMES DAILY
Qty: 90 TABLET | Refills: 0 | Status: SHIPPED | OUTPATIENT
Start: 2025-04-23

## 2025-04-23 RX ORDER — TRIAMCINOLONE ACETONIDE 40 MG/ML
40 INJECTION, SUSPENSION INTRA-ARTICULAR; INTRAMUSCULAR
Status: COMPLETED | OUTPATIENT
Start: 2025-04-23 | End: 2025-04-23

## 2025-04-23 RX ADMIN — LIDOCAINE HYDROCHLORIDE 5 ML: 10 INJECTION, SOLUTION INFILTRATION; PERINEURAL at 09:39

## 2025-04-23 RX ADMIN — TRIAMCINOLONE ACETONIDE 40 MG: 40 INJECTION, SUSPENSION INTRA-ARTICULAR; INTRAMUSCULAR at 09:39

## 2025-04-23 NOTE — PROGRESS NOTES
"Chief Complaint  Initial Evaluation of the Left Knee    Subjective          Ash Miller presents to [facility] for an evaluation  of his left knee.     History of Present Illness    The patient presents here today for an evaluation  of his left knee. His left knee has been bothering him for over a year but has gradually gotten worse. He has pain with prolonged walking and going up and down stairs. He denies any specific injury, trauma, falls or prior surgery to his left knee. He recently went to his family doctor where he had x-rays done on his left knee.     No Known Allergies     Social History     Socioeconomic History    Marital status:    Tobacco Use    Smoking status: Never     Passive exposure: Never    Smokeless tobacco: Never   Vaping Use    Vaping status: Never Used   Substance and Sexual Activity    Alcohol use: Yes     Alcohol/week: 2.0 standard drinks of alcohol     Types: 2 Cans of beer per week     Comment: Rarely     Drug use: Never    Sexual activity: Yes     Partners: Female     Birth control/protection: None, Hysterectomy        I reviewed the patient's chief complaint, history of present illness, review of systems, past medical history, surgical history, family history, social history, medications, and allergy list.     REVIEW OF SYSTEMS    Constitutional: Denies fevers, chills, weight loss  Cardiovascular: Denies chest pain, shortness of breath  Skin: Denies rashes, acute skin changes  Neurologic: Denies headache, loss of consciousness  MSK: left knee pain       Objective   Vital Signs:   /76   Pulse 51   Ht 172.7 cm (67.99\")   SpO2 97%   BMI 31.18 kg/m²     Body mass index is 31.18 kg/m².    Physical Exam    General: Alert. No acute distress.   Left lower extremity: full ex , no effusion, flexion  to 120 degrees, stable to varus/valgus stress, stable Lachman's and posterior drawer, pain with Kai's with no pop, smooth hip range of motion, " calf soft, distal neurovascularly intact, positive  pulses, positive EHL, FHL, GS, and TA. Sensation intact to all 5 nerves of the foot.      Left  knee injection: L knee  Date/Time: 4/23/2025 9:39 AM  Consent given by: patient  Site marked: site marked  Timeout: Immediately prior to procedure a time out was called to verify the correct patient, procedure, equipment, support staff and site/side marked as required   Supporting Documentation  Indications: pain   Procedure Details  Location: knee - L knee  Needle gauge: 21g.  Medications administered: 5 mL lidocaine 1 %; 40 mg triamcinolone acetonide 40 MG/ML  Patient tolerance: patient tolerated the procedure well with no immediate complications    This injection documentation was Scribed for Cameron Marie MD by Deborah Aldana MA.  04/23/25   09:40 EDT    Imaging Results (Most Recent)       None                     Assessment and Plan        XR Knee 3 View Left  Result Date: 4/15/2025  Narrative: XR KNEE 3 VW LEFT Date of Exam: 4/14/2025 9:05 AM EDT Indication: chronic pain Comparison: None available. Findings: Mild degenerative changes are present in the medial and patellofemoral compartments. No fractures, dislocations or acute osseous abnormalities are identified.     Impression: Impression: Mild degenerative change. No acute osseous abnormalities are identified. Electronically Signed: Vimal Anna MD  4/15/2025 12:05 PM EDT  Workstation ID: [id number]    XR Knee 3 Vws LT  Result Date: 4/14/2025  Narrative: This result has an attachment that is not available.       There are no diagnoses linked to this encounter.    The patient presents here today for an evaluation  of his left knee.     Discussed the risks and benefits of a left knee steroid injection today in the office. Patient expressed understanding and wishes to proceed. Patient tolerted the injection well and without any complications.     Home exercises given today and Ibuprofen 800 mg sent into the  pharmacy today.     Due to the patients high blood pressure reading today, I advised the patient to contact their PCP.      Call or return if worsening symptoms.    Scribed for Cameron Marie MD by Thea Zhang  04/23/2025   08:58 EDT         Follow Up     3 months     Patient was given instructions and counseling regarding his condition or for health maintenance advice. Please see specific information pulled into the AVS if appropriate.       I have personally performed the services described in this document as scribed by the above individual and it is both accurate and complete. Cameron Marie MD 04/23/25 10:56 EDT

## 2025-05-19 NOTE — H&P
[facility]   PREOPERATIVE HISTORY AND PHYSICAL    Patient Name:Ash Miller  : 1970  MRN: [id number]  Primary Care Physician: Rocio Fernandez APRN  Date of admission: (Not on file)    Subjective   Subjective     Chief Complaint: preoperative evaluation    HPI  Ash Miller is a 54 y.o. male who presents for preoperative evaluation. He is scheduled for EVALUATION OF SLEEP-DISORDERED BREATHING BY EXAMINATION OF UPPER AIRWAY USING AN ENDOSCOPE (N/A).       Personal History     Past Medical History:   Diagnosis Date    Zuniga esophagus 2020    Bradycardia     USUALLY RUNS 40'S-50'S; WITH ANESTHESIA HAS DROPPED IN 30'S    Colon polyp 2022    Colonoscopy causing post-procedural bleeding     BLEEDING 1 WEEK AFTER REMOVAL OF 40MM COLON POLYP    GERD (gastroesophageal reflux disease)     3-4 years ago    Hyperlipidemia     Kidney stone     Sleep apnea        Past Surgical History:   Procedure Laterality Date    COLONOSCOPY N/A 02/10/2022    Procedure: COLONOSCOPY with biopsies;  Surgeon: Jono Oropeza MD;  Location: [facility] ENDOSCOPY;  Service: General;  Laterality: N/A;  colon polyp, anal polyp    COLONOSCOPY N/A 2022    Procedure: COLONOSCOPY WITH POLYPECTOMY HOT SNARE, INJECTION ORISE, ENDO CLIPS X7;  Surgeon: Zachery Mosley MD;  Location: [facility] ENDOSCOPY;  Service: Gastroenterology;  Laterality: N/A;  COLON POLYPS, DIVERTICULOSIS    COLONOSCOPY N/A 2022    Procedure: COLONOSCOPY with clip and epi.;  Surgeon: Etienne Vyas MD;  Location: [facility] ENDOSCOPY;  Service: Gastroenterology;  Laterality: N/A;    COLONOSCOPY N/A 2022    Procedure: COLONOSCOPY with cold snare polypectomy, and biopsy;  Surgeon: Zachery Mosley MD;  Location: [facility] ENDOSCOPY;  Service: Gastroenterology;  Laterality: N/A;  colon polyps    COLONOSCOPY N/A 2023    Procedure: COLONOSCOPY WITH ELEVIEW INJECTION AND COLD SNARE/HOT FORCEPS POLYPECTOMIES;  Surgeon:  Zachery Mosley MD;  Location: [facility] ENDOSCOPY;  Service: Gastroenterology;  Laterality: N/A;  POLYPS    COLONOSCOPY N/A 1/12/2024    Procedure: COLONOSCOPY WITH BIOPSIES, POLYPECTOMY;  Surgeon: Zachery Mosley MD;  Location: [facility] ENDOSCOPY;  Service: Gastroenterology;  Laterality: N/A;  COLON POLYP, HEMORRHOIDS, DIVERTICULOSIS    COLONOSCOPY N/A 1/24/2025    Procedure: COLONOSCOPY FOR SCREENING;  Surgeon: Zachery Mosley MD;  Location: [facility] ENDOSCOPY;  Service: Gastroenterology;  Laterality: N/A;  DIVERTICULOSIS, HEMORRHOIDS    CYSTOSCOPY BLADDER STONE LITHOTRIPSY      ELBOW ARTHROPLASTY      right    ENDOSCOPY N/A 7/17/2023    Procedure: ESOPHAGOGASTRODUODENOSCOPY WITH COLD BIOPSIES;  Surgeon: Zachery Mosley MD;  Location: [facility] ENDOSCOPY;  Service: Gastroenterology;  Laterality: N/A;  IRREGULAR Z LINE    ROTATOR CUFF REPAIR      right    UPPER GASTROINTESTINAL ENDOSCOPY         Family History: His family history includes Cancer in his father; Colon cancer (age of onset: 78) in his father; Colon polyps in his father.     Social History: He  reports that he has never smoked. He has never been exposed to tobacco smoke. He has never used smokeless tobacco. He reports current alcohol use of about 2.0 standard drinks of alcohol per week. He reports that he does not use drugs.    Home Medications:  cetirizine, diclofenac, esomeprazole, ibuprofen, melatonin, oseltamivir, phentermine, predniSONE, and topiramate    Allergies:  He has no known allergies.    Objective    Objective     Vitals:       ENT Physical Exam  Constitutional  Appearance: patient appears well-developed, well-nourished and well-groomed,  Communication/Voice: communication appropriate for developmental age; vocal quality normal;  Head and Face  Appearance: head appears normal, face appears normal and face appears atraumatic;  Palpation: facial palpation normal;  Salivary: glands normal;  Ear  Hearing: intact;  Auricles: right  auricle normal; left auricle normal;  External Mastoids: right external mastoid normal; left external mastoid normal;  Ear Canals: right ear canal normal; left ear canal normal;  Tympanic Membranes: right tympanic membrane normal; left tympanic membrane normal;  Nose  External Nose: nares patent bilaterally; external nose normal;  Internal Nose: nasal mucosa normal; septum normal; bilateral inferior turbinates normal;  Oral Cavity/Oropharynx  Lips: normal;  Teeth: normal;  Gums: gingiva normal;  Tongue: normal;  Oral mucosa: normal;  Hard palate: normal;  Soft palate: normal;  Tonsils: normal;  Base of Tongue: normal;  Posterior pharyngeal wall: normal;  Neck  Neck: neck normal; neck palpation normal;  Thyroid: thyroid normal;  Respiratory  Inspection: breathing unlabored; normal breathing rate;  Auscultation: breath sounds are clear;  Cardiovascular  Inspection: extremities are warm and well perfused; no peripheral edema present;  Auscultation: regular rate and rhythm;    Assessment & Plan   Assessment / Plan     Brief Patient Summary:  Ash Miller is a 54 y.o. male who presents for preoperative evaluation.    Pre-Op Diagnosis Codes:      * Obstructive sleep apnea (adult) (pediatric) [G47.33]    Active Hospital Problems:  There are no active hospital problems to display for this patient.    Plan:   Procedure(s):  EVALUATION OF SLEEP-DISORDERED BREATHING BY EXAMINATION OF UPPER AIRWAY USING AN ENDOSCOPE    The risks, benefits, and alternatives of the procedure including but not limited to pain, numbness, nerve injury, scarring, bleeding, infection, persistent symptoms and risks of the anesthesia were discussed full with the patient and questions were answered. No guarantees were made or implied.      Romain Blanca MD

## 2025-05-20 ENCOUNTER — ANESTHESIA (OUTPATIENT)
Dept: SURGERY | Facility: SURGERY CENTER | Age: 55
End: 2025-05-20
Payer: COMMERCIAL

## 2025-05-20 ENCOUNTER — HOSPITAL ENCOUNTER (OUTPATIENT)
Facility: SURGERY CENTER | Age: 55
Setting detail: HOSPITAL OUTPATIENT SURGERY
Discharge: HOME | End: 2025-05-20
Attending: OTOLARYNGOLOGY | Admitting: OTOLARYNGOLOGY
Payer: COMMERCIAL

## 2025-05-20 ENCOUNTER — ANESTHESIA EVENT (OUTPATIENT)
Dept: SURGERY | Facility: SURGERY CENTER | Age: 55
End: 2025-05-20
Payer: COMMERCIAL

## 2025-05-20 VITALS
RESPIRATION RATE: 16 BRPM | BODY MASS INDEX: 31.86 KG/M2 | WEIGHT: 203 LBS | TEMPERATURE: 97.7 F | SYSTOLIC BLOOD PRESSURE: 113 MMHG | HEIGHT: 67 IN | HEART RATE: 63 BPM | DIASTOLIC BLOOD PRESSURE: 94 MMHG | OXYGEN SATURATION: 96 %

## 2025-05-20 PROCEDURE — 25010000002 LIDOCAINE PF 2% 2 % SOLUTION: Performed by: NURSE ANESTHETIST, CERTIFIED REGISTERED

## 2025-05-20 PROCEDURE — 25010000002 PROPOFOL 10 MG/ML EMULSION: Performed by: NURSE ANESTHETIST, CERTIFIED REGISTERED

## 2025-05-20 PROCEDURE — 42975 DISE EVAL SLP DO BRTH FLX DX: CPT | Performed by: OTOLARYNGOLOGY

## 2025-05-20 PROCEDURE — 25810000003 LACTATED RINGERS PER 1000 ML: Performed by: OTOLARYNGOLOGY

## 2025-05-20 PROCEDURE — 25010000002 PROPOFOL 500 MG/50ML EMULSION: Performed by: NURSE ANESTHETIST, CERTIFIED REGISTERED

## 2025-05-20 RX ORDER — OXYMETAZOLINE HYDROCHLORIDE 0.05 G/100ML
2 SPRAY NASAL 2 TIMES DAILY
Status: DISCONTINUED | OUTPATIENT
Start: 2025-05-20 | End: 2025-05-20 | Stop reason: HOSPADM

## 2025-05-20 RX ORDER — LIDOCAINE HYDROCHLORIDE 10 MG/ML
0.5 INJECTION, SOLUTION INFILTRATION; PERINEURAL ONCE AS NEEDED
Status: DISCONTINUED | OUTPATIENT
Start: 2025-05-20 | End: 2025-05-20 | Stop reason: HOSPADM

## 2025-05-20 RX ORDER — PROPOFOL 10 MG/ML
INJECTION, EMULSION INTRAVENOUS AS NEEDED
Status: DISCONTINUED | OUTPATIENT
Start: 2025-05-20 | End: 2025-05-20 | Stop reason: SURG

## 2025-05-20 RX ORDER — FENTANYL CITRATE 50 UG/ML
25 INJECTION, SOLUTION INTRAMUSCULAR; INTRAVENOUS
Status: DISCONTINUED | OUTPATIENT
Start: 2025-05-20 | End: 2025-05-20 | Stop reason: HOSPADM

## 2025-05-20 RX ORDER — FLUMAZENIL 0.1 MG/ML
0.2 INJECTION INTRAVENOUS AS NEEDED
Status: DISCONTINUED | OUTPATIENT
Start: 2025-05-20 | End: 2025-05-20 | Stop reason: HOSPADM

## 2025-05-20 RX ORDER — ONDANSETRON 2 MG/ML
4 INJECTION INTRAMUSCULAR; INTRAVENOUS ONCE AS NEEDED
Status: DISCONTINUED | OUTPATIENT
Start: 2025-05-20 | End: 2025-05-20 | Stop reason: HOSPADM

## 2025-05-20 RX ORDER — DIPHENHYDRAMINE HYDROCHLORIDE 50 MG/ML
12.5 INJECTION, SOLUTION INTRAMUSCULAR; INTRAVENOUS
Status: DISCONTINUED | OUTPATIENT
Start: 2025-05-20 | End: 2025-05-20 | Stop reason: HOSPADM

## 2025-05-20 RX ORDER — PROMETHAZINE HYDROCHLORIDE 12.5 MG/1
25 TABLET ORAL ONCE AS NEEDED
Status: DISCONTINUED | OUTPATIENT
Start: 2025-05-20 | End: 2025-05-20 | Stop reason: HOSPADM

## 2025-05-20 RX ORDER — LIDOCAINE HYDROCHLORIDE 20 MG/ML
INJECTION, SOLUTION EPIDURAL; INFILTRATION; INTRACAUDAL; PERINEURAL AS NEEDED
Status: DISCONTINUED | OUTPATIENT
Start: 2025-05-20 | End: 2025-05-20 | Stop reason: SURG

## 2025-05-20 RX ORDER — DROPERIDOL 2.5 MG/ML
0.62 INJECTION, SOLUTION INTRAMUSCULAR; INTRAVENOUS
Status: DISCONTINUED | OUTPATIENT
Start: 2025-05-20 | End: 2025-05-20 | Stop reason: HOSPADM

## 2025-05-20 RX ORDER — SODIUM CHLORIDE 0.9 % (FLUSH) 0.9 %
10 SYRINGE (ML) INJECTION AS NEEDED
Status: DISCONTINUED | OUTPATIENT
Start: 2025-05-20 | End: 2025-05-20 | Stop reason: HOSPADM

## 2025-05-20 RX ORDER — SODIUM CHLORIDE, SODIUM LACTATE, POTASSIUM CHLORIDE, CALCIUM CHLORIDE 600; 310; 30; 20 MG/100ML; MG/100ML; MG/100ML; MG/100ML
1000 INJECTION, SOLUTION INTRAVENOUS CONTINUOUS
Status: DISCONTINUED | OUTPATIENT
Start: 2025-05-20 | End: 2025-05-20 | Stop reason: HOSPADM

## 2025-05-20 RX ORDER — NALOXONE HCL 0.4 MG/ML
0.2 VIAL (ML) INJECTION AS NEEDED
Status: DISCONTINUED | OUTPATIENT
Start: 2025-05-20 | End: 2025-05-20 | Stop reason: HOSPADM

## 2025-05-20 RX ORDER — PROMETHAZINE HYDROCHLORIDE 25 MG/1
25 SUPPOSITORY RECTAL ONCE AS NEEDED
Status: DISCONTINUED | OUTPATIENT
Start: 2025-05-20 | End: 2025-05-20 | Stop reason: HOSPADM

## 2025-05-20 RX ORDER — ATROPINE SULFATE 0.4 MG/ML
0.4 INJECTION, SOLUTION INTRAMUSCULAR; INTRAVENOUS; SUBCUTANEOUS ONCE AS NEEDED
Status: DISCONTINUED | OUTPATIENT
Start: 2025-05-20 | End: 2025-05-20 | Stop reason: HOSPADM

## 2025-05-20 RX ADMIN — Medication 2 SPRAY: at 07:00

## 2025-05-20 RX ADMIN — PROPOFOL 100 MCG/KG/MIN: 10 INJECTION, EMULSION INTRAVENOUS at 07:56

## 2025-05-20 RX ADMIN — SODIUM CHLORIDE, SODIUM LACTATE, POTASSIUM CHLORIDE, AND CALCIUM CHLORIDE 1000 ML: 600; 310; 30; 20 INJECTION, SOLUTION INTRAVENOUS at 07:06

## 2025-05-20 RX ADMIN — LIDOCAINE HYDROCHLORIDE 100 MG: 20 INJECTION, SOLUTION EPIDURAL; INFILTRATION; INTRACAUDAL; PERINEURAL at 07:56

## 2025-05-20 RX ADMIN — Medication 2 SPRAY: at 07:28

## 2025-05-20 RX ADMIN — PROPOFOL 20 MG: 10 INJECTION, EMULSION INTRAVENOUS at 07:57

## 2025-05-20 NOTE — ANESTHESIA POSTPROCEDURE EVALUATION
"Patient: Ash Miller    Procedure Summary       Date: 05/20/25 Room / Location: [facility]    Anesthesia Start: 0752 Anesthesia Stop: 0815    Procedure: EVALUATION OF SLEEP-DISORDERED BREATHING BY EXAMINATION OF UPPER AIRWAY USING AN ENDOSCOPE (Throat) Diagnosis:       Obstructive sleep apnea (adult) (pediatric)      (Obstructive sleep apnea (adult) (pediatric) [G47.33])    Surgeons: Romain Blanca MD Provider: Aly Gutierrez MD    Anesthesia Type: MAC ASA Status: 3            Anesthesia Type: MAC    Vitals  Vitals Value Taken Time   /71 05/20/25 08:40   Temp 36.5 °C (97.7 °F) 05/20/25 08:14   Pulse 58 05/20/25 08:35   Resp 16 05/20/25 08:14   SpO2 96 % 05/20/25 08:31   Vitals shown include unfiled device data.        Post Anesthesia Care and Evaluation    Level of consciousness: awake and alert  Pain management: adequate    Airway patency: patent  Anesthetic complications: No anesthetic complications  PONV Status: none  Cardiovascular status: acceptable  Respiratory status: acceptable  Hydration status: acceptable    Comments: /94   Pulse 63   Temp 36.5 °C (97.7 °F) (Temporal)   Resp 16   Ht 170.2 cm (67\")   Wt 92.1 kg (203 lb)   SpO2 96%   BMI 31.79 kg/m²       "

## 2025-05-20 NOTE — OP NOTE
[facility]  Progress Note    Ash Miller  5/20/2025    Pre-op Diagnosis:   Obstructive sleep apnea (adult) (pediatric) [G47.33]       Post-Op Diagnosis Codes:     * Obstructive sleep apnea (adult) (pediatric) [G47.33]    Procedure(s):      Procedure(s):  EVALUATION OF SLEEP-DISORDERED BREATHING BY EXAMINATION OF UPPER AIRWAY USING AN ENDOSCOPE              Surgeon(s):  Romain Blanca MD    Anesthesia: Monitored Anesthesia Care    Staff:   Circulator: Karlie Figueroa RN  Scrub Person: Qi Edward       Estimated Blood Loss: none    Urine Voided: * No values recorded between 5/20/2025  7:52 AM and 5/20/2025  8:07 AM *    Specimens:                None      Drains: * No LDAs found *    Findings: No evidence of concentric collapse at any point in his airway.  He had primary anterior to posterior collapse at the level of the palate.  More inferiorly, he did have some moderate lateral wall collapse at the level of the oropharynx but even this did not approach complete concentric collapse.  Jaw thrust did moderately improve his airway.  He appears to be an excellent candidate for hypoglossal nerve stimulation therapy.    OPERATIVE REPORT: The patient was taken to the operating room placed in the supine position with the head of bed elevated 30 degrees.  Sequential doses of propofol were given to achieve a state of sedation mimicking normal sleep conditions.  Upon reaching this with noting audible snoring, the flexible nasolaryngoscope with the video system attached was passed through the nasal passage.  This was kept at the superiormost aspect of the nasopharynx initially.  This viewed the velopharynx from above.  The findings were noted as above.  The images were recorded.  Upon completion, the scope was withdrawn and the patient was allowed to awaken from anesthesia and taken to the recovery room in satisfactory condition.      Complications: none          Romain Blanca MD     Date:  5/20/2025  Time: 08:09 EDT

## 2025-05-22 ENCOUNTER — OFFICE VISIT (OUTPATIENT)
Dept: CARDIOLOGY | Facility: CLINIC | Age: 55
End: 2025-05-22
Payer: COMMERCIAL

## 2025-05-22 VITALS
HEIGHT: 60 IN | BODY MASS INDEX: 40.64 KG/M2 | SYSTOLIC BLOOD PRESSURE: 128 MMHG | HEART RATE: 71 BPM | WEIGHT: 207 LBS | DIASTOLIC BLOOD PRESSURE: 86 MMHG

## 2025-05-22 DIAGNOSIS — Z82.49 FAMILY HISTORY OF ABDOMINAL AORTIC ANEURYSM (AAA): ICD-10-CM

## 2025-05-22 DIAGNOSIS — E78.2 MIXED HYPERLIPIDEMIA: ICD-10-CM

## 2025-05-22 DIAGNOSIS — D50.9 IRON DEFICIENCY ANEMIA, UNSPECIFIED IRON DEFICIENCY ANEMIA TYPE: Primary | ICD-10-CM

## 2025-05-22 PROCEDURE — 99204 OFFICE O/P NEW MOD 45 MIN: CPT | Performed by: INTERNAL MEDICINE

## 2025-05-22 PROCEDURE — 93000 ELECTROCARDIOGRAM COMPLETE: CPT | Performed by: INTERNAL MEDICINE

## 2025-05-22 NOTE — PROGRESS NOTES
CARDIOLOGY INITIAL CONSULT       Chief Complaint  Establish Care and Hyperlipidemia    Subjective            Ash Miller presents to [facility] CARDIOLOGY  History of Present Illness  The patient comes for a cardiovascular evaluation.  He has a past medical history of Zuniga's esophagus, mixed hyperlipidemia and obstructive sleep apnea.  He has family history of coronary artery disease and aortic aneurysm.  The patient quit smoking over 30 years ago.  He reports fatigue but denies dyspnea or angina.  His EKG shows sinus rhythm with repolarization abnormalities.  He had low iron levels by lab work done in February of this year.  He had a colonoscopy with polyp resection which was negative for malignancy.  Denies       Past History:    Medical History:  Past Medical History:   Diagnosis Date    Zuniga esophagus 02/2020    Bradycardia     USUALLY RUNS 40'S-50'S; WITH ANESTHESIA HAS DROPPED IN 30'S    Colon polyp 2/2022    Colonoscopy causing post-procedural bleeding 2023    BLEEDING 1 WEEK AFTER REMOVAL OF 40MM COLON POLYP    GERD (gastroesophageal reflux disease)     3-4 years ago    Hyperlipidemia 12/22    Kidney stone     Sleep apnea        Surgical History: has a past surgical history that includes Rotator cuff repair; Elbow Arthroplasty; Colonoscopy (N/A, 02/10/2022); cystoscopy bladder stone lithotripsy; Colonoscopy (N/A, 03/07/2022); Colonoscopy (N/A, 03/13/2022); Colonoscopy (N/A, 07/11/2022); Upper gastrointestinal endoscopy; Esophagogastroduodenoscopy (N/A, 7/17/2023); Colonoscopy (N/A, 7/17/2023); Colonoscopy (N/A, 1/12/2024); and Colonoscopy (N/A, 1/24/2025).     Family History: family history includes Cancer in his father; Colon cancer (age of onset: 78) in his father; Colon polyps in his father.     Social History: reports that he has never smoked. He has never been exposed to tobacco smoke. He has never used smokeless tobacco. He reports current alcohol use of about 2.0  Patient Education     When You Have a Sore Throat    A sore throat can be painful. There are many reasons why you may have a sore throat. Your healthcare provider will work with you to find the cause of your sore throat. He or she will also find the best treatment for you.  What causes a sore throat?  Sore throats can be caused or worsened by:  · Cold or flu viruses  · Bacteria  · Irritants such as tobacco smoke or air pollution  · Acid reflux  A healthy throat  The tonsils are on the sides of the throat near the base of the tongue. They collect viruses and bacteria and help fight infection. The throat (pharynx) is the passage for air. Mucus from the nasal cavity also moves down the passage.  An inflamed throat  The tonsils and pharynx can become inflamed due to a cold or flu virus. Postnasal drip (excess mucus draining from the nasal cavity) can irritate the throat. It can also make the throat or tonsils more likely to be infected by bacteria. Severe, untreated tonsillitis in children or adults can cause a pocket of pus (abscess) to form near the tonsil.  Your evaluation  A medical evaluation can help find the cause of your sore throat. It can also help your healthcare provider choose the best treatment for you. The evaluation may include a health history, physical exam, and diagnostic tests.  Health history  Your healthcare provider may ask you the following:  · How long has the sore throat lasted and how have you been treating it?  · Do you have any other symptoms, such as body aches, fever, or cough?  · Does your sore throat recur? If so, how often? How many days of school or work have you missed because of a sore throat?  · Do you have trouble eating or swallowing?  · Have you been told that you snore or have other sleep problems?  · Do you have bad breath?  · Do you cough up bad-tasting mucus?  Physical exam  During the exam, your healthcare provider checks your ears, nose, and throat for problems. He or she  "standard drinks of alcohol per week. He reports that he does not use drugs.    Allergies: Patient has no known allergies.    Current Outpatient Medications on File Prior to Visit   Medication Sig    cetirizine (zyrTEC) 10 MG tablet Take 1 tablet by mouth Daily.    esomeprazole (nexIUM) 40 MG capsule Take 1 capsule by mouth daily.    ibuprofen (ADVIL,MOTRIN) 800 MG tablet Take 1 tablet by mouth 3 (Three) Times a Day.    melatonin 5 MG tablet tablet Take  by mouth.     No current facility-administered medications on file prior to visit.          Review of Systems : All systems were reviewed and negative except for fatigue.    Objective     /86 (BP Location: Left arm)   Pulse 71   Ht 71 cm (27.95\")   Wt 93.9 kg (207 lb)   .26 kg/m²       Physical Exam  Alert and oriented  Neck: No JVD, no bruit  Heart: Regular rate and rhythm, no rubs no murmur.  Lungs: No Rales, no wheezing.  Abdomen: Bowel sounds positive  Lower extremity: No edema.  Good distal pulses  Neurologic: No apparent motor deficits    Result Review :     The following data was reviewed by: Mookie Gonzalez MD on 05/22/2025:    CMP          1/21/2025    09:04   CMP   Glucose 95    BUN 20    Creatinine 1.09    EGFR 80.7    Sodium 140    Potassium 4.6    Chloride 108    Calcium 8.9    Total Protein 6.6    Albumin 3.9    Globulin 2.7    Total Bilirubin <0.2    Alkaline Phosphatase 75    AST (SGOT) 28    ALT (SGPT) 37    Albumin/Globulin Ratio 1.4    BUN/Creatinine Ratio 18.3    Anion Gap 8.0      CBC          1/21/2025    09:04   CBC   WBC 6.69    RBC 4.86    Hemoglobin 15.1    Hematocrit 44.6    MCV 91.8    MCH 31.1    MCHC 33.9    RDW 12.3    Platelets 288        Lipid Panel          1/21/2025    09:04   Lipid Panel   Total Cholesterol 141    Triglycerides 346    HDL Cholesterol 24    VLDL Cholesterol 54    LDL Cholesterol  63    LDL/HDL Ratio 1.99         Data reviewed: Cardiology studies    Results for orders placed in visit on " also checks for swelling in the neck, and may listen to your chest.  Possible tests  Other tests your healthcare provider may perform include:  · A throat swab to check for bacteria such as streptococcus (the bacteria that causes strep throat)  · A blood test to check for mononucleosis (a viral infection)  · A chest X-ray to rule out pneumonia, especially if you have a cough  Treating a sore throat  Treatment depends on many factors. What is the likely cause? Is the problem recent? Does it keep coming back? In many cases, the best thing to do is to treat the symptoms, rest, and let the problem heal itself. Antibiotics may help clear up some bacterial infections. For cases of severe or recurring tonsillitis, the tonsils may need to be removed.  Relieving your symptoms  · Don’t smoke, and avoid secondhand smoke.  · For children, try throat sprays or Popsicles. Adults and older children may try lozenges.  · Drink warm liquids to soothe the throat and help thin mucus. Avoid alcohol, spicy foods, and acidic drinks such as orange juice. These can irritate the throat.  · Gargle with warm saltwater (1 teaspoon of salt to 8 ounces of warm water).  · Use a humidifier to keep air moist and relieve throat dryness.  · Try over-the-counter pain relievers such as acetaminophen or ibuprofen. Use as directed, and don’t exceed the recommended dose. Don’t give aspirin to children.   Are antibiotics needed?  If your sore throat is due to a bacterial infection, antibiotics may speed healing and prevent complications. Although group A streptococcus (\"strep throat\" or GAS) is the major treatable infection for a sore throat, GAS causes only 5% to 15% of sore throats in adults who seek medical care. Most sore throats are caused by cold or flu viruses. And antibiotics don’t treat viral illness. In fact, using antibiotics when they’re not needed may produce bacteria that are harder to kill. Your healthcare provider will prescribe antibiotics  08/03/21    Adult Transthoracic Echo Complete W/ Cont if Necessary Per Protocol    Interpretation Summary  1.  Normal left ventricular systolic function.  2.  No significant valve abnormalities noted.            ECG 12 Lead    Date/Time: 5/22/2025 4:48 PM  Performed by: Mookie Bradford MD    Authorized by: Mookie Bradford MD  Comparison: not compared with previous ECG   Rhythm: sinus rhythm              Assessment and Plan        Diagnoses and all orders for this visit:    1. Iron deficiency anemia, unspecified iron deficiency anemia type (Primary)  -     Iron Profile; Future    2. Family history of abdominal aortic aneurysm (AAA)  -     Cancel: Abdominal Aortic Aneurysm Screening Medicare CAR; Future  -     CT Cardiac Calcium Score Without Dye; Future  -     US AAA Screen Limited; Future    3. Mixed hyperlipidemia  -     Lipid Panel; Future  -     Lipoprotein A (LPA); Future  -     Apolipoprotein B; Future  -     High Sensitivity CRP; Future        The patient is cardiac wise stable and reports some generalized fatigue.  This may be due to obstructive sleep apnea and previous iron deficiency anemia.  I will obtain iron profile to reevaluate.  He also have major risk factors for coronary artery disease and noticed some calcifications in his coronaries by previous CT of the chest.  I will obtain a coronary calcium score for better risk stratification for future cardiovascular events.  I will obtain a lipid panel, LP(a), ApoB levels and high sensitive CRP for better risk profile assessment.  He will be scheduled for a AAA screening due to the family history.  Will consider initiation of lipid-lowering therapy at the next office visit.  He had a normal 2D echocardiogram in 2021.  Initiate lifestyle modification and healthy diet.  Continue regular exercise.    I spent 45 minutes caring for Ash on this date of service. This time includes time spent by me in the following activities:reviewing tests, obtaining and/or  only if he or she thinks they are likely to help.  If antibiotics are prescribed  Take the medicine exactly as directed. Be sure to finish your prescription even if you’re feeling better. And be sure to ask your healthcare provider or pharmacist what side effects are common and what to do about them.  Is surgery needed?  In some cases, tonsils need to be removed. This is often done as outpatient (same-day) surgery. Your healthcare provider may advise removing the tonsils in cases of:  · Several severe bouts of tonsillitis in a year. “Severe” episodes include those that lead to missed days of school or work, or that need to be treated with antibiotics.  · Tonsillitis that causes breathing problems during sleep  · Tonsillitis caused by food particles collecting in pouches in the tonsils (cryptic tonsillitis)  Call your healthcare provider if any of the following occur:  · Symptoms worsen, or new symptoms develop.  · Swollen tonsils make breathing difficult.  · The pain is severe enough to keep you from drinking liquids.  · A skin rash, hives, or wheezing develops. Any of these could signal an allergic reaction to antibiotics.  · Symptoms don’t improve within a week.  · Symptoms don’t improve within 2 to 3 days of starting antibiotics.   Date Last Reviewed: 10/1/2016  © 4824-4424 [employer]. [address] [place] [address], [place] [address]. All rights reserved. This information is not intended as a substitute for professional medical care. Always follow your healthcare professional's instructions.           Patient Education     Viral Upper Respiratory Illness (Adult)  You have a viral upper respiratory illness (URI), which is another term for the common cold. This illness is contagious during the first few days. It is spread through the air by coughing and sneezing. It may also be spread by direct contact (touching the sick person and then touching your own eyes, nose, or mouth). Frequent handwashing will  decrease risk of spread. Most viral illnesses go away within 7 to 10 days with rest and simple home remedies. Sometimes the illness may last for several weeks. Antibiotics will not kill a virus, and they are generally not prescribed for this condition.    Home care  · If symptoms are severe, rest at home for the first 2 to 3 days. When you resume activity, don't let yourself get too tired.  · Avoid being exposed to cigarette smoke (yours or others’).  · You may use acetaminophen or ibuprofen to control pain and fever, unless another medicine was prescribed. If you have chronic liver or kidney disease, have ever had a stomach ulcer or gastrointestinal bleeding, or are taking blood-thinning medicines, talk with your healthcare provider before using these medicines. Aspirin should never be given to anyone under 18 years of age who is ill with a viral infection or fever. It may cause severe liver or brain damage.  · Your appetite may be poor, so a light diet is fine. Avoid dehydration by drinking 6 to 8 glasses of fluids per day (water, soft drinks, juices, tea, or soup). Extra fluids will help loosen secretions in the nose and lungs.  · Over-the-counter cold medicines will not shorten the length of time you’re sick, but they may be helpful for the following symptoms: cough, sore throat, and nasal and sinus congestion. (Note: Do not use decongestants if you have high blood pressure.)  Follow-up care  Follow up with your healthcare provider, or as advised.  When to seek medical advice  Call your healthcare provider right away if any of these occur:  · Cough with lots of colored sputum (mucus)  · Severe headache; face, neck, or ear pain  · Difficulty swallowing due to throat pain  · Fever of 100.4°F (38°C) or higher, or as directed by your healthcare provider  Call 911  Call 911 if any of these occur:  · Chest pain, shortness of breath, wheezing, or difficulty breathing  · Coughing up blood  · Inability to swallow due to  reviewing a separately obtained history, performing a medically appropriate examination and/or evaluation , ordering medications, tests, or procedures, and documenting information in the medical record    Follow Up     Return for After testing.    Patient was given instructions and counseling regarding his condition or for health maintenance advice. Please see specific information pulled into the AVS if appropriate.        throat pain  Date Last Reviewed: 9/13/2015  © 1382-2425 [employer]. [address] [place] [address], [place] [address]. All rights reserved. This information is not intended as a substitute for professional medical care. Always follow your healthcare professional's instructions.

## 2025-06-04 ENCOUNTER — OFFICE VISIT (OUTPATIENT)
Dept: ORTHOPEDIC SURGERY | Facility: CLINIC | Age: 55
End: 2025-06-04
Payer: COMMERCIAL

## 2025-06-04 VITALS — DIASTOLIC BLOOD PRESSURE: 78 MMHG | HEART RATE: 67 BPM | SYSTOLIC BLOOD PRESSURE: 128 MMHG | OXYGEN SATURATION: 94 %

## 2025-06-04 DIAGNOSIS — M17.12 PRIMARY OSTEOARTHRITIS OF LEFT KNEE: Primary | ICD-10-CM

## 2025-06-04 PROCEDURE — 99213 OFFICE O/P EST LOW 20 MIN: CPT | Performed by: PHYSICIAN ASSISTANT

## 2025-06-04 NOTE — PROGRESS NOTES
Chief Complaint  Follow-up of the Left Knee    Subjective          Ash Miller presents to [facility] ORTHOPED[facility]   History of Present Illness    Ash Miller presents today for a follow-up of his left knee.  Patient has left knee arthritis that we are treating conservatively.  Today, he states that his knee feels great.  He reports significant relief from his previous left knee steroid injection.  Reports good knee range of motion as well as strength.  Denies any new injuries or concerns today.      No Known Allergies     Social History     Socioeconomic History    Marital status:    Tobacco Use    Smoking status: Never     Passive exposure: Never    Smokeless tobacco: Never   Vaping Use    Vaping status: Never Used   Substance and Sexual Activity    Alcohol use: Yes     Alcohol/week: 2.0 standard drinks of alcohol     Types: 2 Cans of beer per week     Comment: Rarely     Drug use: Never    Sexual activity: Yes     Partners: Female     Birth control/protection: None, Hysterectomy        I reviewed the patient's chief complaint, history of present illness, review of systems, past medical history, surgical history, family history, social history, medications, and allergy list.     REVIEW OF SYSTEMS    Constitutional: Denies fevers, chills, weight loss  Cardiovascular: Denies chest pain, shortness of breath  Skin: Denies rashes, acute skin changes  Neurologic: Denies headache, loss of consciousness  MSK: Left knee pain      Objective   Vital Signs:   /78   Pulse 67   SpO2 94%     There is no height or weight on file to calculate BMI.    Physical Exam    General: Alert. No acute distress.   Left lower extremity: Nontender palpation of the medial or lateral joint lines.  No knee effusion.  Full knee extension.  Knee flexion 120.  Knee extensor mechanism intact.  Knee stable to varus and valgus stress.  Calf soft, nontender.  Demonstrates active ankle plantarflexion and  dorsiflexion.    Procedures    Imaging Results (Most Recent)       None                   Assessment and Plan    Diagnoses and all orders for this visit:    1. Primary osteoarthritis of left knee (Primary)        Ash Miller presents today to follow-up with his left knee arthritis that we are treating conservatively.  Patient to continue with home exercises for knee range of motion as well as strength.  Continue with activities as tolerated.  We discussed repeat left knee steroid injections in the future if needed.      Patient will follow up as needed.    Call or return if symptoms worsen or patient has any concerns.       Follow Up   Return if symptoms worsen or fail to improve.  Patient was given instructions and counseling regarding his condition or for health maintenance advice. Please see specific information pulled into the AVS if appropriate.     Elida Tena PA-C  06/04/25  15:05 EDT

## 2025-06-23 ENCOUNTER — LAB (OUTPATIENT)
Facility: HOSPITAL | Age: 55
End: 2025-06-23
Payer: COMMERCIAL

## 2025-06-23 ENCOUNTER — HOSPITAL ENCOUNTER (OUTPATIENT)
Dept: CT IMAGING | Facility: HOSPITAL | Age: 55
Discharge: HOME | End: 2025-06-23
Payer: COMMERCIAL

## 2025-06-23 ENCOUNTER — HOSPITAL ENCOUNTER (OUTPATIENT)
Dept: ULTRASOUND IMAGING | Facility: HOSPITAL | Age: 55
Discharge: HOME | End: 2025-06-23
Payer: COMMERCIAL

## 2025-06-23 DIAGNOSIS — D50.9 IRON DEFICIENCY ANEMIA, UNSPECIFIED IRON DEFICIENCY ANEMIA TYPE: ICD-10-CM

## 2025-06-23 DIAGNOSIS — E78.2 MIXED HYPERLIPIDEMIA: ICD-10-CM

## 2025-06-23 DIAGNOSIS — Z82.49 FAMILY HISTORY OF ABDOMINAL AORTIC ANEURYSM (AAA): ICD-10-CM

## 2025-06-23 LAB
CHOLEST SERPL-MCNC: 202 MG/DL (ref 0–200)
CRP SERPL-MCNC: 0.53 MG/DL (ref 0.01–0.5)
HDLC SERPL-MCNC: 42 MG/DL (ref 40–60)
IRON 24H UR-MRATE: 95 MCG/DL (ref 59–158)
IRON SATN MFR SERPL: 22 % (ref 20–50)
LDLC SERPL CALC-MCNC: 134 MG/DL (ref 0–100)
LDLC/HDLC SERPL: 3.11 {RATIO}
TIBC SERPL-MCNC: 425 MCG/DL (ref 298–536)
TRANSFERRIN SERPL-MCNC: 285 MG/DL (ref 200–360)
TRIGL SERPL-MCNC: 147 MG/DL (ref 0–150)
VLDLC SERPL-MCNC: 26 MG/DL (ref 5–40)

## 2025-06-23 PROCEDURE — 84466 ASSAY OF TRANSFERRIN: CPT

## 2025-06-23 PROCEDURE — 75571 CT HRT W/O DYE W/CA TEST: CPT

## 2025-06-23 PROCEDURE — 36415 COLL VENOUS BLD VENIPUNCTURE: CPT

## 2025-06-23 PROCEDURE — 80061 LIPID PANEL: CPT

## 2025-06-23 PROCEDURE — 86141 C-REACTIVE PROTEIN HS: CPT

## 2025-06-23 PROCEDURE — 76706 US ABDL AORTA SCREEN AAA: CPT

## 2025-06-23 PROCEDURE — 83695 ASSAY OF LIPOPROTEIN(A): CPT

## 2025-06-23 PROCEDURE — 82172 ASSAY OF APOLIPOPROTEIN: CPT

## 2025-06-23 PROCEDURE — 83540 ASSAY OF IRON: CPT

## 2025-06-24 LAB — LPA SERPL-SCNC: 30.9 NMOL/L

## 2025-06-25 ENCOUNTER — RESULTS FOLLOW-UP (OUTPATIENT)
Dept: CARDIOLOGY | Facility: CLINIC | Age: 55
End: 2025-06-25
Payer: COMMERCIAL

## 2025-06-26 LAB — APO B SERPL-MCNC: 93 MG/DL

## 2025-08-25 ENCOUNTER — OFFICE VISIT (OUTPATIENT)
Dept: ORTHOPEDIC SURGERY | Facility: CLINIC | Age: 55
End: 2025-08-25
Payer: COMMERCIAL

## 2025-08-25 VITALS
SYSTOLIC BLOOD PRESSURE: 131 MMHG | WEIGHT: 207 LBS | DIASTOLIC BLOOD PRESSURE: 71 MMHG | HEART RATE: 55 BPM | HEIGHT: 68 IN | OXYGEN SATURATION: 97 % | BODY MASS INDEX: 31.37 KG/M2

## 2025-08-25 DIAGNOSIS — M17.12 PRIMARY OSTEOARTHRITIS OF LEFT KNEE: Primary | ICD-10-CM

## 2025-08-25 PROCEDURE — 20610 DRAIN/INJ JOINT/BURSA W/O US: CPT | Performed by: STUDENT IN AN ORGANIZED HEALTH CARE EDUCATION/TRAINING PROGRAM

## 2025-08-25 PROCEDURE — 99213 OFFICE O/P EST LOW 20 MIN: CPT | Performed by: STUDENT IN AN ORGANIZED HEALTH CARE EDUCATION/TRAINING PROGRAM

## 2025-08-25 RX ORDER — LIDOCAINE HYDROCHLORIDE 10 MG/ML
5 INJECTION, SOLUTION INFILTRATION; PERINEURAL
Status: COMPLETED | OUTPATIENT
Start: 2025-08-25 | End: 2025-08-25

## 2025-08-25 RX ORDER — TRIAMCINOLONE ACETONIDE 40 MG/ML
40 INJECTION, SUSPENSION INTRA-ARTICULAR; INTRAMUSCULAR
Status: COMPLETED | OUTPATIENT
Start: 2025-08-25 | End: 2025-08-25

## 2025-08-25 RX ADMIN — TRIAMCINOLONE ACETONIDE 40 MG: 40 INJECTION, SUSPENSION INTRA-ARTICULAR; INTRAMUSCULAR at 08:56

## 2025-08-25 RX ADMIN — LIDOCAINE HYDROCHLORIDE 5 ML: 10 INJECTION, SOLUTION INFILTRATION; PERINEURAL at 08:56

## (undated) DEVICE — THE STERILE LIGHT HANDLE COVER IS USED WITH STERIS SURGICAL LIGHTING AND VISUALIZATION SYSTEMS.

## (undated) DEVICE — THE SINGLE USE ETRAP – POLYP TRAP IS USED FOR SUCTION RETRIEVAL OF ENDOSCOPICALLY REMOVED POLYPS.: Brand: ETRAP

## (undated) DEVICE — Device: Brand: DEFENDO AIR/WATER/SUCTION AND BIOPSY VALVE

## (undated) DEVICE — DISPOSABLE DISTAL ATTACHMENT: Brand: DISPOSABLE DISTAL ATTACHMENT

## (undated) DEVICE — 90MM X 18MM SINGLE-USE SLOTTED ANOSCOPE WITH BUILT-IN LIGHT SOURCE: Brand: ANOSPEC

## (undated) DEVICE — SOL IRRG H2O PL/BG 1000ML STRL

## (undated) DEVICE — Device

## (undated) DEVICE — SNAR E/S POLYP SNAREMASTER OVL/10MM 2.8X2300MM YEL

## (undated) DEVICE — SOLIDIFIER LIQLOC PLS 1500CC BT

## (undated) DEVICE — COLON KIT: Brand: MEDLINE INDUSTRIES, INC.

## (undated) DEVICE — PAD GRND E/S W/CORD SPLT A/

## (undated) DEVICE — GLV SURG SENSICARE PI LF PF 7.5 GRN STRL

## (undated) DEVICE — KT SYR GEL ORISE SNGL PK 10ML

## (undated) DEVICE — SINGLE-USE BIOPSY FORCEPS: Brand: RADIAL JAW 4

## (undated) DEVICE — PAD GRND REM POLYHESIVE A/ DISP

## (undated) DEVICE — GLV SURG SENSICARE SLT PF LF 7 STRL

## (undated) DEVICE — Device: Brand: SINGLE USE INJECTOR NM600/610